# Patient Record
Sex: FEMALE | Race: WHITE | NOT HISPANIC OR LATINO | Employment: UNEMPLOYED | URBAN - METROPOLITAN AREA
[De-identification: names, ages, dates, MRNs, and addresses within clinical notes are randomized per-mention and may not be internally consistent; named-entity substitution may affect disease eponyms.]

---

## 2017-03-29 ENCOUNTER — HOSPITAL ENCOUNTER (OUTPATIENT)
Dept: RADIOLOGY | Facility: HOSPITAL | Age: 22
Discharge: HOME | End: 2017-03-29
Attending: FAMILY MEDICINE
Payer: MEDICAID

## 2017-03-29 ENCOUNTER — TELEPHONE (OUTPATIENT)
Dept: FAMILY MEDICINE | Facility: CLINIC | Age: 22
End: 2017-03-29

## 2017-03-29 ENCOUNTER — OFFICE VISIT (OUTPATIENT)
Dept: FAMILY MEDICINE | Facility: CLINIC | Age: 22
End: 2017-03-29
Payer: MEDICAID

## 2017-03-29 VITALS
OXYGEN SATURATION: 99 % | DIASTOLIC BLOOD PRESSURE: 82 MMHG | HEIGHT: 65 IN | TEMPERATURE: 99 F | SYSTOLIC BLOOD PRESSURE: 108 MMHG | HEART RATE: 75 BPM | BODY MASS INDEX: 31.89 KG/M2 | WEIGHT: 191.38 LBS

## 2017-03-29 DIAGNOSIS — H53.40 VISUAL FIELD LOSS: ICD-10-CM

## 2017-03-29 DIAGNOSIS — R53.83 FATIGUE, UNSPECIFIED TYPE: ICD-10-CM

## 2017-03-29 DIAGNOSIS — H53.40 VISUAL FIELD LOSS: Primary | ICD-10-CM

## 2017-03-29 DIAGNOSIS — K80.20 CALCULUS OF GALLBLADDER WITHOUT CHOLECYSTITIS WITHOUT OBSTRUCTION: ICD-10-CM

## 2017-03-29 PROCEDURE — 93880 EXTRACRANIAL BILAT STUDY: CPT | Mod: 26,,, | Performed by: RADIOLOGY

## 2017-03-29 PROCEDURE — 93880 EXTRACRANIAL BILAT STUDY: CPT | Mod: TC

## 2017-03-29 PROCEDURE — 70551 MRI BRAIN STEM W/O DYE: CPT | Mod: 26,,, | Performed by: RADIOLOGY

## 2017-03-29 PROCEDURE — 99214 OFFICE O/P EST MOD 30 MIN: CPT | Mod: PBBFAC,PO | Performed by: FAMILY MEDICINE

## 2017-03-29 PROCEDURE — 99214 OFFICE O/P EST MOD 30 MIN: CPT | Mod: S$PBB,,, | Performed by: FAMILY MEDICINE

## 2017-03-29 PROCEDURE — 99999 PR PBB SHADOW E&M-EST. PATIENT-LVL IV: CPT | Mod: PBBFAC,,, | Performed by: FAMILY MEDICINE

## 2017-03-29 RX ORDER — CETIRIZINE HYDROCHLORIDE 10 MG/1
TABLET ORAL
Refills: 0 | COMMUNITY
Start: 2017-02-02 | End: 2017-03-29

## 2017-03-29 RX ORDER — AZITHROMYCIN 250 MG/1
TABLET, FILM COATED ORAL
Refills: 0 | COMMUNITY
Start: 2017-02-02 | End: 2017-03-29 | Stop reason: ALTCHOICE

## 2017-03-29 RX ORDER — MONTELUKAST SODIUM 10 MG/1
TABLET ORAL
Refills: 0 | COMMUNITY
Start: 2017-02-02 | End: 2017-03-29

## 2017-03-29 NOTE — TELEPHONE ENCOUNTER
----- Message from Alice South sent at 3/29/2017  2:53 PM CDT -----  Contact: SELF  Patient just left her appt . She is trying to have ultrasound done at [facility] right now while she is having an MRI done .     [phone]    LL

## 2017-03-29 NOTE — PROGRESS NOTES
"[facility]  Progress Note    SUBJECTIVE:     Chief Complaint   Patient presents with    Fatigue    GI Problem       HPI   Michael Gonzalez  is a 21 y.o. female here for c/o fatigue for the past couple weeks. She doesn't know why she is feeling so tired lately. Admits sleeping close to 8 hours. She says had an episode where, during dinner, she kind of "froze, and her vision went out" even though she know her eyes were open and blinking. This lasted for few minutes and went away. She was nervous, but no chest pain, SOB. There was no confusion afterwards, and returned back to her normal self.     Review of patient's allergies indicates:   Allergen Reactions    Cortisone        Past Medical History:   Diagnosis Date    Allergy     Anemia     Gallstones      History reviewed. No pertinent surgical history.  Family History   Problem Relation Age of Onset    Diabetes Maternal Grandmother     Hypertension Maternal Grandmother     Stroke Maternal Grandmother     Hypertension Maternal Grandfather     Diabetes Maternal Grandfather      Social History   Substance Use Topics    Smoking status: Current Every Day Smoker     Types: Cigarettes    Smokeless tobacco: Never Used      Comment: socially    Alcohol use Yes      Comment: occassionally        Review of Systems   Constitutional: Negative for chills, fever and malaise/fatigue.   HENT: Negative.    Eyes: Negative for blurred vision, double vision, photophobia, pain, discharge and redness.        + transient visual field loss on both eyes   Respiratory: Negative.  Negative for cough and shortness of breath.    Cardiovascular: Negative.  Negative for chest pain.   Gastrointestinal: Negative.  Negative for abdominal pain, nausea and vomiting.   Genitourinary: Negative.    Neurological: Negative for weakness and headaches.   All other systems reviewed and are negative.    OBJECTIVE:     Vitals:    03/29/17 1234   BP: 108/82   Pulse: 75   Temp: 98.5 °F (36.9 " °C)     Body mass index is 31.84 kg/(m^2).    Physical Exam   Constitutional: She is oriented to person, place, and time and well-developed, well-nourished, and in no distress. No distress.   HENT:   Head: Normocephalic and atraumatic.   Eyes: Conjunctivae and EOM are normal.   Cardiovascular: Normal rate, regular rhythm and normal heart sounds.  Exam reveals no gallop and no friction rub.    No murmur heard.  Pulmonary/Chest: Effort normal and breath sounds normal. No respiratory distress. She has no wheezes. She has no rales.   Abdominal: Soft. Bowel sounds are normal. She exhibits no distension. There is no tenderness.   Neurological: She is alert and oriented to person, place, and time.   No neurological deficits. Motor 5/5 in both upper/lower extremities. CN 2-12 grossly intact   Skin: Skin is warm. She is not diaphoretic.       Old records were reviewed. Labs and/or images were independently reviewed.    ASSESSMENT     1. Visual field loss    2. Calculus of gallbladder without cholecystitis without obstruction    3. Fatigue, unspecified type        PLAN:     Visual field loss  -     MRI Brain Without Contrast; Future; Expected date: 3/29/17  -     US Carotid Bilateral; Future; Expected date: 3/29/17  -     Will order MRI to r/o intracranial abnormalities.     Calculus of gallbladder without cholecystitis without obstruction  -     Ambulatory referral to General Surgery for elective gallstone surgery.    Fatigue, unspecified type  -     CBC auto differential; Future  -     Comprehensive metabolic panel; Future  -     TSH; Future  -     T4, free; Future  -     Hemoglobin A1c; Future  -     Will r/o NEYDA as cause of fatigue.      RTC PRMIRNA Land MD  03/29/2017 1:33 PM

## 2017-03-29 NOTE — TELEPHONE ENCOUNTER
----- Message from Alice South sent at 3/29/2017  2:53 PM CDT -----  Contact: SELF  Patient just left her appt . She is trying to have ultrasound done at [facility] right now while she is having an MRI done .     [phone]    LL

## 2017-10-16 ENCOUNTER — OFFICE VISIT (OUTPATIENT)
Dept: FAMILY MEDICINE | Facility: CLINIC | Age: 22
End: 2017-10-16
Payer: MEDICAID

## 2017-10-16 VITALS
DIASTOLIC BLOOD PRESSURE: 72 MMHG | WEIGHT: 202.38 LBS | TEMPERATURE: 98 F | HEIGHT: 65 IN | BODY MASS INDEX: 33.72 KG/M2 | HEART RATE: 86 BPM | OXYGEN SATURATION: 98 % | SYSTOLIC BLOOD PRESSURE: 104 MMHG

## 2017-10-16 DIAGNOSIS — F41.9 ANXIETY: ICD-10-CM

## 2017-10-16 DIAGNOSIS — S29.012A STRAIN OF RHOMBOID MUSCLE, INITIAL ENCOUNTER: Primary | ICD-10-CM

## 2017-10-16 DIAGNOSIS — M25.511 ACUTE PAIN OF RIGHT SHOULDER: ICD-10-CM

## 2017-10-16 PROCEDURE — 99999 PR PBB SHADOW E&M-EST. PATIENT-LVL III: CPT | Mod: PBBFAC,,, | Performed by: FAMILY MEDICINE

## 2017-10-16 PROCEDURE — 99213 OFFICE O/P EST LOW 20 MIN: CPT | Mod: PBBFAC,PO | Performed by: FAMILY MEDICINE

## 2017-10-16 PROCEDURE — 99214 OFFICE O/P EST MOD 30 MIN: CPT | Mod: S$PBB,,, | Performed by: FAMILY MEDICINE

## 2017-10-16 RX ORDER — TIZANIDINE 4 MG/1
4 TABLET ORAL EVERY 8 HOURS
Qty: 30 TABLET | Refills: 0 | Status: SHIPPED | OUTPATIENT
Start: 2017-10-16 | End: 2017-10-26

## 2017-10-16 RX ORDER — NAPROXEN 500 MG/1
TABLET ORAL
COMMUNITY
Start: 2017-10-05 | End: 2017-10-16

## 2017-10-16 RX ORDER — IBUPROFEN 200 MG
200 TABLET ORAL EVERY 6 HOURS PRN
COMMUNITY
End: 2018-01-31

## 2017-10-16 RX ORDER — BUSPIRONE HYDROCHLORIDE 5 MG/1
5 TABLET ORAL 2 TIMES DAILY
Qty: 60 TABLET | Refills: 2 | Status: SHIPPED | OUTPATIENT
Start: 2017-10-16 | End: 2018-01-31

## 2017-10-16 RX ORDER — METHOCARBAMOL 500 MG/1
TABLET, FILM COATED ORAL
COMMUNITY
Start: 2017-10-05 | End: 2017-10-16

## 2017-10-16 NOTE — PROGRESS NOTES
[facility]  Progress Note    SUBJECTIVE:     Chief Complaint   Patient presents with    Shoulder Injury    Anxiety       HPI   Michael Gonzalez  is a 22 y.o. female here for c/o right shoulder injury when trying to clean a tree in the backyard, where it almost hit her. Rates pain as moderate, but is getting better with ibuprofen. Certain movements make it worst. Still has good range of motion. Also here for severe anxiety. She has stopped paxil as she is trying to actively get pregnant. The medication worked great but when she tried to wean herself off, the anxiety came back roaring.     Review of patient's allergies indicates:  No Known Allergies    Past Medical History:   Diagnosis Date    Allergy     Gallstones      History reviewed. No pertinent surgical history.  Family History   Problem Relation Age of Onset    Diabetes Maternal Grandmother     Hypertension Maternal Grandmother     Stroke Maternal Grandmother     Hypertension Maternal Grandfather     Diabetes Maternal Grandfather      Social History   Substance Use Topics    Smoking status: Current Every Day Smoker     Types: Cigarettes    Smokeless tobacco: Never Used      Comment: socially    Alcohol use Yes      Comment: occassionally        Review of Systems   Constitutional: Negative for chills, fever and malaise/fatigue.   HENT: Negative.    Respiratory: Negative.  Negative for cough and shortness of breath.    Cardiovascular: Negative.  Negative for chest pain.   Gastrointestinal: Negative.  Negative for abdominal pain, nausea and vomiting.   Genitourinary: Negative.    Musculoskeletal: Positive for back pain and joint pain (right shoulder). Negative for falls, myalgias and neck pain.   Neurological: Negative for weakness and headaches.   Psychiatric/Behavioral: Negative for depression. The patient is nervous/anxious.    All other systems reviewed and are negative.    OBJECTIVE:     Vitals:    10/16/17 0940   BP: 104/72   Pulse: 86    Temp: 98.4 °F (36.9 °C)     Body mass index is 33.68 kg/m².    Physical Exam   Constitutional: She is oriented to person, place, and time. She appears distressed (mild).   HENT:   Head: Normocephalic and atraumatic.   Eyes: Conjunctivae and EOM are normal.   Pulmonary/Chest: Effort normal.   Musculoskeletal: She exhibits tenderness (to palpation of right rhomboid muscle. good ROM of right shoulder, no point tenderness. good strength. ).   Neurological: She is alert and oriented to person, place, and time.   Skin: She is not diaphoretic.   Psychiatric: Her mood appears anxious. She does not exhibit a depressed mood. She expresses no homicidal and no suicidal ideation. She expresses no suicidal plans and no homicidal plans.       Old records were reviewed. Labs and/or images were independently reviewed.    ASSESSMENT     1. Strain of rhomboid muscle, initial encounter    2. Acute pain of right shoulder    3. Anxiety        PLAN:     Strain of rhomboid muscle, R shoulder pain  -     Start tizanidine (ZANAFLEX) 4 MG tablet; Take 1 tablet (4 mg total) by mouth every 8 (eight) hours.  Dispense: 30 tablet; Refill: 0  -     Patient counseled on good posture and stretching exercises. Continue to place ice packs on affected areas 3-4 times daily. Take medications as prescribed. Instructed patient to call MD if symptoms persist or worsen.    Anxiety  -     Start busPIRone (BUSPAR) 5 MG Tab; Take 1 tablet (5 mg total) by mouth 2 (two) times daily.  Dispense: 60 tablet; Refill: 2  -      Titrate up as needed. Patient trying to get pregnant. Weaned off paxil, start buspar.      RTC PRN    Christos Land MD  10/16/2017 10:09 AM

## 2018-01-31 ENCOUNTER — OFFICE VISIT (OUTPATIENT)
Dept: URGENT CARE | Facility: CLINIC | Age: 23
End: 2018-01-31
Payer: MEDICAID

## 2018-01-31 ENCOUNTER — TELEPHONE (OUTPATIENT)
Dept: FAMILY MEDICINE | Facility: CLINIC | Age: 23
End: 2018-01-31

## 2018-01-31 VITALS
BODY MASS INDEX: 30.82 KG/M2 | TEMPERATURE: 98 F | HEART RATE: 99 BPM | WEIGHT: 185 LBS | SYSTOLIC BLOOD PRESSURE: 126 MMHG | HEIGHT: 65 IN | DIASTOLIC BLOOD PRESSURE: 81 MMHG | RESPIRATION RATE: 16 BRPM | OXYGEN SATURATION: 99 %

## 2018-01-31 DIAGNOSIS — H11.32 SUBCONJUNCTIVAL HEMORRHAGE OF LEFT EYE: Primary | ICD-10-CM

## 2018-01-31 PROCEDURE — 3008F BODY MASS INDEX DOCD: CPT | Mod: S$GLB,,, | Performed by: PHYSICIAN ASSISTANT

## 2018-01-31 PROCEDURE — 99214 OFFICE O/P EST MOD 30 MIN: CPT | Mod: S$GLB,,, | Performed by: PHYSICIAN ASSISTANT

## 2018-01-31 RX ORDER — PNV,CALCIUM 72/IRON/FOLIC ACID 27 MG-1 MG
TABLET ORAL
Refills: 11 | COMMUNITY
Start: 2017-12-19 | End: 2018-01-31

## 2018-01-31 NOTE — PROGRESS NOTES
"Subjective:       Patient ID: Michael Gonzalez is a 22 y.o. female.    Vitals:  height is 5' 5" (1.651 m) and weight is 83.9 kg (185 lb). Her temperature is 98 °F (36.7 °C). Her blood pressure is 126/81 and her pulse is 99. Her respiration is 16 and oxygen saturation is 99%.     Chief Complaint: Eye Problem    Eye Problem    The left eye is affected. This is a new problem. The current episode started today. The problem occurs constantly. The problem has been gradually worsening. There was no injury mechanism. The pain is at a severity of 6/10. The pain is moderate. There is no known exposure to pink eye. She does not wear contacts. Associated symptoms include eye redness, a foreign body sensation and itching. Pertinent negatives include no blurred vision, eye discharge, double vision, fever, nausea, photophobia, recent URI or vomiting. She has tried eye drops for the symptoms. The treatment provided no relief.     Review of Systems   Constitution: Negative for chills and fever.   HENT: Negative for congestion.    Eyes: Positive for itching and redness. Negative for blurred vision, discharge, double vision, pain and photophobia.   Gastrointestinal: Negative for nausea and vomiting.   Neurological: Negative for headaches.   All other systems reviewed and are negative.      Objective:      Physical Exam   Constitutional: She is oriented to person, place, and time. Vital signs are normal. She appears well-developed and well-nourished. She does not appear ill. No distress.   HENT:   Head: Normocephalic and atraumatic.   Right Ear: External ear normal.   Left Ear: External ear normal.   Nose: Nose normal.   Eyes: EOM and lids are normal. Pupils are equal, round, and reactive to light. Lids are everted and swept, no foreign bodies found. Right eye exhibits no chemosis, no discharge, no exudate and no hordeolum. No foreign body present in the right eye. Left eye exhibits no chemosis, no discharge, no exudate and no " hordeolum. No foreign body present in the left eye. Right conjunctiva is not injected. Right conjunctiva has no hemorrhage. Left conjunctiva has a hemorrhage.       Subconjunctival hemorrhage visible on left eye; no chemosis; no discharge; no visual disturbances    Neck: Normal range of motion. Neck supple.   Cardiovascular: Normal rate, regular rhythm and normal heart sounds.  Exam reveals no gallop and no friction rub.    No murmur heard.  Pulmonary/Chest: Effort normal and breath sounds normal. No respiratory distress. She has no decreased breath sounds. She has no wheezes. She has no rhonchi. She has no rales.   Musculoskeletal: Normal range of motion.   Neurological: She is alert and oriented to person, place, and time.   Skin: Skin is warm and dry. No rash noted. No erythema.   Psychiatric: She has a normal mood and affect. Her behavior is normal.   Nursing note and vitals reviewed.      Assessment:       1. Subconjunctival hemorrhage of left eye        Plan:         Subconjunctival hemorrhage of left eye      Patient Instructions     Please follow up with your primary care provider within 2-5 days if your signs and symptoms have not resolved or worsen.     If your condition worsens or fails to improve we recommend that you receive another evaluation at the emergency room immediately or contact your primary medical clinic to discuss your concerns.   You must understand that you have received an Urgent Care treatment only and that you may be released before all of your medical problems are known or treated. You, the patient, will arrange for follow up care as instructed.         Subconjunctival Hemorrhage    A subconjunctival hemorrhage is when a blood vessel breaks open in the white of the eye. It causes a bright red patch in the white of the eye. It is similar to a bruise on the skin. This type of hemorrhage is common. It can look quite alarming, but it is usually harmless.  Understanding the conjunctiva  The  conjunctiva is the thin layer that covers the inside of the eyelids and the surface of the eye. It has many tiny blood vessels that bring oxygen and nutrients to the eye. The sclera is the white part of the eye that lies beneath the conjunctiva. Sometimes a blood vessel in the conjunctiva breaks open and bleeds. The blood then collects under the conjunctiva and turns part of the eye red. Over several weeks, your body then absorbs the blood.  What causes subconjunctival hemorrhage?  In many cases the cause isnt known. But some health conditions may make it more likely. These include:  · Eye injury  · Eye surgery  · High blood pressure  · Inflammation of the conjunctiva  · Contact lens use  · Diabetes  · Arteriosclerosis  · Tumor of the conjunctiva  · Diseases that affect blood clotting  · Violent sneezing, coughing, or vomiting  · Certain medicines that can increase bleeding, such as aspirin  · Pushing hard during childbirth  · Straining during constipation  Symptoms of subconjunctival hemorrhage  The main symptom is a red patch on the eye. You may notice it after waking up in the morning. In most cases just one eye will have a hemorrhage. It usually happens once and then goes away. But some health conditions may cause repeat hemorrhages. You may feel like you have something in your eye, but this is not common. The hemorrhage shouldnt affect your eyesight or cause any pain. If you do have pain, you may have another type of problem with your eye.  Diagnosing subconjunctival hemorrhage  Your healthcare provider will ask about your health history. You may have a physical exam. This includes a basic eye exam. Your provider will make sure you dont have other causes of red eye that may need other treatment.  You will need to see an eye doctor (ophthalmologist) if you have had an eye injury. This doctor might use a special lighted microscope to look closely at your eye. This helps show the doctor if the injury hurt the  eye itself and not just its outer layer.  If this is not your first subconjunctival hemorrhage, your doctor may need to find the cause. For example, you may need blood tests to check for a blood clotting disorder.  Treatment for subconjunctival hemorrhage  In most cases you will not need treatment. The red patch will usually go away on its own in a few weeks. It will turn from red to brown and then yellow. There are no treatments to speed up this process. Your doctor may suggest you use a warm compress and artificial tears eye drops to help relieve some of the redness.  If your subconjunctival hemorrhage was caused by a health condition, that condition will be treated. For example, you may need a blood pressure medicine to treat high blood pressure.  When to call your healthcare provider  Call your healthcare provider right away if you have any of these:  · Hemorrhage that doesnt go away in 2 to 3 weeks  · Eye pain  · Loss of eyesight  · Another subconjunctival hemorrhage    Date Last Reviewed: 2/1/2017  © 3560-9109 [employer] [address] [place] [address], [place] [address]. All rights reserved. This information is not intended as a substitute for professional medical care. Always follow your healthcare professional's instructions.

## 2018-01-31 NOTE — TELEPHONE ENCOUNTER
----- Message from Jim Pierson sent at 1/31/2018 12:52 PM CST -----  Contact: Self/[phone]  Patient called stating that it feels like she has something in her eye and she does not know if a vessel popped in her eye but she sees blood in her eye. Thank you.

## 2018-01-31 NOTE — PATIENT INSTRUCTIONS
Please follow up with your primary care provider within 2-5 days if your signs and symptoms have not resolved or worsen.     If your condition worsens or fails to improve we recommend that you receive another evaluation at the emergency room immediately or contact your primary medical clinic to discuss your concerns.   You must understand that you have received an Urgent Care treatment only and that you may be released before all of your medical problems are known or treated. You, the patient, will arrange for follow up care as instructed.         Subconjunctival Hemorrhage    A subconjunctival hemorrhage is when a blood vessel breaks open in the white of the eye. It causes a bright red patch in the white of the eye. It is similar to a bruise on the skin. This type of hemorrhage is common. It can look quite alarming, but it is usually harmless.  Understanding the conjunctiva  The conjunctiva is the thin layer that covers the inside of the eyelids and the surface of the eye. It has many tiny blood vessels that bring oxygen and nutrients to the eye. The sclera is the white part of the eye that lies beneath the conjunctiva. Sometimes a blood vessel in the conjunctiva breaks open and bleeds. The blood then collects under the conjunctiva and turns part of the eye red. Over several weeks, your body then absorbs the blood.  What causes subconjunctival hemorrhage?  In many cases the cause isnt known. But some health conditions may make it more likely. These include:  · Eye injury  · Eye surgery  · High blood pressure  · Inflammation of the conjunctiva  · Contact lens use  · Diabetes  · Arteriosclerosis  · Tumor of the conjunctiva  · Diseases that affect blood clotting  · Violent sneezing, coughing, or vomiting  · Certain medicines that can increase bleeding, such as aspirin  · Pushing hard during childbirth  · Straining during constipation  Symptoms of subconjunctival hemorrhage  The main symptom is a red patch on the eye.  You may notice it after waking up in the morning. In most cases just one eye will have a hemorrhage. It usually happens once and then goes away. But some health conditions may cause repeat hemorrhages. You may feel like you have something in your eye, but this is not common. The hemorrhage shouldnt affect your eyesight or cause any pain. If you do have pain, you may have another type of problem with your eye.  Diagnosing subconjunctival hemorrhage  Your healthcare provider will ask about your health history. You may have a physical exam. This includes a basic eye exam. Your provider will make sure you dont have other causes of red eye that may need other treatment.  You will need to see an eye doctor (ophthalmologist) if you have had an eye injury. This doctor might use a special lighted microscope to look closely at your eye. This helps show the doctor if the injury hurt the eye itself and not just its outer layer.  If this is not your first subconjunctival hemorrhage, your doctor may need to find the cause. For example, you may need blood tests to check for a blood clotting disorder.  Treatment for subconjunctival hemorrhage  In most cases you will not need treatment. The red patch will usually go away on its own in a few weeks. It will turn from red to brown and then yellow. There are no treatments to speed up this process. Your doctor may suggest you use a warm compress and artificial tears eye drops to help relieve some of the redness.  If your subconjunctival hemorrhage was caused by a health condition, that condition will be treated. For example, you may need a blood pressure medicine to treat high blood pressure.  When to call your healthcare provider  Call your healthcare provider right away if you have any of these:  · Hemorrhage that doesnt go away in 2 to 3 weeks  · Eye pain  · Loss of eyesight  · Another subconjunctival hemorrhage    Date Last Reviewed: 2/1/2017  © 8792-7290 [employer] [address] [place] [address]. All rights reserved. This information is not intended as a substitute for professional medical care. Always follow your healthcare professional's instructions.

## 2018-02-28 ENCOUNTER — OFFICE VISIT (OUTPATIENT)
Dept: URGENT CARE | Facility: CLINIC | Age: 23
End: 2018-02-28
Payer: MEDICAID

## 2018-02-28 VITALS
BODY MASS INDEX: 33.45 KG/M2 | TEMPERATURE: 99 F | OXYGEN SATURATION: 99 % | DIASTOLIC BLOOD PRESSURE: 74 MMHG | HEART RATE: 89 BPM | WEIGHT: 201 LBS | SYSTOLIC BLOOD PRESSURE: 125 MMHG

## 2018-02-28 DIAGNOSIS — J01.90 ACUTE NON-RECURRENT SINUSITIS, UNSPECIFIED LOCATION: Primary | ICD-10-CM

## 2018-02-28 PROCEDURE — 99213 OFFICE O/P EST LOW 20 MIN: CPT | Mod: S$GLB,,, | Performed by: NURSE PRACTITIONER

## 2018-02-28 RX ORDER — AMOXICILLIN AND CLAVULANATE POTASSIUM 875; 125 MG/1; MG/1
1 TABLET, FILM COATED ORAL 2 TIMES DAILY
Qty: 20 TABLET | Refills: 0 | Status: SHIPPED | OUTPATIENT
Start: 2018-02-28 | End: 2018-03-10

## 2018-02-28 RX ORDER — FLUTICASONE PROPIONATE 50 MCG
2 SPRAY, SUSPENSION (ML) NASAL DAILY
Qty: 1 BOTTLE | Refills: 0 | Status: SHIPPED | OUTPATIENT
Start: 2018-02-28 | End: 2018-03-10

## 2018-02-28 NOTE — PROGRESS NOTES
Subjective:       Patient ID: Michael Gonzalez is a 22 y.o. female.    Vitals:  weight is 91.2 kg (201 lb). Her temperature is 99.3 °F (37.4 °C). Her blood pressure is 125/74 and her pulse is 89. Her oxygen saturation is 99%.     Chief Complaint: URI    URI    This is a new problem. The current episode started 1 to 4 weeks ago. The problem has been gradually worsening. There has been no fever. Associated symptoms include congestion, coughing, ear pain, headaches, sinus pain and a sore throat. Pertinent negatives include no abdominal pain, chest pain, nausea or wheezing. She has tried acetaminophen for the symptoms.     Review of Systems   Constitution: Negative for chills, fever and malaise/fatigue.   HENT: Positive for congestion, ear pain, sinus pain and sore throat. Negative for hoarse voice.    Eyes: Negative for discharge and redness.   Cardiovascular: Negative for chest pain, dyspnea on exertion and leg swelling.   Respiratory: Positive for cough. Negative for shortness of breath, sputum production and wheezing.    Musculoskeletal: Negative for myalgias.   Gastrointestinal: Negative for abdominal pain and nausea.   Neurological: Positive for headaches.       Objective:      Physical Exam   Constitutional: She is oriented to person, place, and time. Vital signs are normal. She appears well-developed and well-nourished. She is cooperative.  Non-toxic appearance. She does not have a sickly appearance. She does not appear ill. No distress.   HENT:   Head: Normocephalic and atraumatic.   Right Ear: Hearing, tympanic membrane, external ear and ear canal normal.   Left Ear: Hearing, tympanic membrane, external ear and ear canal normal.   Nose: Mucosal edema and rhinorrhea present. Right sinus exhibits maxillary sinus tenderness and frontal sinus tenderness. Left sinus exhibits maxillary sinus tenderness and frontal sinus tenderness.   Mouth/Throat: Uvula is midline and mucous membranes are normal. Posterior  oropharyngeal edema and posterior oropharyngeal erythema present.   Eyes: Conjunctivae and lids are normal.   Neck: Normal range of motion and full passive range of motion without pain. Neck supple. No neck rigidity. No edema, no erythema and normal range of motion present.   Cardiovascular: Normal rate, regular rhythm and normal heart sounds.    Pulmonary/Chest: Effort normal and breath sounds normal. No accessory muscle usage. No apnea, no tachypnea and no bradypnea. No respiratory distress. She has no decreased breath sounds. She has no wheezes. She has no rhonchi. She has no rales.   Abdominal: Normal appearance.   Lymphadenopathy:        Head (right side): No submental, no submandibular, no tonsillar, no preauricular, no posterior auricular and no occipital adenopathy present.        Head (left side): No submental, no submandibular, no tonsillar, no preauricular, no posterior auricular and no occipital adenopathy present.     She has no cervical adenopathy.   Neurological: She is alert and oriented to person, place, and time.   Psychiatric: She has a normal mood and affect. Her speech is normal and behavior is normal.   Nursing note and vitals reviewed.      Assessment:       1. Acute non-recurrent sinusitis, unspecified location        Plan:         Acute non-recurrent sinusitis, unspecified location  -     POCT Influenza A/B  -     amoxicillin-clavulanate 875-125mg (AUGMENTIN) 875-125 mg per tablet; Take 1 tablet by mouth 2 (two) times daily.  Dispense: 20 tablet; Refill: 0  -     fluticasone (FLONASE) 50 mcg/actuation nasal spray; 2 sprays (100 mcg total) by Each Nare route once daily.  Dispense: 1 Bottle; Refill: 0      Discussed  Negative results of flu test with pt and symptom therapy for fevers and congestion with tylenol, ibuprofen, and mucinex as directed.

## 2018-02-28 NOTE — PATIENT INSTRUCTIONS
Please follow up with your primary care provider if you are not feeling better in 7-10 days.    Please drink plenty of fluids.  Please get plenty of rest.    Please return here or go to the Emergency Department for any concerns or worsening of condition.    If you were prescribed antibiotics, please take them to completion.    If not allergic, please take over the counter Tylenol (Acetaminophen) as directed on bottle for control of pain and/or fever.    Please follow up with your primary care doctor or specialist as needed.    If you  smoke, please stop smoking.    Acute Bacterial Rhinosinusitis (ABRS)    Acute bacterial rhinosinusitis (ABRS) is an infection of your nasal cavity and sinuses. Its caused by bacteria. Acute means that youve had symptoms for less than 12 weeks.  Understanding your sinuses  The nasal cavity is the large air-filled space behind your nose. The sinuses are a group of spaces formed by the bones of your face. They connect with your nasal cavity. ABRS causes the tissue lining these spaces to become inflamed. Mucus may not drain normally. This leads to facial pain and other symptoms.  What causes ABRS?  ABRS most often follows an upper respiratory infection caused by a virus. Bacteria then infect the lining of your nasal cavity and sinuses. But you can also get ABRS if you have:  · Nasal allergies  · Long-term nasal swelling and congestion not caused by allergies  · Blockage in the nose  Symptoms of ABRS  The symptoms of ABRS may be different for each person, and can include:  · Nasal congestion  · Runny nose  · Fluid draining from the nose down the throat (postnasal drip)  · Headache  · Cough  · Pain in the sinuses  · Thick, colored fluid from the nose (mucus)  · Fever  Diagnosing ABRS  ABRS may be diagnosed if youve had an upper respiratory infection like a cold and cough for longer than 10 to 14 days. Your health care provider will ask about your symptoms and your medical history. The  provider will check your vital signs, including your temperature. Youll have a physical exam. The health care provider will check your ears, nose, and throat. You likely wont need any tests. If ABRS comes back, you may have a culture or other tests.  Treatment for ABRS  Treatment may include:  · Antibiotic medicine. This is for symptoms that last for at least 10 to 14 days.  · Nasal corticosteroid medicine. Drops or spray used in the nose can lessen swelling and congestion.  · Over-the-counter pain medicine. This is to lessen sinus pain and pressure.  · Nasal decongestant medicine. Spray or drops may help to lessen congestion. Do not use them for more than a few days.  · Salt wash (saline irrigation). This can help to loosen mucus.  Possible complications of ABRS  ABRS may come back or become long-term (chronic).  In rare cases, ABRS may cause complications such as:   · Inflamed tissue around the brain and spinal cord (meningitis)  · Inflamed tissue around the eyes (orbital cellulitis)  · Inflamed bones around the sinuses (osteitis)  These problems may need to be treated in a hospital with intravenous (IV) antibiotic medicine or surgery.  When to call the health care provider  Call your health care provider if you have any of the following:  · Symptoms that dont get better, or get worse  · Symptoms that dont get better after 3 to 5 days on antibiotics  · Trouble seeing  · Swelling around your eyes  · Confusion or trouble staying awake   Date Last Reviewed: 3/3/2015  © 0367-1821 [employer]. [address] [place] [address], [place] [address]. All rights reserved. This information is not intended as a substitute for professional medical care. Always follow your healthcare professional's instructions.

## 2018-03-23 DIAGNOSIS — J01.90 ACUTE NON-RECURRENT SINUSITIS, UNSPECIFIED LOCATION: ICD-10-CM

## 2018-03-26 RX ORDER — FLUTICASONE PROPIONATE 50 MCG
SPRAY, SUSPENSION (ML) NASAL
Refills: 0 | OUTPATIENT
Start: 2018-03-26

## 2018-05-29 PROBLEM — O41.8X20 SUBCHORIONIC HEMATOMA IN SECOND TRIMESTER: Status: ACTIVE | Noted: 2018-05-29

## 2018-05-29 PROBLEM — O46.8X2 SUBCHORIONIC HEMATOMA IN SECOND TRIMESTER: Status: ACTIVE | Noted: 2018-05-29

## 2018-06-19 PROBLEM — Z67.91 RH NEGATIVE STATE IN ANTEPARTUM PERIOD: Status: ACTIVE | Noted: 2018-06-19

## 2018-06-19 PROBLEM — O26.899 RH NEGATIVE STATE IN ANTEPARTUM PERIOD: Status: ACTIVE | Noted: 2018-06-19

## 2018-09-06 PROBLEM — O46.8X2 SUBCHORIONIC HEMATOMA IN SECOND TRIMESTER: Status: RESOLVED | Noted: 2018-05-29 | Resolved: 2018-09-06

## 2018-09-06 PROBLEM — O41.8X20 SUBCHORIONIC HEMATOMA IN SECOND TRIMESTER: Status: RESOLVED | Noted: 2018-05-29 | Resolved: 2018-09-06

## 2018-09-25 PROBLEM — O99.820 GBS (GROUP B STREPTOCOCCUS CARRIER), +RV CULTURE, CURRENTLY PREGNANT: Status: ACTIVE | Noted: 2018-09-25

## 2018-10-29 PROBLEM — O99.820 GBS (GROUP B STREPTOCOCCUS CARRIER), +RV CULTURE, CURRENTLY PREGNANT: Status: RESOLVED | Noted: 2018-09-25 | Resolved: 2018-10-29

## 2018-11-01 ENCOUNTER — HOSPITAL ENCOUNTER (OUTPATIENT)
Dept: RADIOLOGY | Facility: HOSPITAL | Age: 23
Discharge: HOME | End: 2018-11-01
Attending: OBSTETRICS & GYNECOLOGY
Payer: MEDICAID

## 2018-11-01 PROCEDURE — 93970 EXTREMITY STUDY: CPT | Mod: 26,,, | Performed by: RADIOLOGY

## 2018-11-01 PROCEDURE — 93970 EXTREMITY STUDY: CPT | Mod: TC

## 2018-11-19 ENCOUNTER — OFFICE VISIT (OUTPATIENT)
Dept: URGENT CARE | Facility: CLINIC | Age: 23
End: 2018-11-19
Payer: MEDICAID

## 2018-11-19 VITALS
TEMPERATURE: 99 F | WEIGHT: 200 LBS | RESPIRATION RATE: 18 BRPM | HEIGHT: 65 IN | BODY MASS INDEX: 33.32 KG/M2 | SYSTOLIC BLOOD PRESSURE: 120 MMHG | DIASTOLIC BLOOD PRESSURE: 76 MMHG | HEART RATE: 78 BPM | OXYGEN SATURATION: 98 %

## 2018-11-19 DIAGNOSIS — J06.9 VIRAL URI: Primary | ICD-10-CM

## 2018-11-19 LAB
CTP QC/QA: YES
CTP QC/QA: YES
FLUAV AG NPH QL: NEGATIVE
FLUBV AG NPH QL: NEGATIVE
S PYO RRNA THROAT QL PROBE: NEGATIVE

## 2018-11-19 PROCEDURE — 99214 OFFICE O/P EST MOD 30 MIN: CPT | Mod: S$GLB,,, | Performed by: NURSE PRACTITIONER

## 2018-11-19 PROCEDURE — 87880 STREP A ASSAY W/OPTIC: CPT | Mod: QW,S$GLB,, | Performed by: NURSE PRACTITIONER

## 2018-11-19 PROCEDURE — 87804 INFLUENZA ASSAY W/OPTIC: CPT | Mod: QW,S$GLB,, | Performed by: NURSE PRACTITIONER

## 2018-11-19 NOTE — PATIENT INSTRUCTIONS
Viral Upper Respiratory Illness (Adult)  You have a viral upper respiratory illness (URI), which is another term for the common cold. This illness is contagious during the first few days. It is spread through the air by coughing and sneezing. It may also be spread by direct contact (touching the sick person and then touching your own eyes, nose, or mouth). Frequent handwashing will decrease risk of spread. Most viral illnesses go away within 7 to 10 days with rest and simple home remedies. Sometimes the illness may last for several weeks. Antibiotics will not kill a virus, and they are generally not prescribed for this condition.    Home care  · If symptoms are severe, rest at home for the first 2 to 3 days. When you resume activity, don't let yourself get too tired.  · Avoid being exposed to cigarette smoke (yours or others).  · You may use acetaminophen or ibuprofen to control pain and fever, unless another medicine was prescribed. (Note: If you have chronic liver or kidney disease, have ever had a stomach ulcer or gastrointestinal bleeding, or are taking blood-thinning medicines, talk with your healthcare provider before using these medicines.) Aspirin should never be given to anyone under 18 years of age who is ill with a viral infection or fever. It may cause severe liver or brain damage.  · Your appetite may be poor, so a light diet is fine. Avoid dehydration by drinking 6 to 8 glasses of fluids per day (water, soft drinks, juices, tea, or soup). Extra fluids will help loosen secretions in the nose and lungs.  · Over-the-counter cold medicines will not shorten the length of time youre sick, but they may be helpful for the following symptoms: cough, sore throat, and nasal and sinus congestion. (Note: Do not use decongestants if you have high blood pressure.)  Follow-up care  Follow up with your healthcare provider, or as advised.  When to seek medical advice  Call your healthcare provider right away if any  of these occur:  · Cough with lots of colored sputum (mucus)  · Severe headache; face, neck, or ear pain  · Difficulty swallowing due to throat pain  · Fever of 100.4°F (38°C)  Call 911, or get immediate medical care  Call emergency services right away if any of these occur:  · Chest pain, shortness of breath, wheezing, or difficulty breathing  · Coughing up blood  · Inability to swallow due to throat pain  Date Last Reviewed: 9/13/2015  © 6949-4018 [employer]. [address] [place] [address]. All rights reserved. This information is not intended as a substitute for professional medical care. Always follow your healthcare professional's instructions.

## 2018-11-19 NOTE — PROGRESS NOTES
"Subjective:       Patient ID: Michael Gonzalez is a 23 y.o. female.    Vitals:  height is 5' 5" (1.651 m) and weight is 90.7 kg (200 lb). Her temperature is 98.6 °F (37 °C). Her blood pressure is 120/76 and her pulse is 78. Her respiration is 18 and oxygen saturation is 98%.     Chief Complaint: URI    Pt is breastfeeding.       URI    This is a new problem. The current episode started yesterday. The problem has been rapidly worsening. The maximum temperature recorded prior to her arrival was 101 - 101.9 F. Associated symptoms include congestion, coughing, headaches and a sore throat. Pertinent negatives include no ear pain, nausea, rash, sinus pain, vomiting or wheezing. She has tried NSAIDs for the symptoms. The treatment provided mild relief.       Constitution: Positive for fever. Negative for chills, sweating and fatigue.   HENT: Positive for congestion, sinus pressure and sore throat. Negative for ear pain, sinus pain and voice change.    Neck: Negative for painful lymph nodes.   Eyes: Negative for eye redness.   Respiratory: Positive for cough. Negative for chest tightness, sputum production, bloody sputum, COPD, shortness of breath, stridor, wheezing and asthma.    Gastrointestinal: Negative for nausea and vomiting.   Musculoskeletal: Negative for muscle ache.   Skin: Negative for rash.   Allergic/Immunologic: Negative for seasonal allergies and asthma.   Neurological: Positive for headaches.   Hematologic/Lymphatic: Negative for swollen lymph nodes.       Objective:      Physical Exam   Constitutional: She is oriented to person, place, and time. Vital signs are normal. She appears well-developed and well-nourished. She is cooperative.  Non-toxic appearance. She does not appear ill. No distress.   HENT:   Head: Normocephalic and atraumatic.   Right Ear: Hearing, tympanic membrane, external ear and ear canal normal.   Left Ear: Hearing, tympanic membrane, external ear and ear canal normal.   Nose: Mucosal " edema and rhinorrhea present. No nasal deformity. No epistaxis. Right sinus exhibits no maxillary sinus tenderness and no frontal sinus tenderness. Left sinus exhibits no maxillary sinus tenderness and no frontal sinus tenderness.   Mouth/Throat: Uvula is midline, oropharynx is clear and moist and mucous membranes are normal. No trismus in the jaw. Normal dentition. No uvula swelling. No oropharyngeal exudate, posterior oropharyngeal edema or posterior oropharyngeal erythema.   Eyes: Conjunctivae and lids are normal. No scleral icterus.   Sclera clear bilat   Neck: Trachea normal, full passive range of motion without pain and phonation normal. Neck supple.   Cardiovascular: Normal rate, regular rhythm, normal heart sounds, intact distal pulses and normal pulses.   Pulmonary/Chest: Effort normal and breath sounds normal. No stridor. No respiratory distress. She has no decreased breath sounds. She has no wheezes.   Abdominal: Soft. Normal appearance and bowel sounds are normal. She exhibits no distension. There is no tenderness.   Musculoskeletal: Normal range of motion. She exhibits no edema or deformity.   Lymphadenopathy:     She has no cervical adenopathy.   Neurological: She is alert and oriented to person, place, and time. She exhibits normal muscle tone. Coordination normal.   Skin: Skin is warm, dry and intact. She is not diaphoretic. No pallor.   Psychiatric: She has a normal mood and affect. Her speech is normal and behavior is normal. Judgment and thought content normal. Cognition and memory are normal.   Nursing note and vitals reviewed.      Results for orders placed or performed in visit on 11/19/18   POCT Influenza A/B   Result Value Ref Range    Rapid Influenza A Ag Negative Negative    Rapid Influenza B Ag Negative Negative     Acceptable Yes    POCT rapid strep A   Result Value Ref Range    Rapid Strep A Screen Negative Negative     Acceptable Yes       Assessment:        1. Viral URI        Plan:         Viral URI  -     POCT Influenza A/B  -     POCT rapid strep A      Patient Instructions     Viral Upper Respiratory Illness (Adult)  You have a viral upper respiratory illness (URI), which is another term for the common cold. This illness is contagious during the first few days. It is spread through the air by coughing and sneezing. It may also be spread by direct contact (touching the sick person and then touching your own eyes, nose, or mouth). Frequent handwashing will decrease risk of spread. Most viral illnesses go away within 7 to 10 days with rest and simple home remedies. Sometimes the illness may last for several weeks. Antibiotics will not kill a virus, and they are generally not prescribed for this condition.    Home care  · If symptoms are severe, rest at home for the first 2 to 3 days. When you resume activity, don't let yourself get too tired.  · Avoid being exposed to cigarette smoke (yours or others).  · You may use acetaminophen or ibuprofen to control pain and fever, unless another medicine was prescribed. (Note: If you have chronic liver or kidney disease, have ever had a stomach ulcer or gastrointestinal bleeding, or are taking blood-thinning medicines, talk with your healthcare provider before using these medicines.) Aspirin should never be given to anyone under 18 years of age who is ill with a viral infection or fever. It may cause severe liver or brain damage.  · Your appetite may be poor, so a light diet is fine. Avoid dehydration by drinking 6 to 8 glasses of fluids per day (water, soft drinks, juices, tea, or soup). Extra fluids will help loosen secretions in the nose and lungs.  · Over-the-counter cold medicines will not shorten the length of time youre sick, but they may be helpful for the following symptoms: cough, sore throat, and nasal and sinus congestion. (Note: Do not use decongestants if you have high blood pressure.)  Follow-up care  Follow  up with your healthcare provider, or as advised.  When to seek medical advice  Call your healthcare provider right away if any of these occur:  · Cough with lots of colored sputum (mucus)  · Severe headache; face, neck, or ear pain  · Difficulty swallowing due to throat pain  · Fever of 100.4°F (38°C)  Call 911, or get immediate medical care  Call emergency services right away if any of these occur:  · Chest pain, shortness of breath, wheezing, or difficulty breathing  · Coughing up blood  · Inability to swallow due to throat pain  Date Last Reviewed: 9/13/2015  © 3582-4092 [employer]. [address] [place] [address], [place] [address]. All rights reserved. This information is not intended as a substitute for professional medical care. Always follow your healthcare professional's instructions.

## 2018-11-26 ENCOUNTER — TELEPHONE (OUTPATIENT)
Dept: FAMILY MEDICINE | Facility: CLINIC | Age: 23
End: 2018-11-26

## 2018-11-26 DIAGNOSIS — F32.A DEPRESSION, UNSPECIFIED DEPRESSION TYPE: Primary | ICD-10-CM

## 2018-11-26 DIAGNOSIS — F41.9 ANXIETY: ICD-10-CM

## 2018-11-26 RX ORDER — IBUPROFEN 600 MG/1
TABLET ORAL
Refills: 0 | COMMUNITY
Start: 2018-10-26 | End: 2021-01-26

## 2018-11-26 RX ORDER — OXYCODONE AND ACETAMINOPHEN 5; 325 MG/1; MG/1
TABLET ORAL
Refills: 0 | COMMUNITY
Start: 2018-10-26 | End: 2019-08-30 | Stop reason: ALTCHOICE

## 2018-11-26 RX ORDER — PAROXETINE HYDROCHLORIDE 20 MG/1
20 TABLET, FILM COATED ORAL EVERY MORNING
Qty: 30 TABLET | Refills: 11 | OUTPATIENT
Start: 2018-11-26 | End: 2019-11-26

## 2018-11-26 RX ORDER — AMOXICILLIN 875 MG/1
TABLET, FILM COATED ORAL
Refills: 0 | COMMUNITY
Start: 2018-11-20 | End: 2019-08-30 | Stop reason: ALTCHOICE

## 2018-11-26 RX ORDER — PAROXETINE HYDROCHLORIDE 20 MG/1
20 TABLET, FILM COATED ORAL EVERY MORNING
Qty: 30 TABLET | Refills: 11 | Status: SHIPPED | OUTPATIENT
Start: 2018-11-26 | End: 2021-01-26

## 2018-11-26 RX ORDER — PNV,CALCIUM 72/IRON/FOLIC ACID 27 MG-1 MG
TABLET ORAL
Refills: 11 | COMMUNITY
Start: 2018-11-20 | End: 2019-08-30

## 2018-11-26 NOTE — TELEPHONE ENCOUNTER
Patient states she wanted to double check to see if you knew that she was taking paroxetine 20mg ?? And she also needs refill on it if its ok to take well breastfeeding .

## 2018-11-26 NOTE — TELEPHONE ENCOUNTER
----- Message from Renee Morrison sent at 11/26/2018  8:09 AM CST -----  Contact: self [phone]  Pt would like to know if okay to start taking her anxiety medication while she's breastfeeding

## 2018-11-26 NOTE — TELEPHONE ENCOUNTER
Spoke with patient and informed her that provider said that it was ok to be on her Paxil. Refill request placed.

## 2019-04-16 ENCOUNTER — HOSPITAL ENCOUNTER (EMERGENCY)
Facility: HOSPITAL | Age: 24
Discharge: HOME | End: 2019-04-16
Attending: INTERNAL MEDICINE
Payer: MEDICAID

## 2019-04-16 VITALS
WEIGHT: 200 LBS | TEMPERATURE: 98 F | DIASTOLIC BLOOD PRESSURE: 84 MMHG | HEIGHT: 65 IN | RESPIRATION RATE: 20 BRPM | HEART RATE: 76 BPM | OXYGEN SATURATION: 100 % | SYSTOLIC BLOOD PRESSURE: 112 MMHG | BODY MASS INDEX: 33.32 KG/M2

## 2019-04-16 DIAGNOSIS — R07.89 CHEST DISCOMFORT: ICD-10-CM

## 2019-04-16 DIAGNOSIS — F41.9 ANXIETY: Primary | ICD-10-CM

## 2019-04-16 LAB
ALBUMIN SERPL-MCNC: 4.3 G/DL
ALP SERPL-CCNC: 73 U/L
B-HCG UR QL: NEGATIVE
BILIRUB SERPL-MCNC: 0.6 MG/DL
BUN SERPL-MCNC: 13 MG/DL
CALCIUM SERPL-MCNC: 10.1 MG/DL
CHLORIDE SERPL-SCNC: 103 MMOL/L
CREAT SERPL-MCNC: 0.7 MG/DL
CTP QC/QA: YES
GLUCOSE SERPL-MCNC: 83 MG/DL (ref 70–110)
POC ALT (SGPT): 20 U/L
POC AST (SGOT): 23 U/L
POC CARDIAC TROPONIN I: 0 NG/ML
POC D-DI: <100 NG/ML (ref 0–450)
POC TCO2: 25 MMOL/L
POTASSIUM BLD-SCNC: 3.5 MMOL/L
PROTEIN, POC: 7.9 G/DL
SAMPLE: NORMAL
SODIUM BLD-SCNC: 146 MMOL/L

## 2019-04-16 PROCEDURE — 93005 ELECTROCARDIOGRAM TRACING: CPT | Mod: ER

## 2019-04-16 PROCEDURE — 82553 CREATINE MB FRACTION: CPT | Mod: ER

## 2019-04-16 PROCEDURE — 93010 ELECTROCARDIOGRAM REPORT: CPT | Mod: ,,, | Performed by: INTERNAL MEDICINE

## 2019-04-16 PROCEDURE — 85025 COMPLETE CBC W/AUTO DIFF WBC: CPT | Mod: ER

## 2019-04-16 PROCEDURE — 81025 URINE PREGNANCY TEST: CPT | Mod: ER | Performed by: INTERNAL MEDICINE

## 2019-04-16 PROCEDURE — 85379 FIBRIN DEGRADATION QUANT: CPT | Mod: ER

## 2019-04-16 PROCEDURE — 84484 ASSAY OF TROPONIN QUANT: CPT | Mod: ER

## 2019-04-16 PROCEDURE — 99284 EMERGENCY DEPT VISIT MOD MDM: CPT | Mod: 25,ER

## 2019-04-16 PROCEDURE — 93010 EKG 12-LEAD: ICD-10-PCS | Mod: ,,, | Performed by: INTERNAL MEDICINE

## 2019-04-17 NOTE — ED PROVIDER NOTES
"Encounter Date: 4/16/2019    SCRIBE #1 NOTE: I, Kathi Porter, am scribing for, and in the presence of,  Dr. Moreno. I have scribed the following portions of the note - Other sections scribed: HPI, ROS, PE.       History     Chief Complaint   Patient presents with    Shortness of Breath     started this morning and began feeling numbness and tingling in all four extremeties around noon and then in face just prior to arrival     Michael Gonzalez is a 23 y.o. female who presents to the ED complaining of SOB, onset this morning when she woke up feeling like she had a "ton of bricks" on her chest. She reports feeling like she couldn't catch her breath, tingling in her face and extremities, and intermittent lightheadedness without syncope. Pt does not feel lightheaded during exam. Denies CP, fever, chills, n/v. Family Hx of DM, but no known family cardiac history.  Pt stopped taking anxiety medication during pregnancy and found that she was fine without it, so she did not resume medication after pregnancy. She was re-prescribed Paxil a few weeks ago and started taking it today, approx. 1 hr PTA.        Review of patient's allergies indicates:  No Known Allergies  Past Medical History:   Diagnosis Date    Allergy     Anxiety     Gallstones      History reviewed. No pertinent surgical history.  Family History   Problem Relation Age of Onset    No Known Problems Mother     Diabetes Father     Hypertension Father      Social History     Tobacco Use    Smoking status: Former Smoker     Types: Cigarettes    Smokeless tobacco: Never Used    Tobacco comment: socially   Substance Use Topics    Alcohol use: No     Frequency: Never    Drug use: No     Review of Systems   Constitutional: Negative for chills and fever.   HENT: Negative for sore throat.    Respiratory: Positive for shortness of breath.    Cardiovascular: Negative for chest pain.   Gastrointestinal: Negative for nausea and vomiting.   Genitourinary: Negative for " "dysuria.   Musculoskeletal: Negative for back pain.   Skin: Negative for rash.   Neurological: Positive for light-headedness and numbness ("tingling"). Negative for syncope and weakness.   Hematological: Negative for adenopathy.   Psychiatric/Behavioral: Negative for behavioral problems.   All other systems reviewed and are negative.      Physical Exam     Initial Vitals [04/16/19 1933]   BP Pulse Resp Temp SpO2   (!) 142/89 87 20 97.7 °F (36.5 °C) 100 %      MAP       --         Physical Exam    Nursing note and vitals reviewed.  Constitutional: She appears well-developed and well-nourished.   HENT:   Head: Normocephalic and atraumatic.   Right Ear: External ear normal.   Left Ear: External ear normal.   Nose: Nose normal.   Eyes: Conjunctivae are normal.   Neck: Normal range of motion. Neck supple.   Cardiovascular: Normal rate and intact distal pulses.   Pulmonary/Chest: Effort normal. No respiratory distress.   Abdominal: Soft. There is no tenderness.   Musculoskeletal: Normal range of motion.   Neurological: She is alert and oriented to person, place, and time.   Skin: Skin is warm and dry. Capillary refill takes less than 2 seconds.   Psychiatric: Her behavior is normal. Her mood appears anxious.         ED Course   Procedures  Labs Reviewed   POCT D DIMER - Abnormal; Notable for the following components:       Result Value    POC D-DI <100 (*)     All other components within normal limits   TROPONIN ISTAT   POCT URINE PREGNANCY   POCT CBC   POCT CMP   POCT D DIMER   POCT TROPONIN   POCT CMP     EKG Readings: (Independently Interpreted)   Initial Reading: No STEMI. Rhythm: Normal Sinus Rhythm. Heart Rate: 74. Ectopy: No Ectopy. Conduction: Normal. ST Segments: Normal ST Segments. T Waves: Normal. Clinical Impression: Normal Sinus Rhythm       Imaging Results    None          Medical Decision Making:   History:   Old Medical Records: I decided to obtain old medical records.  Initial Assessment:   Michael CHADWICK" "Carlos is a 23 y.o. female who presents to the ED complaining of SOB, onset this morning when she woke up feeling like she had a "ton of bricks" on her chest. She reports feeling like she couldn't catch her breath, tingling in her face and extremities, and intermittent lightheadedness without syncope. Pt does not feel lightheaded during exam. Denies CP, fever, chills, n/v. Family Hx of DM, but no known family cardiac history.  Pt stopped taking anxiety medication during pregnancy and found that she was fine without it, so she did not resume medication after pregnancy. She was re-prescribed Paxil a few weeks ago and started taking it today, approx. 1 hr PTA.  Differential Diagnosis:   Anxiety  Pulmonary embolus  Pneumonia  Myocardial infarction  Pleurisy  Musculoskeletal chest pain  GERD    Independently Interpreted Test(s):   I have ordered and independently interpreted EKG Reading(s) - see prior notes  Clinical Tests:   Lab Tests: Ordered and Reviewed  Medical Tests: Ordered and Reviewed  ED Management:  CBC, CMP, troponin and D-dimer were within normal limits.  Patient was given instructions for anxiety and advised to continue her paroxetine and follow-up with her primary care physician tomorrow for re-evaluation/return to the emergency department if condition worsens.            Scribe Attestation:   Scribe #1: I performed the above scribed service and the documentation accurately describes the services I performed. I attest to the accuracy of the note.    This document was produced by a scribe under my direction and in my presence. I agree with the content of the note and have made any necessary edits.     Dr. Moreno    04/16/2019 10:26 PM             Clinical Impression:     1. Anxiety    2. Chest discomfort            Disposition:   Disposition: Discharged  Condition: Stable                        Jean-Claude Moreno MD  04/16/19 2227       Jean-Claude Moreno MD  04/17/19 0704    "

## 2019-04-17 NOTE — ED TRIAGE NOTES
Patient arrived from home via pov with c/o waking up short of breath and then began feeling numbness in feet and hands around noon. Pt states she began feeling numbness and tingling in face just prior to arrival. Pt denies any N/V/D and states she has a history of anxiety and restarted her meds today

## 2019-08-16 ENCOUNTER — PATIENT OUTREACH (OUTPATIENT)
Dept: ADMINISTRATIVE | Facility: HOSPITAL | Age: 24
End: 2019-08-16

## 2019-08-16 DIAGNOSIS — Z13.220 SCREENING FOR HYPERLIPIDEMIA: Primary | ICD-10-CM

## 2019-08-30 ENCOUNTER — OFFICE VISIT (OUTPATIENT)
Dept: FAMILY MEDICINE | Facility: CLINIC | Age: 24
End: 2019-08-30
Payer: MEDICAID

## 2019-08-30 ENCOUNTER — LAB VISIT (OUTPATIENT)
Dept: LAB | Facility: HOSPITAL | Age: 24
End: 2019-08-30
Attending: FAMILY MEDICINE
Payer: MEDICAID

## 2019-08-30 VITALS
SYSTOLIC BLOOD PRESSURE: 118 MMHG | DIASTOLIC BLOOD PRESSURE: 74 MMHG | WEIGHT: 212.63 LBS | BODY MASS INDEX: 35.43 KG/M2 | OXYGEN SATURATION: 98 % | HEIGHT: 65 IN | HEART RATE: 84 BPM | TEMPERATURE: 99 F

## 2019-08-30 DIAGNOSIS — G43.809 OTHER MIGRAINE WITHOUT STATUS MIGRAINOSUS, NOT INTRACTABLE: ICD-10-CM

## 2019-08-30 DIAGNOSIS — L98.9 SKIN LESION: Primary | ICD-10-CM

## 2019-08-30 PROBLEM — Z67.91 RH NEGATIVE STATE IN ANTEPARTUM PERIOD: Status: RESOLVED | Noted: 2018-06-19 | Resolved: 2019-08-30

## 2019-08-30 PROBLEM — R07.89 CHEST DISCOMFORT: Status: RESOLVED | Noted: 2019-04-16 | Resolved: 2019-08-30

## 2019-08-30 PROBLEM — O26.899 RH NEGATIVE STATE IN ANTEPARTUM PERIOD: Status: RESOLVED | Noted: 2018-06-19 | Resolved: 2019-08-30

## 2019-08-30 PROBLEM — F41.9 ANXIETY: Chronic | Status: ACTIVE | Noted: 2019-04-16

## 2019-08-30 LAB
ALBUMIN SERPL BCP-MCNC: 3.8 G/DL (ref 3.5–5.2)
ALP SERPL-CCNC: 80 U/L (ref 55–135)
ALT SERPL W/O P-5'-P-CCNC: 12 U/L (ref 10–44)
ANION GAP SERPL CALC-SCNC: 9 MMOL/L (ref 8–16)
AST SERPL-CCNC: 15 U/L (ref 10–40)
BASOPHILS # BLD AUTO: 0.05 K/UL (ref 0–0.2)
BASOPHILS NFR BLD: 0.6 % (ref 0–1.9)
BILIRUB SERPL-MCNC: 0.3 MG/DL (ref 0.1–1)
BUN SERPL-MCNC: 14 MG/DL (ref 6–20)
CALCIUM SERPL-MCNC: 9.8 MG/DL (ref 8.7–10.5)
CHLORIDE SERPL-SCNC: 108 MMOL/L (ref 95–110)
CHOLEST SERPL-MCNC: 138 MG/DL (ref 120–199)
CHOLEST/HDLC SERPL: 2 {RATIO} (ref 2–5)
CO2 SERPL-SCNC: 23 MMOL/L (ref 23–29)
CREAT SERPL-MCNC: 0.7 MG/DL (ref 0.5–1.4)
DIFFERENTIAL METHOD: NORMAL
EOSINOPHIL # BLD AUTO: 0.5 K/UL (ref 0–0.5)
EOSINOPHIL NFR BLD: 5.5 % (ref 0–8)
ERYTHROCYTE [DISTWIDTH] IN BLOOD BY AUTOMATED COUNT: 14 % (ref 11.5–14.5)
EST. GFR  (AFRICAN AMERICAN): >60 ML/MIN/1.73 M^2
EST. GFR  (NON AFRICAN AMERICAN): >60 ML/MIN/1.73 M^2
ESTIMATED AVG GLUCOSE: 91 MG/DL (ref 68–131)
GLUCOSE SERPL-MCNC: 89 MG/DL (ref 70–110)
HBA1C MFR BLD HPLC: 4.8 % (ref 4–5.6)
HCT VFR BLD AUTO: 38.5 % (ref 37–48.5)
HDLC SERPL-MCNC: 68 MG/DL (ref 40–75)
HDLC SERPL: 49.3 % (ref 20–50)
HGB BLD-MCNC: 12.4 G/DL (ref 12–16)
IMM GRANULOCYTES # BLD AUTO: 0.02 K/UL (ref 0–0.04)
IMM GRANULOCYTES NFR BLD AUTO: 0.2 % (ref 0–0.5)
LDLC SERPL CALC-MCNC: 61.8 MG/DL (ref 63–159)
LYMPHOCYTES # BLD AUTO: 1.7 K/UL (ref 1–4.8)
LYMPHOCYTES NFR BLD: 20.4 % (ref 18–48)
MCH RBC QN AUTO: 27.9 PG (ref 27–31)
MCHC RBC AUTO-ENTMCNC: 32.2 G/DL (ref 32–36)
MCV RBC AUTO: 87 FL (ref 82–98)
MONOCYTES # BLD AUTO: 0.5 K/UL (ref 0.3–1)
MONOCYTES NFR BLD: 6.1 % (ref 4–15)
NEUTROPHILS # BLD AUTO: 5.7 K/UL (ref 1.8–7.7)
NEUTROPHILS NFR BLD: 67.2 % (ref 38–73)
NONHDLC SERPL-MCNC: 70 MG/DL
NRBC BLD-RTO: 0 /100 WBC
PLATELET # BLD AUTO: 308 K/UL (ref 150–350)
PMV BLD AUTO: 9.7 FL (ref 9.2–12.9)
POTASSIUM SERPL-SCNC: 4.6 MMOL/L (ref 3.5–5.1)
PROT SERPL-MCNC: 7.4 G/DL (ref 6–8.4)
RBC # BLD AUTO: 4.44 M/UL (ref 4–5.4)
SODIUM SERPL-SCNC: 140 MMOL/L (ref 136–145)
T4 FREE SERPL-MCNC: 0.96 NG/DL (ref 0.71–1.51)
TRIGL SERPL-MCNC: 41 MG/DL (ref 30–150)
TSH SERPL DL<=0.005 MIU/L-ACNC: 2.32 UIU/ML (ref 0.4–4)
WBC # BLD AUTO: 8.5 K/UL (ref 3.9–12.7)

## 2019-08-30 PROCEDURE — 85025 COMPLETE CBC W/AUTO DIFF WBC: CPT

## 2019-08-30 PROCEDURE — 80061 LIPID PANEL: CPT

## 2019-08-30 PROCEDURE — 84439 ASSAY OF FREE THYROXINE: CPT

## 2019-08-30 PROCEDURE — 99999 PR PBB SHADOW E&M-EST. PATIENT-LVL III: CPT | Mod: PBBFAC,,, | Performed by: FAMILY MEDICINE

## 2019-08-30 PROCEDURE — 80053 COMPREHEN METABOLIC PANEL: CPT

## 2019-08-30 PROCEDURE — 84443 ASSAY THYROID STIM HORMONE: CPT

## 2019-08-30 PROCEDURE — 99214 OFFICE O/P EST MOD 30 MIN: CPT | Mod: S$PBB,,, | Performed by: FAMILY MEDICINE

## 2019-08-30 PROCEDURE — 83036 HEMOGLOBIN GLYCOSYLATED A1C: CPT

## 2019-08-30 PROCEDURE — 99214 PR OFFICE/OUTPT VISIT, EST, LEVL IV, 30-39 MIN: ICD-10-PCS | Mod: S$PBB,,, | Performed by: FAMILY MEDICINE

## 2019-08-30 PROCEDURE — 36415 COLL VENOUS BLD VENIPUNCTURE: CPT | Mod: PO

## 2019-08-30 PROCEDURE — 99999 PR PBB SHADOW E&M-EST. PATIENT-LVL III: ICD-10-PCS | Mod: PBBFAC,,, | Performed by: FAMILY MEDICINE

## 2019-08-30 PROCEDURE — 99213 OFFICE O/P EST LOW 20 MIN: CPT | Mod: PBBFAC,PO | Performed by: FAMILY MEDICINE

## 2019-08-30 RX ORDER — PROPRANOLOL HYDROCHLORIDE 20 MG/1
20 TABLET ORAL 2 TIMES DAILY
Qty: 60 TABLET | Refills: 3 | Status: SHIPPED | OUTPATIENT
Start: 2019-08-30 | End: 2019-09-17 | Stop reason: SDUPTHER

## 2019-08-30 NOTE — PROGRESS NOTES
[facility]  Progress Note    SUBJECTIVE:     Chief Complaint   Patient presents with    Headache     Bump right back side of head     Back Pain     Bump on back        HPI   Michael Gonzalez  is a 23 y.o. female here for 2 problems. Headaches has been going on for months. It is usually unilateral, throbbing and associated with photo/phonophobia. Takes ibuprofen which helps at times. Onset is sudden. Gets about 6 hours of sleep at night. Also has a skin growth on her back which she is concerned about. Leaning on it hurts. Occasionally, it does bleed. Patient has no other new complaints/problems at this time.      Review of patient's allergies indicates:  No Known Allergies    Past Medical History:   Diagnosis Date    Allergy     Anxiety     Gallstones      History reviewed. No pertinent surgical history.  Family History   Problem Relation Age of Onset    No Known Problems Mother     Diabetes Father     Hypertension Father      Social History     Tobacco Use    Smoking status: Former Smoker     Types: Cigarettes    Smokeless tobacco: Never Used    Tobacco comment: socially   Substance Use Topics    Alcohol use: No     Frequency: Never    Drug use: No        Review of Systems   Constitutional: Negative for chills, fever and malaise/fatigue.   HENT: Negative.    Respiratory: Negative.  Negative for cough and shortness of breath.    Cardiovascular: Negative.  Negative for chest pain.   Gastrointestinal: Negative.  Negative for abdominal pain, nausea and vomiting.   Genitourinary: Negative.    Skin:        + skin lesion on back   Neurological: Positive for headaches. Negative for sensory change, speech change, loss of consciousness and weakness.   All other systems reviewed and are negative.    OBJECTIVE:     Vitals:    08/30/19 0825   BP: 118/74   Pulse: 84   Temp: 98.5 °F (36.9 °C)     Body mass index is 35.38 kg/m².    Physical Exam   Constitutional: She is oriented to person, place, and time and  well-developed, well-nourished, and in no distress. No distress.   HENT:   Head: Normocephalic and atraumatic.   Right Ear: Tympanic membrane is not perforated, not erythematous and not bulging. No hemotympanum.   Left Ear: Tympanic membrane is not perforated, not erythematous and not bulging. No hemotympanum.   Mouth/Throat: Oropharynx is clear and moist. No oropharyngeal exudate.   Eyes: Pupils are equal, round, and reactive to light. Conjunctivae and EOM are normal.   Neck: No thyromegaly present.   Cardiovascular: Normal rate, regular rhythm and normal heart sounds. Exam reveals no gallop and no friction rub.   No murmur heard.  Pulmonary/Chest: Effort normal and breath sounds normal. No respiratory distress. She has no wheezes. She has no rales.   Abdominal: Soft. Bowel sounds are normal. She exhibits no distension. There is no tenderness. There is no rebound and no guarding.   Musculoskeletal: Normal range of motion. She exhibits no edema or tenderness.   Lymphadenopathy:     She has no cervical adenopathy.   Neurological: She is alert and oriented to person, place, and time. No cranial nerve deficit (no neurological deficits.).   Skin: Skin is warm. No rash noted. She is not diaphoretic. No erythema.   + pedunculated hemangioma of upper back       Old records were reviewed. Labs and/or images were independently reviewed.    ASSESSMENT     1. Skin lesion    2. Other migraine without status migrainosus, not intractable        PLAN:     Skin lesion  -     Ambulatory referral to Dermatology for further evaluation and treatment.    Other migraine without status migrainosus, not intractable  -     Start propranolol (INDERAL) 20 MG tablet; Take 1 tablet (20 mg total) by mouth 2 (two) times daily.  Dispense: 60 tablet; Refill: 3  -     CBC auto differential; Future  -     Comprehensive metabolic panel; Future  -     Hemoglobin A1c; Future  -     Lipid panel; Future  -     TSH; Future  -     T4, free; Future  -      Will start migraine prophylaxis therapy, goal is to decrease migraines by 50%. Currently she is getting about 6-7 migraines a week. Start with propranolol BID for now, and titrate up as needed.      RTC PRN    Christos Land MD  08/30/2019 8:55 AM

## 2019-08-31 NOTE — PROGRESS NOTES
Labs normal  TSH, free T4 normal  CMP normal  CBC normal  Lipid profile good  A1c normal/negative  Results to pt.

## 2019-09-17 ENCOUNTER — TELEPHONE (OUTPATIENT)
Dept: FAMILY MEDICINE | Facility: CLINIC | Age: 24
End: 2019-09-17

## 2019-09-17 DIAGNOSIS — G43.809 OTHER MIGRAINE WITHOUT STATUS MIGRAINOSUS, NOT INTRACTABLE: ICD-10-CM

## 2019-09-17 RX ORDER — PROPRANOLOL HYDROCHLORIDE 20 MG/1
20 TABLET ORAL 2 TIMES DAILY
Qty: 60 TABLET | Refills: 3 | Status: SHIPPED | OUTPATIENT
Start: 2019-09-17 | End: 2021-01-26

## 2019-09-17 NOTE — TELEPHONE ENCOUNTER
----- Message from Sindhu Tillman sent at 9/17/2019  9:34 AM CDT -----  Contact: self  Type: Patient Call Back    Who called:self    What is the request in detail: pt says propranolol (INDERAL) 20 MG tab is not covered by her insurance and was wondering if Dr can make it medically necessary so she can get it.    Can the clinic reply by [facility]?no    Would the patient rather a call back or a response via [facility]? call    Best call back number:

## 2019-09-17 NOTE — TELEPHONE ENCOUNTER
Sent rx that says medically necessary      Spoke with the pt and inform her that the prescription has been sent off to the pharmacy, as medically necessary.  Patient verbalized understandings.

## 2019-10-18 ENCOUNTER — OFFICE VISIT (OUTPATIENT)
Dept: URGENT CARE | Facility: CLINIC | Age: 24
End: 2019-10-18
Payer: MEDICAID

## 2019-10-18 VITALS
OXYGEN SATURATION: 99 % | RESPIRATION RATE: 18 BRPM | BODY MASS INDEX: 35.49 KG/M2 | HEIGHT: 65 IN | HEART RATE: 63 BPM | TEMPERATURE: 99 F | DIASTOLIC BLOOD PRESSURE: 69 MMHG | SYSTOLIC BLOOD PRESSURE: 122 MMHG | WEIGHT: 213 LBS

## 2019-10-18 DIAGNOSIS — J40 BRONCHITIS: Primary | ICD-10-CM

## 2019-10-18 PROCEDURE — 99213 PR OFFICE/OUTPT VISIT, EST, LEVL III, 20-29 MIN: ICD-10-PCS | Mod: 25,S$GLB,, | Performed by: NURSE PRACTITIONER

## 2019-10-18 PROCEDURE — 99213 OFFICE O/P EST LOW 20 MIN: CPT | Mod: 25,S$GLB,, | Performed by: NURSE PRACTITIONER

## 2019-10-18 RX ORDER — BETAMETHASONE SODIUM PHOSPHATE AND BETAMETHASONE ACETATE 3; 3 MG/ML; MG/ML
6 INJECTION, SUSPENSION INTRA-ARTICULAR; INTRALESIONAL; INTRAMUSCULAR; SOFT TISSUE
Status: COMPLETED | OUTPATIENT
Start: 2019-10-18 | End: 2019-10-18

## 2019-10-18 RX ORDER — PREDNISONE 20 MG/1
20 TABLET ORAL DAILY
Qty: 5 TABLET | Refills: 0 | Status: SHIPPED | OUTPATIENT
Start: 2019-10-18 | End: 2019-10-23

## 2019-10-18 RX ADMIN — BETAMETHASONE SODIUM PHOSPHATE AND BETAMETHASONE ACETATE 6 MG: 3; 3 INJECTION, SUSPENSION INTRA-ARTICULAR; INTRALESIONAL; INTRAMUSCULAR; SOFT TISSUE at 12:10

## 2019-10-18 NOTE — PROGRESS NOTES
"Subjective:       Patient ID: Michael Gonzalez is a 24 y.o. female.    Vitals:  height is 5' 5" (1.651 m) and weight is 96.6 kg (213 lb). Her temperature is 98.9 °F (37.2 °C). Her blood pressure is 122/69 and her pulse is 63. Her respiration is 18 and oxygen saturation is 99%.     Chief Complaint: URI    Ambulatory with c/o chest congestion with tightness. Worse at night. Better during day. Symptoms have been consistent over the last three weeks without relief.     URI    This is a new problem. Episode onset: 3 weeks  The problem has been gradually worsening. There has been no fever. Associated symptoms include congestion, coughing and wheezing. Pertinent negatives include no ear pain, nausea, rash, sinus pain, sore throat or vomiting. She has tried decongestant for the symptoms. The treatment provided no relief.       Constitution: Negative. Negative for chills, sweating, fatigue and fever.   HENT: Positive for congestion and postnasal drip. Negative for ear pain, sinus pain, sinus pressure, sore throat and voice change.    Neck: negative. Negative for painful lymph nodes.   Cardiovascular: Negative.    Eyes: Negative.  Negative for eye redness.   Respiratory: Positive for cough, sputum production and wheezing. Negative for chest tightness, bloody sputum, COPD, shortness of breath, stridor and asthma.    Gastrointestinal: Negative.  Negative for nausea and vomiting.   Endocrine: negative.   Genitourinary: Negative.    Musculoskeletal: Negative.  Negative for muscle ache.   Skin: Negative.  Negative for rash.   Allergic/Immunologic: Negative.  Negative for seasonal allergies and asthma.   Neurological: Negative.    Hematologic/Lymphatic: Negative.  Negative for swollen lymph nodes.   Psychiatric/Behavioral: Negative.        Objective:      Physical Exam   Constitutional: She appears well-developed and well-nourished.   HENT:   Head: Normocephalic and atraumatic.   Right Ear: External ear normal.   Left Ear: External " ear normal.   Nose: Nose normal.   Mouth/Throat: Oropharynx is clear and moist.   Eyes: Pupils are equal, round, and reactive to light. Conjunctivae and EOM are normal.   Neck: Normal range of motion. Neck supple.   Cardiovascular: Normal rate, regular rhythm, normal heart sounds and intact distal pulses.   Pulmonary/Chest: Effort normal and breath sounds normal.   Coarse bronchialvesicular bs. Clear with cough   Abdominal: Soft. Bowel sounds are normal.   Musculoskeletal: Normal range of motion.   Neurological: She is alert. No cranial nerve deficit.   Skin: Skin is warm and dry.         Assessment:       1. Bronchitis        Plan:         Bronchitis  -     betamethasone acetate-betamethasone sodium phosphate injection 6 mg      Patient Instructions       What Is Acute Bronchitis?  Acute bronchitis is when the airways in your lungs (bronchial tubes) become red and swollen (inflamed). It is usually caused by a viral infection. But it can also occur because of a bacteria or allergen. Symptoms include a cough that produces yellow or greenish mucus and can last for days or sometimes weeks.  Inside healthy lungs    Air travels in and out of the lungs through the airways. The linings of these airways produce sticky mucus. This mucus traps particles that enter the lungs. Tiny structures called cilia then sweep the particles out of the airways.     Healthy airway: Airways are normally open. Air moves in and out easily.      Healthy cilia: Tiny, hairlike cilia sweep mucus and particles up and out of the airways.   Lungs with bronchitis  Bronchitis often occurs with a cold or the flu virus. The airways become inflamed (red and swollen). There is a deep hacking cough from the extra mucus. Other symptoms may include:  · Wheezing  · Chest discomfort  · Shortness of breath  · Mild fever  A second infection, this time due to bacteria, may then occur. And airways irritated by allergens or smoke are more likely to get  infected.        Inflamed airway: Inflammation and extra mucus narrow the airway, causing shortness of breath.      Impaired cilia: Extra mucus impairs cilia, causing congestion and wheezing. Smoking makes the problem worse.   Making a diagnosis  A physical exam, health history, and certain tests help your healthcare provider make the diagnosis.  Health history  Your healthcare provider will ask you about your symptoms.  The exam  Your provider listens to your chest for signs of congestion. He or she may also check your ears, nose, and throat.  Possible tests  · A sputum test for bacteria. This requires a sample of mucus from your lungs.  · A nasal or throat swab. This tests to see if you have a bacterial infection.  · A chest X-ray. This is done if your healthcare provider thinks you have pneumonia.  · Tests to check for an underlying condition. Other tests may be done to check for things such as allergies, asthma, or COPD (chronic obstructive pulmonary disease). You may need to see a specialist for more lung function testing.  Treating a cough  The main treatment for bronchitis is easing symptoms. Avoiding smoke, allergens, and other things that trigger coughing can often help. If the infection is bacterial, you may be given antibiotics. During the illness, it's important to get plenty of sleep. To ease symptoms:  · Dont smoke. Also avoid secondhand smoke.  · Use a humidifier. Or try breathing in steam from a hot shower. This may help loosen mucus.  · Drink a lot of water and juice. They can soothe the throat and may help thin mucus.  · Sit up or use extra pillows when in bed. This helps to lessen coughing and congestion.  · Ask your provider about using medicine. Ask about using cough medicine, pain and fever medicine, or a decongestant.  Antibiotics  Most cases of bronchitis are caused by cold or flu viruses. They dont need antibiotics to treat them, even if your mucus is thick and green or yellow. Antibiotics  dont treat viral illness and antibiotics have not been shown to have any benefit in cases of acute bronchitis. Taking antibiotics when they are not needed increases your risk of getting an infection later that is antibiotic-resistant. Antibiotics can also cause severe cases of diarrhea that require other antibiotics to treat.  It is important that you accept your healthcare provider's opinion to not use antibiotics. Your provider will prescribe antibiotics if the infection is caused by bacteria. If they are prescribed:  · Take all of the medicine. Take the medicine until it is used up, even if symptoms have improved. If you dont, the bronchitis may come back.  · Take the medicines as directed. For instance, some medicines should be taken with food.  · Ask about side effects. Ask your provider or pharmacist what side effects are common, and what to do about them.  Follow-up care  You should see your provider again in 2 to 3 weeks. By this time, symptoms should have improved. An infection that lasts longer may mean you have a more serious problem.  Prevention  · Avoid tobacco smoke. If you smoke, quit. Stay away from smoky places. Ask friends and family not to smoke around you, or in your home or car.  · Get checked for allergies.  · Ask your provider about getting a yearly flu shot. Also ask about pneumococcal or pneumonia shots.  · Wash your hands often. This helps reduce the chance of picking up viruses that cause colds and flu.  Call your healthcare provider if:  · Symptoms worsen, or you have new symptoms  · Breathing problems worsen or  become severe  · Symptoms dont get better within a week, or within 3 days of taking antibiotics   Date Last Reviewed: 2/1/2017  © 0642-8669 [employer]. [address] [place] [address], [place] [address]. All rights reserved. This information is not intended as a substitute for professional medical care. Always follow your healthcare professional's instructions.      OVER  THE COUNTER RECOMMENDATIONS/SUGGESTIONS.    Make sure to stay well hydrated.    Use Nasal Saline to mechanically move any post nasal drip from your eustachian tube or from the back of your throat.    Use warm salt water gargles to ease your throat pain. Warm salt water gargles as needed for sore throat-  1/2 tsp salt to 1 cup warm water, gargle as desired.    Use an antihistamine such as Claritin, Zyrtec or Allegra to dry you out.     Use pseudoephedrine (behind the counter) to decongest. Pseudoephedrine  30 mg up to 240 mg /day. It can raise your blood pressure and give you palpitations.    Use mucinex (guaifenisin) to break up mucous up to 2400mg/day to loosen any mucous.   The mucinex DM pill has a cough suppressant that can be sedating. It can be used at night to stop the tickle at the back of your throat.  You can use Mucinex D (it has guaifenesin and a high dose of pseudoephedrine) in the mornings to help decongest.      Use Afrin in each nare for no longer than 3 days, as it is addictive. It can also dry out your mucous membranes and cause elevated blood pressure. This is especially useful if you are flying.    Use Flonase 1-2 sprays/nostril per day. It is a local acting steroid nasal spray, if you develop a bloody nose, stop using the medication immediately.    Sometimes Nyquil at night is beneficial to help you get some rest, however it is sedating and it does have an antihistamine, and tylenol.    Honey is a natural cough suppressant that can be used.    Tylenol up to 4,000 mg a day is safe for short periods and can be used for body aches, pain, and fever. However in high doses and prolonged use it can cause liver irritation.    Ibuprofen is a non-steroidal anti-inflammatory that can be used for body aches, pain, and fever.However it can also cause stomach irritation if over used.

## 2019-10-18 NOTE — PATIENT INSTRUCTIONS
What Is Acute Bronchitis?  Acute bronchitis is when the airways in your lungs (bronchial tubes) become red and swollen (inflamed). It is usually caused by a viral infection. But it can also occur because of a bacteria or allergen. Symptoms include a cough that produces yellow or greenish mucus and can last for days or sometimes weeks.  Inside healthy lungs    Air travels in and out of the lungs through the airways. The linings of these airways produce sticky mucus. This mucus traps particles that enter the lungs. Tiny structures called cilia then sweep the particles out of the airways.     Healthy airway: Airways are normally open. Air moves in and out easily.      Healthy cilia: Tiny, hairlike cilia sweep mucus and particles up and out of the airways.   Lungs with bronchitis  Bronchitis often occurs with a cold or the flu virus. The airways become inflamed (red and swollen). There is a deep hacking cough from the extra mucus. Other symptoms may include:  · Wheezing  · Chest discomfort  · Shortness of breath  · Mild fever  A second infection, this time due to bacteria, may then occur. And airways irritated by allergens or smoke are more likely to get infected.        Inflamed airway: Inflammation and extra mucus narrow the airway, causing shortness of breath.      Impaired cilia: Extra mucus impairs cilia, causing congestion and wheezing. Smoking makes the problem worse.   Making a diagnosis  A physical exam, health history, and certain tests help your healthcare provider make the diagnosis.  Health history  Your healthcare provider will ask you about your symptoms.  The exam  Your provider listens to your chest for signs of congestion. He or she may also check your ears, nose, and throat.  Possible tests  · A sputum test for bacteria. This requires a sample of mucus from your lungs.  · A nasal or throat swab. This tests to see if you have a bacterial infection.  · A chest X-ray. This is done if your healthcare  provider thinks you have pneumonia.  · Tests to check for an underlying condition. Other tests may be done to check for things such as allergies, asthma, or COPD (chronic obstructive pulmonary disease). You may need to see a specialist for more lung function testing.  Treating a cough  The main treatment for bronchitis is easing symptoms. Avoiding smoke, allergens, and other things that trigger coughing can often help. If the infection is bacterial, you may be given antibiotics. During the illness, it's important to get plenty of sleep. To ease symptoms:  · Dont smoke. Also avoid secondhand smoke.  · Use a humidifier. Or try breathing in steam from a hot shower. This may help loosen mucus.  · Drink a lot of water and juice. They can soothe the throat and may help thin mucus.  · Sit up or use extra pillows when in bed. This helps to lessen coughing and congestion.  · Ask your provider about using medicine. Ask about using cough medicine, pain and fever medicine, or a decongestant.  Antibiotics  Most cases of bronchitis are caused by cold or flu viruses. They dont need antibiotics to treat them, even if your mucus is thick and green or yellow. Antibiotics dont treat viral illness and antibiotics have not been shown to have any benefit in cases of acute bronchitis. Taking antibiotics when they are not needed increases your risk of getting an infection later that is antibiotic-resistant. Antibiotics can also cause severe cases of diarrhea that require other antibiotics to treat.  It is important that you accept your healthcare provider's opinion to not use antibiotics. Your provider will prescribe antibiotics if the infection is caused by bacteria. If they are prescribed:  · Take all of the medicine. Take the medicine until it is used up, even if symptoms have improved. If you dont, the bronchitis may come back.  · Take the medicines as directed. For instance, some medicines should be taken with food.  · Ask about  side effects. Ask your provider or pharmacist what side effects are common, and what to do about them.  Follow-up care  You should see your provider again in 2 to 3 weeks. By this time, symptoms should have improved. An infection that lasts longer may mean you have a more serious problem.  Prevention  · Avoid tobacco smoke. If you smoke, quit. Stay away from smoky places. Ask friends and family not to smoke around you, or in your home or car.  · Get checked for allergies.  · Ask your provider about getting a yearly flu shot. Also ask about pneumococcal or pneumonia shots.  · Wash your hands often. This helps reduce the chance of picking up viruses that cause colds and flu.  Call your healthcare provider if:  · Symptoms worsen, or you have new symptoms  · Breathing problems worsen or  become severe  · Symptoms dont get better within a week, or within 3 days of taking antibiotics   Date Last Reviewed: 2/1/2017  © 9234-5932 [employer]. [address] [place] [address]. All rights reserved. This information is not intended as a substitute for professional medical care. Always follow your healthcare professional's instructions.      OVER THE COUNTER RECOMMENDATIONS/SUGGESTIONS.    Make sure to stay well hydrated.    Use Nasal Saline to mechanically move any post nasal drip from your eustachian tube or from the back of your throat.    Use warm salt water gargles to ease your throat pain. Warm salt water gargles as needed for sore throat-  1/2 tsp salt to 1 cup warm water, gargle as desired.    Use an antihistamine such as Claritin, Zyrtec or Allegra to dry you out.     Use pseudoephedrine (behind the counter) to decongest. Pseudoephedrine  30 mg up to 240 mg /day. It can raise your blood pressure and give you palpitations.    Use mucinex (guaifenisin) to break up mucous up to 2400mg/day to loosen any mucous.   The mucinex DM pill has a cough suppressant that can be sedating. It can be used at  night to stop the tickle at the back of your throat.  You can use Mucinex D (it has guaifenesin and a high dose of pseudoephedrine) in the mornings to help decongest.      Use Afrin in each nare for no longer than 3 days, as it is addictive. It can also dry out your mucous membranes and cause elevated blood pressure. This is especially useful if you are flying.    Use Flonase 1-2 sprays/nostril per day. It is a local acting steroid nasal spray, if you develop a bloody nose, stop using the medication immediately.    Sometimes Nyquil at night is beneficial to help you get some rest, however it is sedating and it does have an antihistamine, and tylenol.    Honey is a natural cough suppressant that can be used.    Tylenol up to 4,000 mg a day is safe for short periods and can be used for body aches, pain, and fever. However in high doses and prolonged use it can cause liver irritation.    Ibuprofen is a non-steroidal anti-inflammatory that can be used for body aches, pain, and fever.However it can also cause stomach irritation if over used.

## 2019-11-19 ENCOUNTER — OFFICE VISIT (OUTPATIENT)
Dept: FAMILY MEDICINE | Facility: CLINIC | Age: 24
End: 2019-11-19
Payer: MEDICAID

## 2019-11-19 VITALS
TEMPERATURE: 99 F | WEIGHT: 215.81 LBS | OXYGEN SATURATION: 95 % | SYSTOLIC BLOOD PRESSURE: 110 MMHG | HEART RATE: 79 BPM | BODY MASS INDEX: 35.96 KG/M2 | HEIGHT: 65 IN | DIASTOLIC BLOOD PRESSURE: 73 MMHG

## 2019-11-19 DIAGNOSIS — R09.82 PND (POST-NASAL DRIP): ICD-10-CM

## 2019-11-19 DIAGNOSIS — J01.80 ACUTE NON-RECURRENT SINUSITIS OF OTHER SINUS: ICD-10-CM

## 2019-11-19 DIAGNOSIS — G43.809 OTHER MIGRAINE WITHOUT STATUS MIGRAINOSUS, NOT INTRACTABLE: ICD-10-CM

## 2019-11-19 DIAGNOSIS — R06.2 WHEEZING: ICD-10-CM

## 2019-11-19 DIAGNOSIS — E66.9 OBESITY (BMI 30-39.9): ICD-10-CM

## 2019-11-19 DIAGNOSIS — F41.9 ANXIETY: ICD-10-CM

## 2019-11-19 DIAGNOSIS — Z23 ENCOUNTER FOR ADMINISTRATION OF VACCINE: ICD-10-CM

## 2019-11-19 DIAGNOSIS — Z00.00 ANNUAL PHYSICAL EXAM: Primary | ICD-10-CM

## 2019-11-19 PROCEDURE — 99999 PR PBB SHADOW E&M-EST. PATIENT-LVL III: CPT | Mod: PBBFAC,,, | Performed by: FAMILY MEDICINE

## 2019-11-19 PROCEDURE — 99213 OFFICE O/P EST LOW 20 MIN: CPT | Mod: PBBFAC,PO | Performed by: FAMILY MEDICINE

## 2019-11-19 PROCEDURE — 90686 IIV4 VACC NO PRSV 0.5 ML IM: CPT | Mod: PBBFAC,PO

## 2019-11-19 PROCEDURE — 99999 PR PBB SHADOW E&M-EST. PATIENT-LVL III: ICD-10-PCS | Mod: PBBFAC,,, | Performed by: FAMILY MEDICINE

## 2019-11-19 PROCEDURE — 99395 PR PREVENTIVE VISIT,EST,18-39: ICD-10-PCS | Mod: S$PBB,25,, | Performed by: FAMILY MEDICINE

## 2019-11-19 PROCEDURE — 99395 PREV VISIT EST AGE 18-39: CPT | Mod: S$PBB,25,, | Performed by: FAMILY MEDICINE

## 2019-11-19 PROCEDURE — 99214 PR OFFICE/OUTPT VISIT, EST, LEVL IV, 30-39 MIN: ICD-10-PCS | Mod: S$PBB,25,, | Performed by: FAMILY MEDICINE

## 2019-11-19 PROCEDURE — 99214 OFFICE O/P EST MOD 30 MIN: CPT | Mod: S$PBB,25,, | Performed by: FAMILY MEDICINE

## 2019-11-19 RX ORDER — ALBUTEROL SULFATE 90 UG/1
2 AEROSOL, METERED RESPIRATORY (INHALATION) EVERY 4 HOURS PRN
Qty: 18 G | Refills: 2 | Status: SHIPPED | OUTPATIENT
Start: 2019-11-19 | End: 2021-01-26

## 2019-11-19 RX ORDER — FLUTICASONE PROPIONATE 50 MCG
2 SPRAY, SUSPENSION (ML) NASAL DAILY
Qty: 1 BOTTLE | Refills: 2 | Status: SHIPPED | OUTPATIENT
Start: 2019-11-19 | End: 2023-10-26 | Stop reason: CLARIF

## 2019-11-19 RX ORDER — AMOXICILLIN AND CLAVULANATE POTASSIUM 875; 125 MG/1; MG/1
1 TABLET, FILM COATED ORAL EVERY 12 HOURS
Qty: 20 TABLET | Refills: 0 | Status: SHIPPED | OUTPATIENT
Start: 2019-11-19 | End: 2021-01-26

## 2019-11-19 NOTE — PROGRESS NOTES
Office Visit    Patient Name: Michael Gonzalez    : 1995  MRN: [id number]      Assessment/Plan:  Michael Gonzalez is a 24 y.o. female who presents today for :    Annual physical exam  Obesity (BMI 30-39.9)  Encounter for administration of vaccine  -     Influenza - Quadrivalent (PF)  -previous labs reviewed and discussed with patient  -anticipatory guidance provided with age appropriate preventative services discussed, healthy diet and regular physical exercise also discussed with patient  -any additional health maintenance will be readdressed at the next physical if declined or deferred by the patient today   -d/w pt about the importance of portion control  -Recommend 15-30 minutes of moderate intensity exercise 5 days/week.                  Follow up for any evaluation as needed.          Additional Evaluation & Management issues:     In addition to today's Annual Physical, patient has other medical issues that need to be addressed, as well as their associated prescription management that is separate from today's Physical  - as documented separately below the Annual Physical portion of this encounter.           This note was created by combination of typed  and Dragon dictation.  Transcription errors may be present.  If there are any questions, please contact me.        ----------------------------------------------------------------------------------------------------------------------      HPI:  Patient Care Team:  Christos Land MD as PCP - General (Family Medicine)  Asmita Cerna MA as Care Coordinator    Michael CHADWICK is a 24 y.o. female with      Patient Active Problem List   Diagnosis    Anxiety       Patient presents today for:  Follow-up sinus and Nasal Congestion    In addition to addressing complaints above, which is further discussed and addressed in the separate E&M section of this note, patient also recently had Annual Physical bloodwork done, which were reviewed with her today.   Health maintenance-wise, patient is up to date on Pap with GYN earlier this year. She desires to get the flu shot today.Otherwise, no major new changes in health since last checkup with prior PCP  except for acute issues that patient desires to be addressed today as noted in the separate E&M section of this note.      Additional ROS  No F/C/wt changes/fatigue  No dysphagia, +sore throat/rhinorrhea/cough/nasal congestion  No CP/MA/palpitations/swelling  No SOB/nausea/vomiting/abd pain/no diarrhea, no constipation, no blood in stool  No muscle aches, no joint pain   No rashes  No MSK weakness/HA/tingling/numbness  No anxiety/depression  No dysuria/hematuria  No polyuria/polydipsia/fatigue/cold or hot intolerance            Current Medications  Medications reviewed and updated.       Current Outpatient Medications:     paroxetine (PAXIL) 20 MG tablet, Take 1 tablet (20 mg total) by mouth every morning., Disp: 30 tablet, Rfl: 11    propranolol (INDERAL) 20 MG tablet, Take 1 tablet (20 mg total) by mouth 2 (two) times daily., Disp: 60 tablet, Rfl: 3    albuterol (PROVENTIL/VENTOLIN HFA) 90 mcg/actuation inhaler, Inhale 2 puffs into the lungs every 4 (four) hours as needed for Wheezing or Shortness of Breath. Rescue, Disp: 18 g, Rfl: 2    amoxicillin-clavulanate 875-125mg (AUGMENTIN) 875-125 mg per tablet, Take 1 tablet by mouth every 12 (twelve) hours., Disp: 20 tablet, Rfl: 0    fluticasone propionate (FLONASE) 50 mcg/actuation nasal spray, 2 sprays (100 mcg total) by Each Nostril route once daily., Disp: 1 Bottle, Rfl: 2    ibuprofen (ADVIL,MOTRIN) 600 MG tablet, TK 1 T PO  Q 6 H PRN FOR MILD PAIN SCORE 1-3, Disp: , Rfl: 0    norethindrone (MICRONOR) 0.35 mg tablet, Take 1 tablet (0.35 mg total) by mouth once daily. (Patient not taking: Reported on 11/19/2019), Disp: 28 tablet, Rfl: 2    History reviewed. No pertinent surgical history.    Family History   Problem Relation Age of Onset    No Known Problems  "Mother     Diabetes Father     Hypertension Father        Social History     Socioeconomic History    Marital status: Single     Spouse name: Not on file    Number of children: Not on file    Years of education: Not on file    Highest education level: Not on file   Occupational History     Employer: OTHER   Social Needs    Financial resource strain: Not on file    Food insecurity:     Worry: Not on file     Inability: Not on file    Transportation needs:     Medical: Not on file     Non-medical: Not on file   Tobacco Use    Smoking status: Former Smoker     Types: Cigarettes    Smokeless tobacco: Never Used    Tobacco comment: socially   Substance and Sexual Activity    Alcohol use: No     Frequency: Never    Drug use: No    Sexual activity: Yes     Partners: Male     Birth control/protection: None   Lifestyle    Physical activity:     Days per week: Not on file     Minutes per session: Not on file    Stress: Not on file   Relationships    Social connections:     Talks on phone: Not on file     Gets together: Not on file     Attends Restorationism service: Not on file     Active member of club or organization: Not on file     Attends meetings of clubs or organizations: Not on file     Relationship status: Not on file   Other Topics Concern    Not on file   Social History Narrative    Single.  No kids.  .             Allergies   Review of patient's allergies indicates:   Allergen Reactions    Pseudoephedrine hcl Rash and Shortness Of Breath             Review of Systems  See HPI      Physical Exam  /73   Pulse 79   Temp 99.1 °F (37.3 °C)   Ht 5' 5" (1.651 m)   Wt 97.9 kg (215 lb 13.3 oz)   SpO2 95%   BMI 35.92 kg/m²     GEN: NAD, well developed, pleasant, well nourished  HEENT: NCAT, PERRLA, EOMI, sclera clear, anicteric, TM clear bilaterally with normal light reflex, mild nasal turbinate swelling, MMM with no lesions, O/P clear, +minimal clear nasal discharge,  +moderate "   frontal/maxillary TTP  NECK: normal, supple with midline trachea, no LAD, no thyromegaly  LUNGS: CTAB, no w/r/r, no increased work of breathing   HEART: RRR, normal S1 and S2, no m/r/g, no edema  ABD: s/nt/nd, NABS  SKIN: normal turgor, no rashes  PSYCH: AOx3, appropriate mood and affect  MSK: warm/well perfused, normal ROM in all extremities, no c/c/e.  NEURO: normal without focal findings, CN II-XII are grossly intact.  Sensation/strength grossly normal, gait and station normal.         Labs  Lab Results   Component Value Date    HGBA1C 4.8 08/30/2019     Lab Results   Component Value Date     08/30/2019    K 4.6 08/30/2019     08/30/2019    CO2 23 08/30/2019    BUN 14 08/30/2019    CREATININE 0.7 08/30/2019    CALCIUM 9.8 08/30/2019    ANIONGAP 9 08/30/2019    ESTGFRAFRICA >60.0 08/30/2019    EGFRNONAA >60.0 08/30/2019     Lab Results   Component Value Date    CHOL 138 08/30/2019     Lab Results   Component Value Date    HDL 68 08/30/2019     Lab Results   Component Value Date    LDLCALC 61.8 (L) 08/30/2019     Lab Results   Component Value Date    TRIG 41 08/30/2019     Lab Results   Component Value Date    CHOLHDL 49.3 08/30/2019     Last set of blood work has been reviewed as noted above.          __________________________________________________________________________________________________________________________________      Additional Evaluation & Management issues:     In addition to today's Annual Physical, patient has other medical issues that need to be addressed, as well as their associated prescription management that is separate from today's Physical  - as documented separately below      HPI:    Patient presents today for:  Follow-up sinus and Nasal Congestion    Pt has had nasal congestion/cough/sore throat the past 4-5 days  With increasing facial pressure and pain. She was treated for bronchitis about a month ago at , which had resolved after she was given a steroid injection.  "This current episode is different in that she feels her sinus is inflamed with her head pressure getting worse. Patient has tried OTC meds at home without much relief. Her sore throat is mild and tolerable.  No body aches.  No ear pain.  No F/C. She felt like she was wheezing a few days ago, but not today - no hx asthma.            Additional ROS  No F/C/wt changes/fatigue  No dysphagia, +sore throat/rhinorrhea/cough/nasal congestion  No CP/MA/palpitations/swelling  No SOB  No nausea/vomiting/abd pain  No muscle aches, no joint pain   No rashes          Review of Systems  See HPI        Physical Exam  /73   Pulse 79   Temp 99.1 °F (37.3 °C)   Ht 5' 5" (1.651 m)   Wt 97.9 kg (215 lb 13.3 oz)   SpO2 95%   BMI 35.92 kg/m²       GEN: NAD, well developed, pleasant, well nourished  HEENT: NCAT, PERRLA, EOMI, sclera clear, anicteric, TM clear bilaterally with normal light reflex, mild nasal turbinate swelling, MMM with no lesions, O/P clear, +minimal clear nasal discharge,  +moderate   frontal/maxillary TTP  NECK: normal, supple with midline trachea, no LAD, no thyromegaly  LUNGS: CTAB, no w/r/r, no increased work of breathing   HEART: RRR, normal S1 and S2, no m/r/g, no edema  ABD: s/nt/nd, NABS  SKIN: normal turgor, no rashes  PSYCH: AOx3, appropriate mood and affect  MSK: warm/well perfused, normal ROM in all extremities, no c/c/e.                  Assessment/Plan:  Michael Gonzalez is a 24 y.o. female who presents today for :    Acute non-recurrent sinusitis of other sinus  -     amoxicillin-clavulanate 875-125mg (AUGMENTIN) 875-125 mg per tablet; Take 1 tablet by mouth every 12 (twelve) hours.  Dispense: 20 tablet; Refill: 0  PND (post-nasal drip)  -     fluticasone propionate (FLONASE) 50 mcg/actuation nasal spray; 2 sprays (100 mcg total) by Each Nostril route once daily.  Dispense: 1 Bottle; Refill: 2  Wheezing  -     albuterol (PROVENTIL/VENTOLIN HFA) 90 mcg/actuation inhaler; Inhale 2 puffs into the " lungs every 4 (four) hours as needed for Wheezing or Shortness of Breath. Rescue  Dispense: 18 g; Refill: 2    -start Abx. Advised cont supportive therapy and symptomatic management. Flonase/Claritin as needed.  May try nasal rinses.  -stressed hydration (water) and rest, as well as frequent hand washing and covering coughs to prevent spread of respiratory illnesses. Tylenol as needed for fever/muscle aches/headaches  -RTC if Sx worsens or call clinic back if pt has any concerns.      Anxiety  -stable, continue current regimen     Other migraine without status migrainosus, not intractable  -stable, continue current regimen   -f/u as needed        Follow up for worsening Sx. Urgent care/ED precautions provided.

## 2020-02-26 ENCOUNTER — TELEPHONE (OUTPATIENT)
Dept: OBSTETRICS AND GYNECOLOGY | Facility: CLINIC | Age: 25
End: 2020-02-26

## 2020-02-26 DIAGNOSIS — N91.2 AMENORRHEA: Primary | ICD-10-CM

## 2020-02-26 NOTE — TELEPHONE ENCOUNTER
----- Message from Cara Kincaid sent at 2/26/2020  8:40 AM CST -----  Yes please.      Thanks   ----- Message -----  From: Rand Calderón MA  Sent: 2/24/2020   1:41 PM CST  To: Cara Kincaid    Let me know if you need orders.   ----- Message -----  From: Julisa Infante LPN  Sent: 2/21/2020   3:30 PM CST  To: [facility] Staff    Called pt to schedule appointment. Pt stated that she wants to see Dr. Nelson only!! Please call pt and schedule appt. Thanks    ----- Message -----  From: Julisa Infante LPN  Sent: 2/20/2020   4:10 PM CST  To: Julisa Infante LPN    ----- Message -----   From: Clari Us   Sent: 2/20/2020   3:49 PM CST   To: Cara Kincaid       Who Called : KIKI KEN [[id number]]     Date of Positive Preg Test : 2/19/20     1st day of Last Menstrual Cycle :1/21/20     List Any Difficulties : No     What Number to Call Back : [phone]     Can the clinic reply in [facility] :No

## 2020-11-24 ENCOUNTER — CLINICAL SUPPORT (OUTPATIENT)
Dept: URGENT CARE | Facility: CLINIC | Age: 25
End: 2020-11-24
Payer: MEDICAID

## 2020-11-24 DIAGNOSIS — Z11.52 ENCOUNTER FOR SCREENING LABORATORY TESTING FOR COVID-19 VIRUS: Primary | ICD-10-CM

## 2020-11-24 LAB
CTP QC/QA: YES
SARS-COV-2 RDRP RESP QL NAA+PROBE: NEGATIVE

## 2020-11-24 PROCEDURE — U0002 COVID-19 LAB TEST NON-CDC: HCPCS | Mod: QW,S$GLB,, | Performed by: PHYSICIAN ASSISTANT

## 2020-11-24 PROCEDURE — U0002: ICD-10-PCS | Mod: QW,S$GLB,, | Performed by: PHYSICIAN ASSISTANT

## 2020-11-24 NOTE — LETTER
[address] [facility], [facility] [place] [address]  Phone: [phone]  Fax: [phone]          Return to Work/School    Patient: Michael Gonzalez  YOB: 1995   Date: 11/24/2020     To Whom It May Concern:     Michael Gonzalez was in contact with/seen in my office on 11/24/2020. COVID-19 is present in our communities across the state. There is limited testing for COVID at this time, so not all patients can be tested. In this situation, your employee meets the following criteria:     Michael Gonzalez has met the criteria for COVID-19 testing and has a NEGATIVE result. The employee can return to work once they are asymptomatic for 24 hours without the use of fever reducing medications (Tylenol, Motrin, etc).     If you have any questions or concerns, or if I can be of further assistance, please do not hesitate to contact me.     Sincerely,    NURSE URGENT CARE, [facility]

## 2020-11-25 NOTE — PATIENT INSTRUCTIONS
"Your test was NEGATIVE for COVID-19 (coronavirus).      You may leave home and/or return to work when the following conditions are met:   24 hours fever free without fever-reducing medications AND   Improved symptoms   You have not met the conditions of a closed exposure       A "close exposure" is defined as anyone who has had an exposure (masked or unmasked) to a known COVID -19 positive person within 6 ft for longer than 15 minutes. If your exposure meets this definition you are required by CDC guidelines to quarantine for 14 days from time of exposure regardless of test status.      Additional instructions:  · Social distance per your local guidelines  · Call ahead before visiting your doctor.  · Wear a facemask when around others who do not live in your household.  · Cover your coughs and sneezes.  · Wash your hands often with soap and water; hand  can be used, too.      If your symptoms worsen or if you have any other concerns, please contact [facility] at [phone].     Sincerely,    Julian Ruiz, RT  "

## 2021-01-05 ENCOUNTER — PATIENT MESSAGE (OUTPATIENT)
Dept: ADMINISTRATIVE | Facility: HOSPITAL | Age: 26
End: 2021-01-05

## 2021-01-26 ENCOUNTER — OFFICE VISIT (OUTPATIENT)
Dept: FAMILY MEDICINE | Facility: CLINIC | Age: 26
End: 2021-01-26
Payer: MEDICAID

## 2021-01-26 VITALS
BODY MASS INDEX: 36.97 KG/M2 | TEMPERATURE: 98 F | HEIGHT: 65 IN | OXYGEN SATURATION: 97 % | SYSTOLIC BLOOD PRESSURE: 98 MMHG | DIASTOLIC BLOOD PRESSURE: 64 MMHG | WEIGHT: 221.88 LBS | HEART RATE: 92 BPM

## 2021-01-26 DIAGNOSIS — R30.0 DYSURIA: Primary | ICD-10-CM

## 2021-01-26 DIAGNOSIS — R31.9 URINARY TRACT INFECTION WITH HEMATURIA, SITE UNSPECIFIED: ICD-10-CM

## 2021-01-26 DIAGNOSIS — R39.15 URINARY URGENCY: ICD-10-CM

## 2021-01-26 DIAGNOSIS — N39.0 URINARY TRACT INFECTION WITH HEMATURIA, SITE UNSPECIFIED: ICD-10-CM

## 2021-01-26 LAB
BACTERIA #/AREA URNS AUTO: ABNORMAL /HPF
BILIRUB SERPL-MCNC: NEGATIVE MG/DL
BILIRUB UR QL STRIP: NEGATIVE
BLOOD URINE, POC: 250
CLARITY UR REFRACT.AUTO: ABNORMAL
COLOR UR AUTO: YELLOW
COLOR, POC UA: ABNORMAL
GLUCOSE UR QL STRIP: NEGATIVE
GLUCOSE UR QL STRIP: NEGATIVE
HGB UR QL STRIP: ABNORMAL
HYALINE CASTS UR QL AUTO: 0 /LPF
KETONES UR QL STRIP: NEGATIVE
KETONES UR QL STRIP: NEGATIVE
LEUKOCYTE ESTERASE UR QL STRIP: ABNORMAL
LEUKOCYTE ESTERASE URINE, POC: POSITIVE
MICROSCOPIC COMMENT: ABNORMAL
NITRITE UR QL STRIP: POSITIVE
NITRITE, POC UA: POSITIVE
PH UR STRIP: 6 [PH] (ref 5–8)
PH, POC UA: 5
PROT UR QL STRIP: ABNORMAL
PROTEIN, POC: ABNORMAL
RBC #/AREA URNS AUTO: 29 /HPF (ref 0–4)
SP GR UR STRIP: 1.01 (ref 1–1.03)
SPECIFIC GRAVITY, POC UA: 1.01
SQUAMOUS #/AREA URNS AUTO: 2 /HPF
URN SPEC COLLECT METH UR: ABNORMAL
UROBILINOGEN, POC UA: NORMAL
WBC #/AREA URNS AUTO: >100 /HPF (ref 0–5)
WBC CLUMPS UR QL AUTO: ABNORMAL

## 2021-01-26 PROCEDURE — 99214 PR OFFICE/OUTPT VISIT, EST, LEVL IV, 30-39 MIN: ICD-10-PCS | Mod: S$PBB,,, | Performed by: NURSE PRACTITIONER

## 2021-01-26 PROCEDURE — 99214 OFFICE O/P EST MOD 30 MIN: CPT | Mod: PBBFAC,PO | Performed by: NURSE PRACTITIONER

## 2021-01-26 PROCEDURE — 99999 PR PBB SHADOW E&M-EST. PATIENT-LVL IV: CPT | Mod: PBBFAC,,, | Performed by: NURSE PRACTITIONER

## 2021-01-26 PROCEDURE — 87088 URINE BACTERIA CULTURE: CPT

## 2021-01-26 PROCEDURE — 81001 URINALYSIS AUTO W/SCOPE: CPT | Mod: PBBFAC,PO | Performed by: NURSE PRACTITIONER

## 2021-01-26 PROCEDURE — 99214 OFFICE O/P EST MOD 30 MIN: CPT | Mod: S$PBB,,, | Performed by: NURSE PRACTITIONER

## 2021-01-26 PROCEDURE — 81001 URINALYSIS AUTO W/SCOPE: CPT

## 2021-01-26 PROCEDURE — 99999 PR PBB SHADOW E&M-EST. PATIENT-LVL IV: ICD-10-PCS | Mod: PBBFAC,,, | Performed by: NURSE PRACTITIONER

## 2021-01-26 PROCEDURE — 87086 URINE CULTURE/COLONY COUNT: CPT

## 2021-01-26 PROCEDURE — 87186 SC STD MICRODIL/AGAR DIL: CPT

## 2021-01-26 PROCEDURE — 87077 CULTURE AEROBIC IDENTIFY: CPT

## 2021-01-26 RX ORDER — CEPHALEXIN 500 MG/1
500 CAPSULE ORAL EVERY 8 HOURS
Qty: 21 CAPSULE | Refills: 0 | Status: SHIPPED | OUTPATIENT
Start: 2021-01-26 | End: 2021-02-02

## 2021-01-29 LAB — BACTERIA UR CULT: ABNORMAL

## 2022-03-08 ENCOUNTER — OFFICE VISIT (OUTPATIENT)
Dept: URGENT CARE | Facility: CLINIC | Age: 27
End: 2022-03-08
Payer: MEDICAID

## 2022-03-08 VITALS
DIASTOLIC BLOOD PRESSURE: 86 MMHG | WEIGHT: 221 LBS | HEART RATE: 71 BPM | HEIGHT: 65 IN | TEMPERATURE: 98 F | SYSTOLIC BLOOD PRESSURE: 134 MMHG | OXYGEN SATURATION: 96 % | BODY MASS INDEX: 36.82 KG/M2 | RESPIRATION RATE: 16 BRPM

## 2022-03-08 DIAGNOSIS — H66.91 RIGHT OTITIS MEDIA, UNSPECIFIED OTITIS MEDIA TYPE: Primary | ICD-10-CM

## 2022-03-08 DIAGNOSIS — R05.8 POST-VIRAL COUGH SYNDROME: ICD-10-CM

## 2022-03-08 PROCEDURE — 1159F PR MEDICATION LIST DOCUMENTED IN MEDICAL RECORD: ICD-10-PCS | Mod: CPTII,S$GLB,, | Performed by: PHYSICIAN ASSISTANT

## 2022-03-08 PROCEDURE — 3008F BODY MASS INDEX DOCD: CPT | Mod: CPTII,S$GLB,, | Performed by: PHYSICIAN ASSISTANT

## 2022-03-08 PROCEDURE — 1159F MED LIST DOCD IN RCRD: CPT | Mod: CPTII,S$GLB,, | Performed by: PHYSICIAN ASSISTANT

## 2022-03-08 PROCEDURE — 3075F SYST BP GE 130 - 139MM HG: CPT | Mod: CPTII,S$GLB,, | Performed by: PHYSICIAN ASSISTANT

## 2022-03-08 PROCEDURE — 99213 PR OFFICE/OUTPT VISIT, EST, LEVL III, 20-29 MIN: ICD-10-PCS | Mod: S$GLB,,, | Performed by: PHYSICIAN ASSISTANT

## 2022-03-08 PROCEDURE — 3008F PR BODY MASS INDEX (BMI) DOCUMENTED: ICD-10-PCS | Mod: CPTII,S$GLB,, | Performed by: PHYSICIAN ASSISTANT

## 2022-03-08 PROCEDURE — 3075F PR MOST RECENT SYSTOLIC BLOOD PRESS GE 130-139MM HG: ICD-10-PCS | Mod: CPTII,S$GLB,, | Performed by: PHYSICIAN ASSISTANT

## 2022-03-08 PROCEDURE — 1160F PR REVIEW ALL MEDS BY PRESCRIBER/CLIN PHARMACIST DOCUMENTED: ICD-10-PCS | Mod: CPTII,S$GLB,, | Performed by: PHYSICIAN ASSISTANT

## 2022-03-08 PROCEDURE — 1160F RVW MEDS BY RX/DR IN RCRD: CPT | Mod: CPTII,S$GLB,, | Performed by: PHYSICIAN ASSISTANT

## 2022-03-08 PROCEDURE — 3079F DIAST BP 80-89 MM HG: CPT | Mod: CPTII,S$GLB,, | Performed by: PHYSICIAN ASSISTANT

## 2022-03-08 PROCEDURE — 99213 OFFICE O/P EST LOW 20 MIN: CPT | Mod: S$GLB,,, | Performed by: PHYSICIAN ASSISTANT

## 2022-03-08 PROCEDURE — 3079F PR MOST RECENT DIASTOLIC BLOOD PRESSURE 80-89 MM HG: ICD-10-PCS | Mod: CPTII,S$GLB,, | Performed by: PHYSICIAN ASSISTANT

## 2022-03-08 RX ORDER — BENZONATATE 100 MG/1
100 CAPSULE ORAL 3 TIMES DAILY PRN
Qty: 20 CAPSULE | Refills: 0 | Status: SHIPPED | OUTPATIENT
Start: 2022-03-08 | End: 2022-04-07

## 2022-03-08 RX ORDER — PROMETHAZINE HYDROCHLORIDE AND DEXTROMETHORPHAN HYDROBROMIDE 6.25; 15 MG/5ML; MG/5ML
5 SYRUP ORAL EVERY 6 HOURS PRN
Qty: 120 ML | Refills: 0 | Status: SHIPPED | OUTPATIENT
Start: 2022-03-08 | End: 2022-03-18

## 2022-03-08 NOTE — PROGRESS NOTES
"Subjective:       Patient ID: Michael Gonzalez is a 26 y.o. female.    Vitals:  height is 5' 5" (1.651 m) and weight is 100.2 kg (221 lb). Her temporal temperature is 97.5 °F (36.4 °C). Her blood pressure is 134/86 and her pulse is 71. Her respiration is 16 and oxygen saturation is 96%.     Chief Complaint: Otalgia    Pt stated that she recently had covid in January.  Pt stated that she has been having a lingering cough and occasional rhinorrhea ever since . Pt also stated that she has been having a sore throat, hoarse voice, and right ear pain starting 4-5 days ago.  She denies any recent fever or chills.. Pts pharmacy has been updated in her chart.  Patient was given a prescription for doxycycline 100 mg b.i.d. that she picked up today for her acne, but has not started yet.    Otalgia   There is pain in the right ear. This is a new problem. The current episode started in the past 7 days. The problem occurs constantly. The problem has been unchanged. There has been no fever. The pain is at a severity of 3/10. Associated symptoms include coughing and a sore throat. Pertinent negatives include no abdominal pain, diarrhea, ear discharge, headaches, hearing loss, neck pain, rash, rhinorrhea or vomiting. She has tried nothing for the symptoms. The treatment provided no relief.       Constitution: Negative for chills and fever.   HENT: Positive for ear pain, congestion, sore throat and voice change (hoarse). Negative for ear discharge and hearing loss.    Neck: Negative for neck pain.   Respiratory: Positive for cough.    Gastrointestinal: Negative for abdominal pain, vomiting and diarrhea.   Skin: Negative for rash.   Neurological: Negative for headaches.       Objective:      Physical Exam   Constitutional: She is oriented to person, place, and time. She appears well-developed. No distress.   HENT:   Head: Normocephalic and atraumatic.   Ears:   Right Ear: External ear and ear canal normal. Right ear laceration: cloudy. " No drainage or swelling. Tympanic membrane is injected and bulging. A middle ear effusion is present.   Left Ear: Hearing, tympanic membrane, external ear and ear canal normal.   Nose: Mucosal edema present.   Mouth/Throat: Uvula is midline and oropharynx is clear and moist. Tonsils are 1+ on the right. Tonsils are 1+ on the left.   Eyes: Conjunctivae are normal.   Cardiovascular: Normal rate, regular rhythm and normal heart sounds.   No murmur heard.Exam reveals no gallop and no friction rub.   Pulmonary/Chest: Effort normal and breath sounds normal. No respiratory distress. She has no wheezes.   Musculoskeletal: Normal range of motion.         General: No tenderness. Normal range of motion.   Neurological: She is alert and oriented to person, place, and time.   Skin: Skin is warm, dry and not diaphoretic.   Psychiatric: Her behavior is normal. Judgment and thought content normal.   Nursing note and vitals reviewed.        Assessment:       1. Right otitis media, unspecified otitis media type    2. Post-viral cough syndrome          Plan:         Right otitis media, unspecified otitis media type    Post-viral cough syndrome  -     benzonatate (TESSALON) 100 MG capsule; Take 1 capsule (100 mg total) by mouth 3 (three) times daily as needed for Cough.  Dispense: 20 capsule; Refill: 0  -     promethazine-dextromethorphan (PROMETHAZINE-DM) 6.25-15 mg/5 mL Syrp; Take 5 mLs by mouth every 6 (six) hours as needed (cough).  Dispense: 120 mL; Refill: 0                   Patient Instructions   - Rest.  - Drink plenty of fluids.  - Take Tylenol and/or Ibuprofen as directed as needed for fever/pain.  Do not take more than the recommended dose.  - follow up with your PCP within the next 1-2 weeks as needed.   - Take over-the-counter claritin, zyrtec, allegra, or xyzal as directed.  You should NOT use a decongestant form (D) of this medication if you have a history of hypertension or heart disease.   - Use over the counter  Flonase as directed for sinus congestion and postnasal drip.  - use nasal saline prior to Flonase.  - stop using Flonase if you developed nosebleeds.   - Use Ocean Spray Nasal Saline 1-3 puffs each nostril every 2-3 hours then blow out onto tissue. This is to irrigate the nasal passage way to clear the sinus openings. Use until sinus problem resolved.   - Begin the Doxycycline as prescribed.  - You must understand that you have received an Urgent Care treatment only and that you may be released before all of your medical problems are known or treated.   - You, the patient, will arrange for follow up care as instructed.   - If your condition worsens or fails to improve we recommend that you receive another evaluation at the ER immediately or contact your PCP to discuss your concerns.   - You can call [phone] or [phone] to help schedule an appointment with the appropriate provider.

## 2022-03-08 NOTE — PATIENT INSTRUCTIONS
- Rest.  - Drink plenty of fluids.  - Take Tylenol and/or Ibuprofen as directed as needed for fever/pain.  Do not take more than the recommended dose.  - follow up with your PCP within the next 1-2 weeks as needed.   - Take over-the-counter claritin, zyrtec, allegra, or xyzal as directed.  You should NOT use a decongestant form (D) of this medication if you have a history of hypertension or heart disease.   - Use over the counter Flonase as directed for sinus congestion and postnasal drip.  - use nasal saline prior to Flonase.  - stop using Flonase if you developed nosebleeds.   - Use Ocean Spray Nasal Saline 1-3 puffs each nostril every 2-3 hours then blow out onto tissue. This is to irrigate the nasal passage way to clear the sinus openings. Use until sinus problem resolved.   - Begin the Doxycycline as prescribed.  - You must understand that you have received an Urgent Care treatment only and that you may be released before all of your medical problems are known or treated.   - You, the patient, will arrange for follow up care as instructed.   - If your condition worsens or fails to improve we recommend that you receive another evaluation at the ER immediately or contact your PCP to discuss your concerns.   - You can call [phone] or [phone] to help schedule an appointment with the appropriate provider.

## 2022-05-10 ENCOUNTER — OFFICE VISIT (OUTPATIENT)
Dept: URGENT CARE | Facility: CLINIC | Age: 27
End: 2022-05-10
Payer: MEDICAID

## 2022-05-10 VITALS
SYSTOLIC BLOOD PRESSURE: 113 MMHG | OXYGEN SATURATION: 99 % | RESPIRATION RATE: 16 BRPM | BODY MASS INDEX: 32.49 KG/M2 | DIASTOLIC BLOOD PRESSURE: 71 MMHG | WEIGHT: 195 LBS | HEIGHT: 65 IN | TEMPERATURE: 97 F | HEART RATE: 95 BPM

## 2022-05-10 DIAGNOSIS — R50.9 FEVER, UNSPECIFIED FEVER CAUSE: ICD-10-CM

## 2022-05-10 DIAGNOSIS — U07.1 COVID-19: Primary | ICD-10-CM

## 2022-05-10 LAB
CTP QC/QA: YES
CTP QC/QA: YES
POC MOLECULAR INFLUENZA A AGN: NEGATIVE
POC MOLECULAR INFLUENZA B AGN: NEGATIVE
SARS-COV-2 RDRP RESP QL NAA+PROBE: POSITIVE

## 2022-05-10 PROCEDURE — 99213 OFFICE O/P EST LOW 20 MIN: CPT | Mod: S$GLB,,,

## 2022-05-10 PROCEDURE — 3008F BODY MASS INDEX DOCD: CPT | Mod: CPTII,S$GLB,,

## 2022-05-10 PROCEDURE — U0002: ICD-10-PCS | Mod: QW,S$GLB,,

## 2022-05-10 PROCEDURE — 1160F RVW MEDS BY RX/DR IN RCRD: CPT | Mod: CPTII,S$GLB,,

## 2022-05-10 PROCEDURE — 1160F PR REVIEW ALL MEDS BY PRESCRIBER/CLIN PHARMACIST DOCUMENTED: ICD-10-PCS | Mod: CPTII,S$GLB,,

## 2022-05-10 PROCEDURE — 3074F SYST BP LT 130 MM HG: CPT | Mod: CPTII,S$GLB,,

## 2022-05-10 PROCEDURE — 1159F PR MEDICATION LIST DOCUMENTED IN MEDICAL RECORD: ICD-10-PCS | Mod: CPTII,S$GLB,,

## 2022-05-10 PROCEDURE — 3078F PR MOST RECENT DIASTOLIC BLOOD PRESSURE < 80 MM HG: ICD-10-PCS | Mod: CPTII,S$GLB,,

## 2022-05-10 PROCEDURE — 87502 INFLUENZA DNA AMP PROBE: CPT | Mod: QW,S$GLB,,

## 2022-05-10 PROCEDURE — 3074F PR MOST RECENT SYSTOLIC BLOOD PRESSURE < 130 MM HG: ICD-10-PCS | Mod: CPTII,S$GLB,,

## 2022-05-10 PROCEDURE — 3008F PR BODY MASS INDEX (BMI) DOCUMENTED: ICD-10-PCS | Mod: CPTII,S$GLB,,

## 2022-05-10 PROCEDURE — 1159F MED LIST DOCD IN RCRD: CPT | Mod: CPTII,S$GLB,,

## 2022-05-10 PROCEDURE — 99213 PR OFFICE/OUTPT VISIT, EST, LEVL III, 20-29 MIN: ICD-10-PCS | Mod: S$GLB,,,

## 2022-05-10 PROCEDURE — U0002 COVID-19 LAB TEST NON-CDC: HCPCS | Mod: QW,S$GLB,,

## 2022-05-10 PROCEDURE — 3078F DIAST BP <80 MM HG: CPT | Mod: CPTII,S$GLB,,

## 2022-05-10 PROCEDURE — 87502 POCT INFLUENZA A/B MOLECULAR: ICD-10-PCS | Mod: QW,S$GLB,,

## 2022-05-10 NOTE — PATIENT INSTRUCTIONS
You have tested positive for COVID-19 today.      ISOLATION  If you tested positive and do not have symptoms, you must isolate for 5 days starting on the day of the positive test. I    If you tested positive and have symptoms, you must isolate for 5 days starting on the day of the first symptoms,  not the day of the positive test.     This is the most important part, both the CDC and the [facility] emphasize that you do not test out of isolation.     After 5 days, if your symptoms have improved and you have not had fever on day 5, you can return to the community on day 6- NO TESTING REQUIRED!      In fact, we do not retest if you were positive in the last 90 days.    After your 5 days of isolation are completed, the CDC recommends strict mask use for the first 5 days that you come out of isolation.    Return to Urgent Care or go to ER if symptoms worsen or fail to improve.  Follow up with PCP as recommended for further management.     Your symptoms should begin to improve by Day 5 of the infection-- if symptoms worsen, you develop a new fever, shortness of breath, worsening shortness of breath with activity, chest pain, worsening cough, you must return to Urgent Care or go to the ER.    PLEASE READ YOUR DISCHARGE INSTRUCTIONS ENTIRELY AS IT CONTAINS IMPORTANT INFORMATION.      Please drink plenty of fluids.    Please get plenty of rest.    Please return here or go to the Emergency Department for any concerns or worsening of condition.      Tylenol or ibuprofen can also be used as directed for pain and fever unless you have an allergy to them or medical condition such as stomach ulcers, kidney or liver disease or blood thinners etc for which you should not be taking these type of medications.   YOU CAN ALTERNATE TYLENOL AND IBUPROFEN EVERY 3-4 HOURS. Take 1000mg (2 pills) of Extra Strength Acetaminophen (Tylenol) every 6 hours and 600mg (3 pills) of Ibuprofen (Motrin/Advil) every 6 hours, alternating the two so that every 3  hours you take one or the other. Start with the Tylenol, then 3 hours later take the Ibuprofen, then 3 hours later take the Tylenol again, and so on.         For your allergy symptoms and/or runny nose, sinus/ear pressure, congestion:        - You can take over-the-counter claritin, zyrtec, allegra, or xyzal as directed. These are antihistamines that can help with runny nose, nasal congestion, sneezing, and helps to dry up post-nasal drip, which usually causes sore throat and cough.               - If you do NOT have high blood pressure, you may use a decongestant form (D)  of this medication (ie. Claritin- D, zyrtec-D, allegra-D) or if you do not take the D form, you can take sudafed (pseudoephedrine) over the counter, which is a decongestant. Do NOT take two decongestant (D) medications at the same time (such as mucinex-D and claritin-D or plain sudafed and claritin D). Dextromethorphan (DM) is a cough suppressant over the counter (ie. mucinex DM, robitussin, delsym; dayquil/nyquil has DM as well.)    -If you DO have high blood pressure, AVOID DECONGESTANTS (I.e., Phenylephrine and Pseudoephedrine). You may take Coricidin HBP for sinus congestion and Delsym for cough.        - You can take plain Mucinex (guaifenesin) 1200 mg twice a day to help loosen mucous.       Use over the counter flonase or nasocort: one spray each nostril twice daily OR two sprays each nostril once daily until nares dry out, unless you have Glaucoma.   If you find this dries your nose out or your nose bleeds, try using over the counter nasal saline a few minutes prior to using the flonase to moisten the lining of your nose and throughout the day as needed.   Flonase/nasal spray use directions:  1) Use once per day.  2) Blow nose first.  3) Close one nostril and spray flonase up the other nostril while inhaling gently.   4) If you inhale to aggressively, you will have a bitter taste in the back of your mouth.       You can try breathe right  strips at night to help you breathe.  A cool mist humidifier in bedroom may help with cough and relieve stuffy nose.     Sinus rinses DO NOT USE TAP WATER, if you must, water must be a rolling boil for 1 minute, let it cool, then use.  May use distilled water, or over the counter nasal saline rinses.  Vics vapor rub in shower to help open nasal passages.  May use nasal gel to keep passages moisturized.  May use Nasal saline sprays during the day for added relief of congestion.   For those who go to the gym, please do not use the sauna or steam room now to clear sinuses.      Sore throat recommendations: Warm fluids, warm salt water gargles, throat lozenges, tea, honey, soup, rest, hydration.      Cough     Honey with yahir to soothe your throat    Robitussin or Delsyum for cough suppressant for dry cough.    Mucinex DM or products containing Guaifenesin or Dextromethorphan for expectorant (wet cough).    Take prescription cough meds (pills) as prescribed; take prescription cough syrup at night as needed for cough.  Do not take both the prescribed cough pills and syrup at the same time or within 6 hours of each other.  Do not take the cough syrup with any other sedative medication as it can can cause drowsiness. Do not operate any heavy machinery, drink or drive while taking the cough syrup.    Try taking half a dose first of the cough syrup to see how it affects you.       Please follow up with your primary care doctor or specialist in the next 48-72hrs as needed and if no improvement    If you  smoke, please stop smoking.      Please return or see your primary care doctor if you develop new or worsening symptoms.     Please arrange follow up with your primary medical clinic as soon as possible. You must understand that you've received an Urgent Care treatment only and that you may be released before all of your medical problems are known or treated. You, the patient, will arrange for follow up as instructed. If  your symptoms worsen or fail to improve you should go to the Emergency Room.

## 2022-05-10 NOTE — PROGRESS NOTES
"Subjective:       Patient ID: Michael Gonzalez is a 26 y.o. female.    Vitals:  height is 5' 5" (1.651 m) and weight is 88.5 kg (195 lb). Her tympanic temperature is 97 °F (36.1 °C). Her blood pressure is 113/71 and her pulse is 95. Her respiration is 16 and oxygen saturation is 99%.     Chief Complaint: Fever    27 yo female presents to urgent care for evaluation. Patient c/o fever, fatigue, body aches/headache, and nasal congestion since yesterday. She was exposed to Covid and would like testing. She has taken ibuprofen for her symptoms with mild relief. Denies fever, CP, SOB, weakness, abdominal sx, and other associated complaints.      Fever   This is a new problem. The current episode started yesterday. The problem occurs constantly. The problem has been waxing and waning. The maximum temperature noted was 101 to 101.9 F. The temperature was taken using an oral thermometer. Associated symptoms include congestion, coughing, diarrhea (resolved), headaches, nausea (resolved) and a sore throat. Pertinent negatives include no abdominal pain, chest pain, ear pain, vomiting or wheezing. She has tried NSAIDs for the symptoms. The treatment provided mild relief.       Constitution: Positive for fatigue and fever. Negative for chills and unexpected weight change.   HENT: Positive for congestion, sinus pain, sinus pressure and sore throat. Negative for ear pain, ear discharge and trouble swallowing.    Neck: Negative for neck pain and neck stiffness.   Cardiovascular: Negative for chest pain.   Eyes: Negative for eye pain.   Respiratory: Positive for cough. Negative for sputum production, shortness of breath and wheezing.    Gastrointestinal: Positive for nausea (resolved) and diarrhea (resolved). Negative for abdominal pain and vomiting.   Musculoskeletal: Positive for muscle ache.   Neurological: Positive for headaches. Negative for dizziness.       Objective:      Physical Exam   Constitutional: She is oriented to " person, place, and time. She appears well-developed. She is cooperative.  Non-toxic appearance. She does not appear ill. No distress.   HENT:   Head: Normocephalic and atraumatic.   Ears:   Right Ear: Hearing, tympanic membrane, external ear and ear canal normal.   Left Ear: Hearing, tympanic membrane, external ear and ear canal normal.   Nose: Nose normal. No mucosal edema, rhinorrhea or nasal deformity. No epistaxis. Right sinus exhibits no maxillary sinus tenderness and no frontal sinus tenderness. Left sinus exhibits no maxillary sinus tenderness and no frontal sinus tenderness.   Mouth/Throat: Uvula is midline, oropharynx is clear and moist and mucous membranes are normal. No trismus in the jaw. Normal dentition. No uvula swelling. No oropharyngeal exudate, posterior oropharyngeal edema or posterior oropharyngeal erythema.   Eyes: Conjunctivae and lids are normal. No scleral icterus.   Neck: Trachea normal and phonation normal. Neck supple. No edema present. No erythema present. No neck rigidity present.   Cardiovascular: Normal rate, regular rhythm, normal heart sounds and normal pulses.   Pulmonary/Chest: Effort normal and breath sounds normal. No respiratory distress. She has no decreased breath sounds. She has no rhonchi.   Abdominal: Normal appearance.   Musculoskeletal: Normal range of motion.         General: No deformity. Normal range of motion.   Neurological: She is alert and oriented to person, place, and time. She exhibits normal muscle tone. Coordination normal.   Skin: Skin is warm, dry, intact, not diaphoretic and not pale.   Psychiatric: Her speech is normal and behavior is normal. Judgment and thought content normal.   Nursing note and vitals reviewed.    Results for orders placed or performed in visit on 05/10/22   POCT COVID-19 Rapid Screening   Result Value Ref Range    POC Rapid COVID Positive (A) Negative     Acceptable Yes    POCT Influenza A/B MOLECULAR   Result Value Ref  Range    POC Molecular Influenza A Ag Negative Negative, Not Reported    POC Molecular Influenza B Ag Negative Negative, Not Reported     Acceptable Yes            Assessment:       1. COVID-19    2. Fever, unspecified fever cause          Plan:         Reviewed Positive Covid test with patient who verbalized understanding.  Patient informed that her symptoms are indicative of a viral illness which is treated symptomatically.  We discussed over the counter treatment for this condition. Patient educational handouts also included in discharge paperwork and given to patient who verbalized understanding and agrees with plan of care.  She denies any further questions or concerns at this time.  Patient exits exam room in no acute distress.    Discussed results with patient and proper quarantine based on CDC guidelines.   Discussed use of OTC medications for symptom control as this is a viral disease.   All ER precautions covered including but not limited to shortness of breath, intractable fever, or chest pain.  Discussed RTC if symptoms worsen, change, or persist.     Patient verbalized understanding and agreed with the plan.     Amada Bear PA-C        COVID-19    Fever, unspecified fever cause  -     POCT COVID-19 Rapid Screening  -     POCT Influenza A/B MOLECULAR         Patient Instructions   You have tested positive for COVID-19 today.      ISOLATION  If you tested positive and do not have symptoms, you must isolate for 5 days starting on the day of the positive test. I    If you tested positive and have symptoms, you must isolate for 5 days starting on the day of the first symptoms,  not the day of the positive test.     This is the most important part, both the CDC and the [facility] emphasize that you do not test out of isolation.     After 5 days, if your symptoms have improved and you have not had fever on day 5, you can return to the community on day 6- NO TESTING REQUIRED!      In fact, we do  not retest if you were positive in the last 90 days.    After your 5 days of isolation are completed, the CDC recommends strict mask use for the first 5 days that you come out of isolation.     Return to Urgent Care or go to ER if symptoms worsen or fail to improve.  Follow up with PCP as recommended for further management.     Your symptoms should begin to improve by Day 5 of the infection-- if symptoms worsen, you develop a new fever, shortness of breath, worsening shortness of breath with activity, chest pain, worsening cough, you must return to Urgent Care or go to the ER.    PLEASE READ YOUR DISCHARGE INSTRUCTIONS ENTIRELY AS IT CONTAINS IMPORTANT INFORMATION.      Please drink plenty of fluids.    Please get plenty of rest.    Please return here or go to the Emergency Department for any concerns or worsening of condition.      Tylenol or ibuprofen can also be used as directed for pain and fever unless you have an allergy to them or medical condition such as stomach ulcers, kidney or liver disease or blood thinners etc for which you should not be taking these type of medications.   YOU CAN ALTERNATE TYLENOL AND IBUPROFEN EVERY 3-4 HOURS. Take 1000mg (2 pills) of Extra Strength Acetaminophen (Tylenol) every 6 hours and 600mg (3 pills) of Ibuprofen (Motrin/Advil) every 6 hours, alternating the two so that every 3 hours you take one or the other. Start with the Tylenol, then 3 hours later take the Ibuprofen, then 3 hours later take the Tylenol again, and so on.         For your allergy symptoms and/or runny nose, sinus/ear pressure, congestion:        - You can take over-the-counter claritin, zyrtec, allegra, or xyzal as directed. These are antihistamines that can help with runny nose, nasal congestion, sneezing, and helps to dry up post-nasal drip, which usually causes sore throat and cough.               - If you do NOT have high blood pressure, you may use a decongestant form (D)  of this medication (ie.  Claritin- D, zyrtec-D, allegra-D) or if you do not take the D form, you can take sudafed (pseudoephedrine) over the counter, which is a decongestant. Do NOT take two decongestant (D) medications at the same time (such as mucinex-D and claritin-D or plain sudafed and claritin D). Dextromethorphan (DM) is a cough suppressant over the counter (ie. mucinex DM, robitussin, delsym; dayquil/nyquil has DM as well.)    -If you DO have high blood pressure, AVOID DECONGESTANTS (I.e., Phenylephrine and Pseudoephedrine). You may take Coricidin HBP for sinus congestion and Delsym for cough.        - You can take plain Mucinex (guaifenesin) 1200 mg twice a day to help loosen mucous.       Use over the counter flonase or nasocort: one spray each nostril twice daily OR two sprays each nostril once daily until nares dry out, unless you have Glaucoma.   If you find this dries your nose out or your nose bleeds, try using over the counter nasal saline a few minutes prior to using the flonase to moisten the lining of your nose and throughout the day as needed.   Flonase/nasal spray use directions:  1) Use once per day.  2) Blow nose first.  3) Close one nostril and spray flonase up the other nostril while inhaling gently.   4) If you inhale to aggressively, you will have a bitter taste in the back of your mouth.       You can try breathe right strips at night to help you breathe.  A cool mist humidifier in bedroom may help with cough and relieve stuffy nose.     Sinus rinses DO NOT USE TAP WATER, if you must, water must be a rolling boil for 1 minute, let it cool, then use.  May use distilled water, or over the counter nasal saline rinses.  Vics vapor rub in shower to help open nasal passages.  May use nasal gel to keep passages moisturized.  May use Nasal saline sprays during the day for added relief of congestion.   For those who go to the gym, please do not use the sauna or steam room now to clear sinuses.      Sore throat  recommendations: Warm fluids, warm salt water gargles, throat lozenges, tea, honey, soup, rest, hydration.      Cough     Honey with yahir to soothe your throat    Robitussin or Delsyum for cough suppressant for dry cough.    Mucinex DM or products containing Guaifenesin or Dextromethorphan for expectorant (wet cough).    Take prescription cough meds (pills) as prescribed; take prescription cough syrup at night as needed for cough.  Do not take both the prescribed cough pills and syrup at the same time or within 6 hours of each other.  Do not take the cough syrup with any other sedative medication as it can can cause drowsiness. Do not operate any heavy machinery, drink or drive while taking the cough syrup.    Try taking half a dose first of the cough syrup to see how it affects you.       Please follow up with your primary care doctor or specialist in the next 48-72hrs as needed and if no improvement    If you  smoke, please stop smoking.      Please return or see your primary care doctor if you develop new or worsening symptoms.     Please arrange follow up with your primary medical clinic as soon as possible. You must understand that you've received an Urgent Care treatment only and that you may be released before all of your medical problems are known or treated. You, the patient, will arrange for follow up as instructed. If your symptoms worsen or fail to improve you should go to the Emergency Room.

## 2022-12-01 ENCOUNTER — OFFICE VISIT (OUTPATIENT)
Dept: FAMILY MEDICINE | Facility: CLINIC | Age: 27
End: 2022-12-01
Payer: MEDICAID

## 2022-12-01 VITALS
SYSTOLIC BLOOD PRESSURE: 120 MMHG | BODY MASS INDEX: 34.2 KG/M2 | OXYGEN SATURATION: 98 % | HEIGHT: 65 IN | HEART RATE: 86 BPM | WEIGHT: 205.25 LBS | DIASTOLIC BLOOD PRESSURE: 86 MMHG | TEMPERATURE: 98 F

## 2022-12-01 DIAGNOSIS — L72.9 CYST OF SKIN: Primary | ICD-10-CM

## 2022-12-01 DIAGNOSIS — Z00.00 ROUTINE PHYSICAL EXAMINATION: ICD-10-CM

## 2022-12-01 PROCEDURE — 99999 PR PBB SHADOW E&M-EST. PATIENT-LVL III: CPT | Mod: PBBFAC,,, | Performed by: FAMILY MEDICINE

## 2022-12-01 PROCEDURE — 3008F BODY MASS INDEX DOCD: CPT | Mod: CPTII,,, | Performed by: FAMILY MEDICINE

## 2022-12-01 PROCEDURE — 3079F PR MOST RECENT DIASTOLIC BLOOD PRESSURE 80-89 MM HG: ICD-10-PCS | Mod: CPTII,,, | Performed by: FAMILY MEDICINE

## 2022-12-01 PROCEDURE — 99213 OFFICE O/P EST LOW 20 MIN: CPT | Mod: PBBFAC,PO | Performed by: FAMILY MEDICINE

## 2022-12-01 PROCEDURE — 99999 PR PBB SHADOW E&M-EST. PATIENT-LVL III: ICD-10-PCS | Mod: PBBFAC,,, | Performed by: FAMILY MEDICINE

## 2022-12-01 PROCEDURE — 99214 OFFICE O/P EST MOD 30 MIN: CPT | Mod: S$PBB,,, | Performed by: FAMILY MEDICINE

## 2022-12-01 PROCEDURE — 3079F DIAST BP 80-89 MM HG: CPT | Mod: CPTII,,, | Performed by: FAMILY MEDICINE

## 2022-12-01 PROCEDURE — 3074F SYST BP LT 130 MM HG: CPT | Mod: CPTII,,, | Performed by: FAMILY MEDICINE

## 2022-12-01 PROCEDURE — 99214 PR OFFICE/OUTPT VISIT, EST, LEVL IV, 30-39 MIN: ICD-10-PCS | Mod: S$PBB,,, | Performed by: FAMILY MEDICINE

## 2022-12-01 PROCEDURE — 3074F PR MOST RECENT SYSTOLIC BLOOD PRESSURE < 130 MM HG: ICD-10-PCS | Mod: CPTII,,, | Performed by: FAMILY MEDICINE

## 2022-12-01 PROCEDURE — 1159F MED LIST DOCD IN RCRD: CPT | Mod: CPTII,,, | Performed by: FAMILY MEDICINE

## 2022-12-01 PROCEDURE — 3008F PR BODY MASS INDEX (BMI) DOCUMENTED: ICD-10-PCS | Mod: CPTII,,, | Performed by: FAMILY MEDICINE

## 2022-12-01 PROCEDURE — 1159F PR MEDICATION LIST DOCUMENTED IN MEDICAL RECORD: ICD-10-PCS | Mod: CPTII,,, | Performed by: FAMILY MEDICINE

## 2022-12-01 RX ORDER — DROSPIRENONE AND ETHINYL ESTRADIOL 0.02-3(28)
1 KIT ORAL EVERY MORNING
COMMUNITY
Start: 2022-11-14 | End: 2023-10-26 | Stop reason: CLARIF

## 2022-12-01 NOTE — PROGRESS NOTES
[facility]  Progress Note    SUBJECTIVE:     Chief Complaint   Patient presents with    Recurrent Skin Infections    Cough       HPI   Michael Gonzalez  is a 27 y.o. female here for c/o a cyst on her R thigh which has been there for 6 months. Has seen dermatology which she was taking abx in the past when it was red. It is still expressing clear/yellowish fluid. No fevers, chills, redness currently.     Review of patient's allergies indicates:   Allergen Reactions    Pseudoephedrine hcl Rash and Shortness Of Breath       Past Medical History:   Diagnosis Date    Allergy     Anxiety     Gallstones      No past surgical history on file.  Family History   Problem Relation Age of Onset    No Known Problems Mother     Diabetes Father     Hypertension Father      Social History     Tobacco Use    Smoking status: Former     Types: Cigarettes    Smokeless tobacco: Never    Tobacco comments:     socially   Substance Use Topics    Alcohol use: No    Drug use: No        Review of Systems   Constitutional:  Negative for chills and fever.   Skin:         + cyst of R thigh   OBJECTIVE:     Vitals:    12/01/22 1320   BP: 120/86   Pulse: 86   Temp: 98 °F (36.7 °C)     Body mass index is 34.16 kg/m².    Physical Exam  Constitutional:       General: She is not in acute distress.     Appearance: Normal appearance. She is not diaphoretic.   HENT:      Head: Normocephalic and atraumatic.   Eyes:      Conjunctiva/sclera: Conjunctivae normal.   Pulmonary:      Effort: Pulmonary effort is normal.   Skin:     Comments: + cyst in R thigh area. No erythema, abscess. Slight serosangous discharge   Neurological:      Mental Status: She is alert and oriented to person, place, and time.       Old records were reviewed. Labs and/or images were independently reviewed.    ASSESSMENT     1. Cyst of skin    2. Routine physical examination        PLAN:     Cyst of skin  -     Ambulatory referral/consult to General Surgery; Future; Expected  date: 12/08/2022  -     not infected, oozing clear fluid. Refer to gen surg for removal.    Routine physical examination  -     CBC Auto Differential; Future  -     Comprehensive Metabolic Panel; Future  -     Hemoglobin A1C; Future  -     Lipid Panel; Future  -     TSH; Future  -     T4, Free; Future      RTC PRMIRNA Land MD  12/01/2022 1:34 PM

## 2023-04-26 NOTE — TELEPHONE ENCOUNTER
----- Message from Ladan Wyman sent at 11/26/2018 10:59 AM CST -----  Contact: pt            Name of Who is Calling: pt      What is the request in detail: pt returned the nurse's phone call. Call pt      Can the clinic reply by [facility]: no      What Number to Call Back if not in [facility]: [phone]                                     Vaccine status unknown

## 2023-09-15 ENCOUNTER — OFFICE VISIT (OUTPATIENT)
Dept: URGENT CARE | Facility: CLINIC | Age: 28
End: 2023-09-15
Payer: MEDICAID

## 2023-09-15 VITALS
HEIGHT: 65 IN | RESPIRATION RATE: 16 BRPM | SYSTOLIC BLOOD PRESSURE: 127 MMHG | DIASTOLIC BLOOD PRESSURE: 84 MMHG | TEMPERATURE: 98 F | WEIGHT: 205 LBS | HEART RATE: 107 BPM | OXYGEN SATURATION: 96 % | BODY MASS INDEX: 34.16 KG/M2

## 2023-09-15 DIAGNOSIS — M54.6 ACUTE MIDLINE THORACIC BACK PAIN: Primary | ICD-10-CM

## 2023-09-15 PROBLEM — N92.6 IRREGULAR MENSES: Status: ACTIVE | Noted: 2023-04-24

## 2023-09-15 PROBLEM — L68.0 HIRSUTISM: Status: ACTIVE | Noted: 2023-04-24

## 2023-09-15 PROBLEM — N83.209 RUPTURED OVARIAN CYST: Status: ACTIVE | Noted: 2023-05-26

## 2023-09-15 PROBLEM — E28.2 POLYCYSTIC OVARIAN SYNDROME: Status: ACTIVE | Noted: 2023-04-24

## 2023-09-15 PROBLEM — G43.909 MIGRAINE WITHOUT STATUS MIGRAINOSUS, NOT INTRACTABLE: Status: ACTIVE | Noted: 2023-04-24

## 2023-09-15 PROCEDURE — 99213 OFFICE O/P EST LOW 20 MIN: CPT | Mod: S$GLB,,,

## 2023-09-15 PROCEDURE — 99213 PR OFFICE/OUTPT VISIT, EST, LEVL III, 20-29 MIN: ICD-10-PCS | Mod: S$GLB,,,

## 2023-09-15 RX ORDER — PHENTERMINE HYDROCHLORIDE 15 MG/1
1 CAPSULE ORAL EVERY MORNING
COMMUNITY
End: 2023-10-26 | Stop reason: CLARIF

## 2023-09-15 RX ORDER — TOPIRAMATE SPINKLE 25 MG/1
25 CAPSULE ORAL 2 TIMES DAILY
COMMUNITY
End: 2023-10-26 | Stop reason: CLARIF

## 2023-09-15 RX ORDER — DROSPIRENONE AND ETHINYL ESTRADIOL 0.02-3(28)
1 KIT ORAL DAILY
COMMUNITY
Start: 2023-08-25

## 2023-09-15 RX ORDER — KETOROLAC TROMETHAMINE 30 MG/ML
15 INJECTION, SOLUTION INTRAMUSCULAR; INTRAVENOUS ONCE
Status: COMPLETED | OUTPATIENT
Start: 2023-09-15 | End: 2023-09-15

## 2023-09-15 RX ORDER — TIZANIDINE 2 MG/1
4 TABLET ORAL EVERY 8 HOURS PRN
Qty: 30 TABLET | Refills: 0 | Status: SHIPPED | OUTPATIENT
Start: 2023-09-15 | End: 2023-09-25

## 2023-09-15 RX ORDER — SPIRONOLACTONE 50 MG/1
50 TABLET, FILM COATED ORAL DAILY
COMMUNITY
End: 2023-10-26 | Stop reason: CLARIF

## 2023-09-15 RX ORDER — SPIRONOLACTONE 50 MG/1
1 TABLET, FILM COATED ORAL NIGHTLY
COMMUNITY
Start: 2023-08-25 | End: 2023-10-26 | Stop reason: CLARIF

## 2023-09-15 RX ORDER — TOPIRAMATE 25 MG/1
TABLET ORAL
COMMUNITY
Start: 2023-08-25 | End: 2023-10-26 | Stop reason: CLARIF

## 2023-09-15 RX ORDER — PHENTERMINE HYDROCHLORIDE 15 MG/1
15 CAPSULE ORAL EVERY MORNING
COMMUNITY
End: 2023-10-26 | Stop reason: CLARIF

## 2023-09-15 RX ADMIN — KETOROLAC TROMETHAMINE 15 MG: 30 INJECTION, SOLUTION INTRAMUSCULAR; INTRAVENOUS at 01:09

## 2023-09-15 NOTE — PROGRESS NOTES
"Subjective:     Patient ID: Michael Gonzalez is a 27 y.o. female.    Vitals:  height is 5' 5" (1.651 m) and weight is 93 kg (205 lb). Her oral temperature is 98.4 °F (36.9 °C). Her blood pressure is 127/84 and her pulse is 107. Her respiration is 16 and oxygen saturation is 96%.     Chief Complaint: Back Pain    Patient presents with mid back pain.  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  Patient states that she is now having some mid back pain.  She states that she is taken Tylenol and ibuprofen for his symptoms with no relief.  She states that pain is worse with turning, bending, coughing.  She denies numbness, tingling, chest pain, SOB, bowel/bladder incontinence.  Patient denies any injury/trauma to the affected area.    Back Pain  This is a new problem. The current episode started yesterday. The problem occurs daily. The problem has been gradually worsening since onset. The pain is present in the thoracic spine. The quality of the pain is described as aching. The pain is at a severity of 8/10. The pain is mild. The symptoms are aggravated by coughing, position, bending and twisting. Stiffness is present All day. Pertinent negatives include no abdominal pain, bladder incontinence, bowel incontinence, chest pain, dysuria, fever, headaches, leg pain, numbness, paresis, paresthesias, pelvic pain, perianal numbness, tingling, weakness or weight loss. She has tried NSAIDs (tylenol) for the symptoms. The treatment provided no relief.       Constitution: Negative for chills and fever.   Cardiovascular:  Negative for chest pain and sob on exertion.   Respiratory:  Negative for shortness of breath.    Gastrointestinal:  Negative for abdominal pain, nausea, vomiting, diarrhea and bowel incontinence.   Genitourinary:  Negative for dysuria, bladder incontinence and pelvic pain.   Musculoskeletal:  Positive for pain, back " pain, muscle cramps and muscle ache. Negative for trauma, joint pain, joint swelling and abnormal ROM of joint.   Skin:  Negative for rash, erythema and bruising.   Neurological:  Negative for headaches and numbness.     Objective:     Physical Exam   Constitutional:  Non-toxic appearance. She does not appear ill. No distress.      Comments:Patient is in no acute distress, patient is non-toxic appearing, patient is ox3, patient is answering question appropriately.   normal  Cardiovascular: Normal rate, regular rhythm and normal heart sounds.   No murmur heard.Exam reveals no gallop and no friction rub.   Pulmonary/Chest: Effort normal and breath sounds normal. No stridor. No respiratory distress. She has no wheezes. She has no rhonchi. She has no rales. She exhibits no tenderness.         Comments: Patient is in no respiratory distress. Breath sounds even, unlabored, and clear to auscultation bilaterally. No accessory muscle usage. Patient able to speak in complete sentences with ease.    Abdominal: Normal appearance.   Musculoskeletal:      Thoracic back: She exhibits tenderness. She exhibits normal range of motion, no swelling, no edema, no deformity, no laceration and no spasm.      Lumbar back: Normal.        Back:       Comments: Red represents area where patient states her pain is. Pain is not reproducible with palpation, but pain is reproducible with movement. 5/5 strength of LE. Full ROM. Sensation intact. Patellar DTR , 2+ bilaterally. There is no midline/paraspinal tenderness. No step off deformities. Patient is able to ambulate in exam room and throughout clinic.   Neurological: She is alert.   Skin: Skin is not diaphoretic. No erythema   Nursing note and vitals reviewed.    Assessment:     1. Acute midline thoracic back pain      Plan:   Previous notes reviewed.  Vital signs reviewed.  Discussed midline thoracic back pain, home care, tx options, and given follow up precautions.  Patient was briefed on my  thought process and diagnosis.   Patient involved with the treatment plan and agreed to the plan.  Patient informed on warning signs, patient understood warning signs and to go to urgent care or ER if warning signs appear.  Please excuse grammatical/spelling errors appreciated throughout this visit encounter for a remote dictation device was used during this encounter.    Patient Instructions   Please drink plenty of fluids.  Please get plenty of rest.    Please return here or go to the Emergency Department for any concerns or worsening of condition.    You were given a TORADOL injection today, please do not take anymore anti-inflammatory medications today. Please start taking anti-inflammatory medications tomorrow.     Please take TIZANIDINE every 8 hours as needed for muscle tension, be aware that this medication can make you drowsy. Do not drive or operate heavy machinery while taking.     Please consider using over the counter VOLTAREN GEL for pain relief.   Please consider using over the counter LIDOCAINE PATCHES for pain relief.   Please consider applying a heating pad to the affected area.    Rest, ice, compression and elevation to the affected joint or limb as needed.  Please follow up with your primary care doctor or specialist as needed.    If you  smoke, please stop smoking.    Acute midline thoracic back pain  -     ketorolac injection 15 mg  -     tiZANidine (ZANAFLEX) 2 MG tablet; Take 2 tablets (4 mg total) by mouth every 8 (eight) hours as needed (muscle tension, muscle spasms, muscle pain).  Dispense: 30 tablet; Refill: 0      Aleyda Gotti PA-C

## 2023-09-21 ENCOUNTER — OFFICE VISIT (OUTPATIENT)
Dept: FAMILY MEDICINE | Facility: CLINIC | Age: 28
End: 2023-09-21
Payer: MEDICAID

## 2023-09-21 VITALS
DIASTOLIC BLOOD PRESSURE: 80 MMHG | WEIGHT: 183.06 LBS | SYSTOLIC BLOOD PRESSURE: 112 MMHG | BODY MASS INDEX: 30.5 KG/M2 | HEART RATE: 100 BPM | HEIGHT: 65 IN | TEMPERATURE: 99 F | OXYGEN SATURATION: 98 %

## 2023-09-21 DIAGNOSIS — R22.1 NECK MASS: Primary | ICD-10-CM

## 2023-09-21 PROCEDURE — 3008F BODY MASS INDEX DOCD: CPT | Mod: CPTII,,, | Performed by: FAMILY MEDICINE

## 2023-09-21 PROCEDURE — 1159F PR MEDICATION LIST DOCUMENTED IN MEDICAL RECORD: ICD-10-PCS | Mod: CPTII,,, | Performed by: FAMILY MEDICINE

## 2023-09-21 PROCEDURE — 3074F PR MOST RECENT SYSTOLIC BLOOD PRESSURE < 130 MM HG: ICD-10-PCS | Mod: CPTII,,, | Performed by: FAMILY MEDICINE

## 2023-09-21 PROCEDURE — 3074F SYST BP LT 130 MM HG: CPT | Mod: CPTII,,, | Performed by: FAMILY MEDICINE

## 2023-09-21 PROCEDURE — 99214 PR OFFICE/OUTPT VISIT, EST, LEVL IV, 30-39 MIN: ICD-10-PCS | Mod: S$PBB,,, | Performed by: FAMILY MEDICINE

## 2023-09-21 PROCEDURE — 99213 OFFICE O/P EST LOW 20 MIN: CPT | Mod: PBBFAC,PO | Performed by: FAMILY MEDICINE

## 2023-09-21 PROCEDURE — 3008F PR BODY MASS INDEX (BMI) DOCUMENTED: ICD-10-PCS | Mod: CPTII,,, | Performed by: FAMILY MEDICINE

## 2023-09-21 PROCEDURE — 99999 PR PBB SHADOW E&M-EST. PATIENT-LVL III: CPT | Mod: PBBFAC,,, | Performed by: FAMILY MEDICINE

## 2023-09-21 PROCEDURE — 99214 OFFICE O/P EST MOD 30 MIN: CPT | Mod: S$PBB,,, | Performed by: FAMILY MEDICINE

## 2023-09-21 PROCEDURE — 1159F MED LIST DOCD IN RCRD: CPT | Mod: CPTII,,, | Performed by: FAMILY MEDICINE

## 2023-09-21 PROCEDURE — 3079F DIAST BP 80-89 MM HG: CPT | Mod: CPTII,,, | Performed by: FAMILY MEDICINE

## 2023-09-21 PROCEDURE — 99999 PR PBB SHADOW E&M-EST. PATIENT-LVL III: ICD-10-PCS | Mod: PBBFAC,,, | Performed by: FAMILY MEDICINE

## 2023-09-21 PROCEDURE — 3079F PR MOST RECENT DIASTOLIC BLOOD PRESSURE 80-89 MM HG: ICD-10-PCS | Mod: CPTII,,, | Performed by: FAMILY MEDICINE

## 2023-09-21 NOTE — PROGRESS NOTES
[facility]  Progress Note    SUBJECTIVE:     Chief Complaint   Patient presents with    nodule throat       HPI   Michael Gonzalez  is a 27 y.o. female here for c/o R neck mass. Has been going on for past couple months. Seems like it is getting bigger. Non-tender. No other symptoms. Not tender to touch.    Review of patient's allergies indicates:   Allergen Reactions    Pseudoephedrine hcl Rash and Shortness Of Breath       Past Medical History:   Diagnosis Date    Allergy     Anxiety     Gallstones     PCOS (polycystic ovarian syndrome)      History reviewed. No pertinent surgical history.  Family History   Problem Relation Age of Onset    No Known Problems Mother     Diabetes Father     Hypertension Father      Social History     Tobacco Use    Smoking status: Former     Types: Cigarettes    Smokeless tobacco: Never    Tobacco comments:     socially   Substance Use Topics    Alcohol use: No    Drug use: No        Review of Systems   Constitutional:  Negative for chills, fever and weight loss.   HENT:          + R neck mass     OBJECTIVE:     Vitals:    09/21/23 1255   BP: 112/80   Pulse: 100   Temp: 99 °F (37.2 °C)     Body mass index is 30.47 kg/m².    Physical Exam  Constitutional:       General: She is not in acute distress.     Appearance: Normal appearance. She is not diaphoretic.   HENT:      Head: Normocephalic and atraumatic.   Eyes:      Conjunctiva/sclera: Conjunctivae normal.   Neck:      Comments: + R neck mass, mobile. Nontender. Size about 3 cm. More superficial than deep.  Pulmonary:      Effort: Pulmonary effort is normal.   Neurological:      Mental Status: She is alert and oriented to person, place, and time.         Old records were reviewed. Labs and/or images were independently reviewed.    ASSESSMENT     1. Neck mass        PLAN:     Neck mass  -     CT Soft Tissue Neck W WO Contrast; Future; Expected date: 09/21/2023  -     COMPREHENSIVE METABOLIC PANEL; Future; Expected date:  09/21/2023  -     order CT neck to for better visualization of R neck mass.      RTC PRN    Christos Land MD  09/21/2023 1:22 PM

## 2023-09-24 DIAGNOSIS — M54.6 ACUTE MIDLINE THORACIC BACK PAIN: ICD-10-CM

## 2023-09-25 RX ORDER — TIZANIDINE 2 MG/1
TABLET ORAL
Qty: 30 TABLET | Refills: 0 | Status: SHIPPED | OUTPATIENT
Start: 2023-09-25 | End: 2023-10-26 | Stop reason: CLARIF

## 2023-09-29 ENCOUNTER — PATIENT MESSAGE (OUTPATIENT)
Dept: OTOLARYNGOLOGY | Facility: CLINIC | Age: 28
End: 2023-09-29
Payer: MEDICAID

## 2023-09-29 ENCOUNTER — HOSPITAL ENCOUNTER (OUTPATIENT)
Dept: RADIOLOGY | Facility: HOSPITAL | Age: 28
Discharge: HOME | End: 2023-09-29
Attending: FAMILY MEDICINE
Payer: MEDICAID

## 2023-09-29 ENCOUNTER — TELEPHONE (OUTPATIENT)
Dept: OTOLARYNGOLOGY | Facility: CLINIC | Age: 28
End: 2023-09-29
Payer: MEDICAID

## 2023-09-29 DIAGNOSIS — E04.1 THYROID NODULE: Primary | ICD-10-CM

## 2023-09-29 DIAGNOSIS — R22.1 NECK MASS: ICD-10-CM

## 2023-09-29 DIAGNOSIS — R59.0 CERVICAL ADENOPATHY: Primary | ICD-10-CM

## 2023-09-29 PROCEDURE — 25500020 PHARM REV CODE 255: Performed by: FAMILY MEDICINE

## 2023-09-29 PROCEDURE — 70491 CT SOFT TISSUE NECK WITH CONTRAST: ICD-10-PCS | Mod: 26,,, | Performed by: RADIOLOGY

## 2023-09-29 PROCEDURE — 70491 CT SOFT TISSUE NECK W/DYE: CPT | Mod: 26,,, | Performed by: RADIOLOGY

## 2023-09-29 PROCEDURE — 70491 CT SOFT TISSUE NECK W/DYE: CPT | Mod: TC

## 2023-09-29 RX ADMIN — IOHEXOL 85 ML: 350 INJECTION, SOLUTION INTRAVENOUS at 01:09

## 2023-09-29 NOTE — TELEPHONE ENCOUNTER
----- Message from Brittany Salter sent at 9/29/2023  4:07 PM CDT -----  Regarding: [id number]  Type: Patient Call Back    Who called:self     What is the request in detail: pt states she will like a call from the nurse to schedule, she states she has to get a biopsy     Can the clinic reply by [facility]?mo     Would the patient rather a call back or a response via [facility]? Call back     Best call back number:[phone]    Additional Information:

## 2023-09-29 NOTE — TELEPHONE ENCOUNTER
Alyssa is a 24 year old year old female here for an OB check.  She is      .  Gestational Age: 36w4d.  Concerns today include: back pain, pressure    She reports fetal movement.  She denies bleeding.  She reports cramping.  She reports nausea.  She reports breast tenderness.      Pediatrician verified yes    Allergy or Sensitivity to PCN? no  Patient was advised of today's exam.  GBS swab was set out for doctor.   If your visit includes an exam today, would you like an assistant to be present in the room during that time?no    [employer], my journey  application verified/signed up YES.   Pt informed that [employer] is loaded with  provider- approved content, customized resources, and weekly tasks and tips     Spoke with patient and explained to her that dr peterson would not being doing the biopsy when she sees her. That the pcp placed an  order for the IR dept to get that done. Would prefer to have that done prior to appt if can. Sent IR department a message.

## 2023-10-02 ENCOUNTER — TELEPHONE (OUTPATIENT)
Dept: OTOLARYNGOLOGY | Facility: CLINIC | Age: 28
End: 2023-10-02
Payer: MEDICAID

## 2023-10-02 NOTE — TELEPHONE ENCOUNTER
----- Message from Maribell Decker MA sent at 10/2/2023  9:25 AM CDT -----  Type: Patient Call Back    Who called:Self    What is the request in detail: is asking for Freda to give her a call regarding her appt.    Can the clinic reply by [facility]?NO    Would the patient rather a call back or a response via [facility]? Yes, call     Best call back number: [phone] (home)

## 2023-10-04 ENCOUNTER — HOSPITAL ENCOUNTER (OUTPATIENT)
Dept: RADIOLOGY | Facility: HOSPITAL | Age: 28
Discharge: HOME | End: 2023-10-04
Attending: OTOLARYNGOLOGY
Payer: MEDICAID

## 2023-10-04 DIAGNOSIS — R22.1 NECK MASS: ICD-10-CM

## 2023-10-04 DIAGNOSIS — E04.1 THYROID NODULE: ICD-10-CM

## 2023-10-04 PROCEDURE — 76536 US EXAM OF HEAD AND NECK: CPT | Mod: 26,,, | Performed by: RADIOLOGY

## 2023-10-04 PROCEDURE — 76536 US EXAM OF HEAD AND NECK: CPT | Mod: TC

## 2023-10-04 PROCEDURE — 76536 US SOFT TISSUE HEAD NECK THYROID: ICD-10-PCS | Mod: 26,,, | Performed by: RADIOLOGY

## 2023-10-06 ENCOUNTER — OFFICE VISIT (OUTPATIENT)
Dept: OTOLARYNGOLOGY | Facility: CLINIC | Age: 28
End: 2023-10-06
Payer: MEDICAID

## 2023-10-06 VITALS — WEIGHT: 183 LBS | BODY MASS INDEX: 30.49 KG/M2 | HEIGHT: 65 IN

## 2023-10-06 DIAGNOSIS — E07.9 THYROID MASS: Primary | ICD-10-CM

## 2023-10-06 DIAGNOSIS — J34.3 HYPERTROPHY OF INFERIOR NASAL TURBINATE: ICD-10-CM

## 2023-10-06 DIAGNOSIS — R49.0 DYSPHONIA: ICD-10-CM

## 2023-10-06 DIAGNOSIS — R59.0 CERVICAL ADENOPATHY: ICD-10-CM

## 2023-10-06 PROCEDURE — 3008F BODY MASS INDEX DOCD: CPT | Mod: CPTII,S$GLB,, | Performed by: OTOLARYNGOLOGY

## 2023-10-06 PROCEDURE — 31575 PR LARYNGOSCOPY, FLEXIBLE; DIAGNOSTIC: ICD-10-PCS | Mod: S$GLB,,, | Performed by: OTOLARYNGOLOGY

## 2023-10-06 PROCEDURE — 31575 DIAGNOSTIC LARYNGOSCOPY: CPT | Mod: S$GLB,,, | Performed by: OTOLARYNGOLOGY

## 2023-10-06 PROCEDURE — 99204 OFFICE O/P NEW MOD 45 MIN: CPT | Mod: 25,S$GLB,, | Performed by: OTOLARYNGOLOGY

## 2023-10-06 PROCEDURE — 3008F PR BODY MASS INDEX (BMI) DOCUMENTED: ICD-10-PCS | Mod: CPTII,S$GLB,, | Performed by: OTOLARYNGOLOGY

## 2023-10-06 PROCEDURE — 99204 PR OFFICE/OUTPT VISIT, NEW, LEVL IV, 45-59 MIN: ICD-10-PCS | Mod: 25,S$GLB,, | Performed by: OTOLARYNGOLOGY

## 2023-10-06 RX ORDER — AZELASTINE 1 MG/ML
1 SPRAY, METERED NASAL 2 TIMES DAILY
Qty: 30 ML | Refills: 3 | Status: SHIPPED | OUTPATIENT
Start: 2023-10-06

## 2023-10-06 RX ORDER — FLUTICASONE PROPIONATE 50 MCG
2 SPRAY, SUSPENSION (ML) NASAL 2 TIMES DAILY
Qty: 18.2 ML | Refills: 3 | Status: SHIPPED | OUTPATIENT
Start: 2023-10-06

## 2023-10-06 NOTE — PROGRESS NOTES
OTOLARYNGOLOGY CLINIC NOTE  Date:  10/06/2023     Chief complaint:  Chief Complaint   Patient presents with    Thyroid Problem     Abnormal us       History of Present Illness  Michael Gonzalez is a 27 y.o. female  presenting today for a new evaluation and treatment of neck mass    Enlargement of neck anteriorly and that has been there for about 6 months . Also noticed a mass in the right neck that came up as well no issues swallowing. Has been hoarse for a week and half but normal voice prior to that.     Past Medical History  Past Medical History:   Diagnosis Date    Allergy     Anxiety     Gallstones     PCOS (polycystic ovarian syndrome)         Past Surgical History  No past surgical history on file.     Medications  Current Outpatient Medications on File Prior to Visit   Medication Sig Dispense Refill    drospirenone-ethinyl estradioL (YOLY) 3-0.02 mg per tablet Take 1 tablet by mouth every morning.      drospirenone-ethinyl estradioL (YOLY) 3-0.02 mg per tablet Take 1 tablet by mouth once daily.      fluticasone propionate (FLONASE) 50 mcg/actuation nasal spray 2 sprays (100 mcg total) by Each Nostril route once daily. 1 Bottle 2    levonorgestreL (MIRENA) 20 mcg/24 hours (6 yrs) 52 mg IUD 1 each by Intrauterine route once.      phentermine 15 MG capsule Take 15 mg by mouth every morning.      phentermine 15 MG capsule Take 1 capsule by mouth every morning.      spironolactone (ALDACTONE) 50 MG tablet Take 50 mg by mouth once daily.      spironolactone (ALDACTONE) 50 MG tablet Take 1 tablet by mouth every evening.      tiZANidine (ZANAFLEX) 2 MG tablet TAKE 2 TABLETS(4 MG) BY MOUTH EVERY 8 HOURS AS NEEDED FOR MUSCLE TENSION OR MUSCLE SPASMS OR MUSCLE PAIN 30 tablet 0    topiramate (TOPAMAX) 25 MG tablet Take by mouth.      topiramate 25 mg capsule Take 25 mg by mouth 2 (two) times daily.       No current facility-administered medications on file prior to visit.       Review of Systems  Review of Systems  "  Constitutional:  Positive for malaise/fatigue.   HENT: Negative.     Respiratory: Negative.     Cardiovascular: Negative.    Gastrointestinal:  Positive for diarrhea and heartburn.   Genitourinary: Negative.    Musculoskeletal:  Positive for back pain.   Skin: Negative.    Neurological:  Positive for dizziness, tremors and headaches.   Psychiatric/Behavioral: Negative.      Answers submitted by the patient for this visit:  Review of Symptoms Questionnaire  (Submitted on 10/4/2023)  Seasonal Allergies?: Yes  None of these : Yes  Light-headedness: Yes  None of these: Yes    Social History   reports that she has quit smoking. Her smoking use included cigarettes. She has never used smokeless tobacco. She reports that she does not drink alcohol and does not use drugs.     Family History  Family History   Problem Relation Age of Onset    No Known Problems Mother     Diabetes Father     Hypertension Father         Physical Exam   There were no vitals filed for this visit. Body mass index is 30.45 kg/m².  Weight: 83 kg (183 lb)   Height: 5' 5" (165.1 cm)     GENERAL: no acute distress.  HEAD: normocephalic.   EYES: lids and lashes normal. No scleral icterus  EARS: external ear without lesion, normal pinna shape and position.    NOSE: external nose without significant bony abnormality  ORAL CAVITY/OROPHARYNX: tongue midline and mobile. Symmetric palate rise. Uvula midline.   NECK: trachea midline. +thyromegaly  LYMPH NODES:+ bilateral adenopathy  RESPIRATORY: no stridor, no stertor. Voice relatively normal, some raspiness at times. Respirations nonlabored.  NEURO: alert, responds to questions appropriately.   PSYCH:mood appropriate   Flex normal    PROCEDURE NOTE  NAME OF PROCEDURE: Flexible Laryngoscopy, diagnostic  INDICATIONS: gag reflex precludes mirror exam,dysphonia   FINDINGS: normal motion, no lesion ; turbinate hypertrophy    Consent: After procedure was explained in detail and all questions answered, verbal " consent was obtained for performing flexible laryngoscopy.  Anesthesia: topical 4% lidocaine and neosynephrine  Procedure: With patient in seated position, the scope was inserted into the bilateral nasal passageway and advanced atraumatically into the nasopharynx to examine the following structures:  Nasal cavity: Turbinates withsignificant hypertrophy. no middle meatal edema. No purulent drainage.   Nasopharynx: no mass or lesion noted in nasopharynx.   Oropharynx: base of tongue without  mass or ulceration. Lingual tonsils normal in appearance  Hypopharynx: posterior pharyngeal wall without mass or lesion. No pooling of secretions. Pyriform sinuses visible without mass or lesion  Larynx: epiglottis normal without lesion. False vocal folds without edema/erythema/lesion. True vocal folds mobile and without lesion. no interarytenoid edema no erythema . Postcricoid region without edema no lesion   Subglottis: visualized portion of subglottis normal in appearance    After examination performed, the scope was removed atraumatically . The patient tolerated the procedure well. Photodocumentation obtained , all images and/or videos uploaded in media section of epic.    Imaging:  The patient does have any pertinent and/or recent imaging of the head and neck.   Ct neck images and report personally reviewed. + thyroid mass and multiple bilateral nodes concerning for metastatic papillary thyroid cancer  Labs:  CBC  Recent Labs   Lab 05/26/23  0638   WBC 10-20 A     BMP  Recent Labs   Lab 10/14/20  0529 05/26/23  0623 09/29/23  1156   Glucose  --   --  102   Sodium 142 138 139   Potassium 4.0 4.0 3.8   Chloride  --   --  108   CO2  --   --  24   Carbon Dioxide 27 24  --    BUN 7.0 14.4 7   Creatinine 0.52 L 0.81 0.8   Calcium 8.6 9.5 9.6     COAGS        Assessment  1. Cervical adenopathy  - Ambulatory referral/consult to ENT  - TSH; Future  - THYROGLOBULIN; Future  - T4, FREE; Future    2. Thyroid mass  - TSH; Future  -  THYROGLOBULIN; Future  - T4, FREE; Future    3. Hypertrophy of inferior nasal turbinate    4. Dysphonia       Plan:  Discussed plan of care with patient in detail and all questions answered. Patient reported understanding of plan of care. I gave the patient the opportunity to ask questions and patient confirmed all questions answered to satisfaction.     Saline flonase and astelin bid  Discussed imaging and suspicion of papillary thyroid cancer. Would like FNA of thyroid and left and right cervical nodes for surgical planning   Tentatively schedule for 10-31-23 surgery will have her come back prior to discuss further about surgical plan etc.         Please be aware that this note has been generated with the assistance of odal voice-to-text.  Please excuse any spelling or grammatical errors.

## 2023-10-06 NOTE — PATIENT INSTRUCTIONS
"Information and instructions from your visit with me today:    Start using the following medication nasal sprays:   Fluticasone spray:    This medication is a steroid spray. It stays within the nose and does not have absorption into the body that leads to side effects that one has with oral steroid medication. Fluticasone nasal spray is the same as the Flonase brand nasal spray. Discuss with your pharmacist if the price is lower over the counter or with a prescription ( this varies depending on insurance). The medication that is over the counter is the same as the prescription medication. Use this medication as instructed on the prescription, 1-2 sprays on each side of your nose twice daily.     Azelastine  spray:  This medication is an antihistamine used to treat nasal symptoms of allergy, which works specifically in the nose unlike antihistamine pills which have more of an effect on the whole body. Use this medication as instructed on the prescription, 1 spray on each side of your nose twice daily.     Additional instructions for medication sprays  Place the tip of the medication bottle in your nose and aim slightly up and out on each side to get medication high and deep into your nose and sinuses, and not have it all deposit in the very front of your nose. Aim the tip of the nozzle towards the outer corner of your eye . You can imagine aiming towards the back of your eyeball on each side for this, as opposed to straight back to the center of your nose and head.     You need to use this medication every day regardless of symptoms, as it takes time ( a few weeks) to work and get the benefits. It does not work on an "as needed" basis like taking a decongestant. If your symptoms only occur in a particular season, then the medication can be used seasonally instead of year long. For seasonal symptoms, you should start using the spray twice daily a month before when you normally have symptoms ( for example, if symptoms " start in August, should start at the end of June).     Start nasal irrigations with saline solution- you can either use a rinse or a mist spray:          NASAL SALINE SPRAY ( simply saline and arm and hammer are examples) There are several different brands found in the cold and flu aisle of the pharmacy. You can use any brand of saline spray - this will deliver the saline by a gentle mist ( if you have difficulty or discomfort with nasal rinse/ a lot of fluid in the nose, this will be more comfortable).       Always rinse your nose with saline prior to using medication sprays and wait a couple of hours before using again. You can use the saline throughout the day to help with stuffy nose or dry nose.    Do not use nasal decongestant sprays such as Afrin or similar products long term ( over 3 days) .  This can cause long term physical nasal addiction. Afrin should only be used if having nose bleeds, severe nasal congestion , or severe ear pain/fullness and should not be used for more than 2-3 days in a row . It is a not a medication that should be used for a long period of time.     It was nice meeting you today, and I look forward to helping you feel better soon. Please don't hesitate to call if you have any other questions or concerns, or if I can be of any assistance in the meantime.      Daphne Vazquez MD    [facility]     Phone  [phone]    Fax      [phone]        Daphne Vazquez MD  Otorhinolaryngology

## 2023-10-09 ENCOUNTER — LAB VISIT (OUTPATIENT)
Dept: LAB | Facility: HOSPITAL | Age: 28
End: 2023-10-09
Attending: OTOLARYNGOLOGY
Payer: MEDICAID

## 2023-10-09 ENCOUNTER — TELEPHONE (OUTPATIENT)
Dept: OTOLARYNGOLOGY | Facility: CLINIC | Age: 28
End: 2023-10-09
Payer: MEDICAID

## 2023-10-09 DIAGNOSIS — R59.0 CERVICAL ADENOPATHY: ICD-10-CM

## 2023-10-09 DIAGNOSIS — E07.9 THYROID MASS: ICD-10-CM

## 2023-10-09 LAB
T4 FREE SERPL-MCNC: 1.12 NG/DL (ref 0.71–1.51)
TSH SERPL DL<=0.005 MIU/L-ACNC: 2.44 UIU/ML (ref 0.4–4)

## 2023-10-09 PROCEDURE — 84443 ASSAY THYROID STIM HORMONE: CPT | Performed by: OTOLARYNGOLOGY

## 2023-10-09 PROCEDURE — 36415 COLL VENOUS BLD VENIPUNCTURE: CPT | Performed by: OTOLARYNGOLOGY

## 2023-10-09 PROCEDURE — 84439 ASSAY OF FREE THYROXINE: CPT | Performed by: OTOLARYNGOLOGY

## 2023-10-09 PROCEDURE — 84432 ASSAY OF THYROGLOBULIN: CPT | Performed by: OTOLARYNGOLOGY

## 2023-10-09 NOTE — TELEPHONE ENCOUNTER
Spoke with patient an explained to her that we are still waiting to hear back from the IR Dept for FNA to be scheduled. If they cant schedule it soon them we will do it in the office.

## 2023-10-09 NOTE — TELEPHONE ENCOUNTER
----- Message from Maribell Decker MA sent at 10/9/2023 12:06 PM CDT -----  Type: Patient Call Back    Who called: self    What is the request in detail: pt. Is asking for DYLAN Green to give her a call regarding a biopsy being scheduled ..    Can the clinic reply by [facility]?No    Would the patient rather a call back or a response via [facility]? Yes, call    Best call back number: [phone]

## 2023-10-11 LAB
THRYOGLOBULIN INTERPRETATION: ABNORMAL
THYROGLOB AB SERPL-ACNC: <1.8 IU/ML
THYROGLOB SERPL-MCNC: 139 NG/ML

## 2023-10-17 ENCOUNTER — TELEPHONE (OUTPATIENT)
Dept: INTERVENTIONAL RADIOLOGY/VASCULAR | Facility: HOSPITAL | Age: 28
End: 2023-10-17
Payer: MEDICAID

## 2023-10-17 NOTE — NURSING
Advised to arrive at 13:30, where to check in, no need to fast, may drive self, take meds as usual, and bring current list of home medications. Confirmed one allergy on file and no blood thinners.

## 2023-10-18 ENCOUNTER — HOSPITAL ENCOUNTER (OUTPATIENT)
Dept: INTERVENTIONAL RADIOLOGY/VASCULAR | Facility: HOSPITAL | Age: 28
Discharge: HOME | End: 2023-10-18
Attending: OTOLARYNGOLOGY
Payer: MEDICAID

## 2023-10-18 VITALS
DIASTOLIC BLOOD PRESSURE: 69 MMHG | TEMPERATURE: 98 F | WEIGHT: 180 LBS | BODY MASS INDEX: 29.99 KG/M2 | HEIGHT: 65 IN | HEART RATE: 92 BPM | OXYGEN SATURATION: 100 % | SYSTOLIC BLOOD PRESSURE: 119 MMHG | RESPIRATION RATE: 18 BRPM

## 2023-10-18 DIAGNOSIS — E04.1 THYROID NODULE: ICD-10-CM

## 2023-10-18 DIAGNOSIS — R22.1 NECK MASS: ICD-10-CM

## 2023-10-18 DIAGNOSIS — R59.1 LYMPHADENOPATHY: Primary | ICD-10-CM

## 2023-10-18 PROCEDURE — 88172 PR  EVALUATION OF FNA SMEAR TO DETERMINE ADEQUACY, FIRST EVAL: ICD-10-PCS | Mod: 26,,, | Performed by: PATHOLOGY

## 2023-10-18 PROCEDURE — 88173 PR  INTERPRETATION OF FNA SMEAR: ICD-10-PCS | Mod: 26,,, | Performed by: PATHOLOGY

## 2023-10-18 PROCEDURE — 25000003 PHARM REV CODE 250: Performed by: RADIOLOGY

## 2023-10-18 PROCEDURE — 88342 IMHCHEM/IMCYTCHM 1ST ANTB: CPT | Mod: 59 | Performed by: PATHOLOGY

## 2023-10-18 PROCEDURE — 10005 FNA BX W/US GDN 1ST LES: CPT | Performed by: RADIOLOGY

## 2023-10-18 PROCEDURE — 88342 CHG IMMUNOCYTOCHEMISTRY: ICD-10-PCS | Mod: 26,,, | Performed by: PATHOLOGY

## 2023-10-18 PROCEDURE — 88333 PATH CONSLTJ SURG CYTO XM 1: CPT | Mod: 59 | Performed by: PATHOLOGY

## 2023-10-18 PROCEDURE — 88172 CYTP DX EVAL FNA 1ST EA SITE: CPT | Mod: 26,,, | Performed by: PATHOLOGY

## 2023-10-18 PROCEDURE — 27200940 IR US FINE NEEDLE ASPIRATION BIOPSY, FIRST LESION

## 2023-10-18 PROCEDURE — 88189 FLOWCYTOMETRY/READ 16 & >: CPT | Mod: ,,, | Performed by: PATHOLOGY

## 2023-10-18 PROCEDURE — 88185 FLOWCYTOMETRY/TC ADD-ON: CPT | Performed by: PATHOLOGY

## 2023-10-18 PROCEDURE — 88173 CYTOPATH EVAL FNA REPORT: CPT | Performed by: PATHOLOGY

## 2023-10-18 PROCEDURE — 88342 IMHCHEM/IMCYTCHM 1ST ANTB: CPT | Mod: 26,,, | Performed by: PATHOLOGY

## 2023-10-18 PROCEDURE — 10005 IR US FINE NEEDLE ASPIRATION BIOPSY, FIRST LESION: ICD-10-PCS | Mod: ,,, | Performed by: PHYSICIAN ASSISTANT

## 2023-10-18 PROCEDURE — 88184 FLOWCYTOMETRY/ TC 1 MARKER: CPT | Performed by: PATHOLOGY

## 2023-10-18 PROCEDURE — 88307 TISSUE EXAM BY PATHOLOGIST: CPT | Performed by: PATHOLOGY

## 2023-10-18 PROCEDURE — 88177 CYTP FNA EVAL EA ADDL: CPT | Performed by: PATHOLOGY

## 2023-10-18 PROCEDURE — 88305 TISSUE EXAM BY PATHOLOGIST: CPT | Mod: 26,,, | Performed by: PATHOLOGY

## 2023-10-18 PROCEDURE — 88173 CYTOPATH EVAL FNA REPORT: CPT | Mod: 26,,, | Performed by: PATHOLOGY

## 2023-10-18 PROCEDURE — 10005 FNA BX W/US GDN 1ST LES: CPT | Mod: ,,, | Performed by: PHYSICIAN ASSISTANT

## 2023-10-18 PROCEDURE — 88305 TISSUE EXAM BY PATHOLOGIST: ICD-10-PCS | Mod: 26,,, | Performed by: PATHOLOGY

## 2023-10-18 PROCEDURE — 88172 CYTP DX EVAL FNA 1ST EA SITE: CPT | Mod: 59 | Performed by: PATHOLOGY

## 2023-10-18 PROCEDURE — 88189 PR  FLOWCYTOMETRY/READ, 16 & > MARKERS: ICD-10-PCS | Mod: ,,, | Performed by: PATHOLOGY

## 2023-10-18 PROCEDURE — A4215 STERILE NEEDLE: HCPCS

## 2023-10-18 PROCEDURE — 25000003 PHARM REV CODE 250: Performed by: PHYSICIAN ASSISTANT

## 2023-10-18 RX ORDER — LIDOCAINE HYDROCHLORIDE 10 MG/ML
INJECTION INFILTRATION; PERINEURAL
Status: COMPLETED | OUTPATIENT
Start: 2023-10-18 | End: 2023-10-18

## 2023-10-18 RX ADMIN — LIDOCAINE HYDROCHLORIDE 5 ML: 10 INJECTION, SOLUTION INFILTRATION; PERINEURAL at 03:10

## 2023-10-18 NOTE — PROCEDURES
Interventional Radiology Immediate Post-Procedure Note    Pre-Op Diagnosis: Thyroid Nodule  Post-Op Diagnosis: Same    Procedure:     Procedure performed by: Bell Rios PA-C  Assistants: None    Estimated Blood Loss: Minimal  Specimen Removed: Yes    Findings/description of procedure:  25 g FNA x 2 passes made through nodule in the RIGHT lobe of the thyroid (nodule #2 on US from 10/4/23, TR5).  Adequacy of specimen confirmed.  18 g core x 5 passes of LEFT lateral lymph node and 18 g core x 5 passes of RIGHT lateral lymph node.  Adequacy of specimen confirmed.    No immediate complications. Patient tolerated procedure well. Please see full dictated procedure report for additional details and recommendations.      Cindy Chava, PA-C [facility]

## 2023-10-18 NOTE — DISCHARGE SUMMARY
Interventional Radiology Short Stay Discharge Summary      Admit Date: 10/18/2023  Discharge Date: 10/18/2023     Hospital Course: Uneventful    Discharge Diagnosis: multinodular thyroid and lymphadenopathy    Discharge Condition: Stable    Discharge Disposition: Home    Diet: Resume prior diet    Activity: activity as tolerated    Follow-up: With referring provider    Cindy Chava, PA-C [facility]

## 2023-10-18 NOTE — SEDATION DOCUMENTATION
IR procedure - Thyroid FNA and Lymph core biopsies - are completed. Patient tolerated well. She is awake, alert, and oriented. Vital signs are stable. Patient will return to IR pre/post to complete discharge.   Estimated Blood Loss (Cc): minimal

## 2023-10-18 NOTE — NURSING
Patient is discharged from IR. AVS is printed and reviewed. Upcoming appointments and the [facility] number is highlighted for convenience. The opportunity to ask questions is provided. Patient is ambulatory from unit and directed back to the lobby for exit.

## 2023-10-18 NOTE — H&P
Interventional Radiology Pre-Procedure History & Physical      Chief Complaint/Reason for Referral: multinodular thyroid and lymphadenopathy    History of Present Illness:  Michael Gonzalez is a 28 y.o. female who presents for FNA of TR5 RIGHT lobe thyroid nodule and biopsy of left lateral and right lateral lymph nodes.    Past Medical History:   Diagnosis Date    Allergy     Anxiety     Gallstones     PCOS (polycystic ovarian syndrome)      History reviewed. No pertinent surgical history.    Allergies:   Review of patient's allergies indicates:   Allergen Reactions    Pseudoephedrine hcl Rash and Shortness Of Breath       Home Meds:   Prior to Admission medications    Medication Sig Start Date End Date Taking? Authorizing Provider   drospirenone-ethinyl estradioL (YOLY) 3-0.02 mg per tablet Take 1 tablet by mouth once daily. 8/25/23  Yes Provider, Historical   fluticasone propionate (FLONASE) 50 mcg/actuation nasal spray 2 sprays (100 mcg total) by Each Nostril route once daily. 11/19/19  Yes Patrice Land MD   fluticasone propionate (FLONASE) 50 mcg/actuation nasal spray 2 sprays (100 mcg total) by Each Nostril route 2 (two) times daily. 10/6/23  Yes Daphne Vazquez MD   phentermine 15 MG capsule Take 15 mg by mouth every morning.   Yes Provider, Historical   phentermine 15 MG capsule Take 1 capsule by mouth every morning.   Yes Provider, Historical   spironolactone (ALDACTONE) 50 MG tablet Take 1 tablet by mouth every evening. 8/25/23  Yes Provider, Historical   topiramate (TOPAMAX) 25 MG tablet Take by mouth. 8/25/23  Yes Provider, Historical   topiramate 25 mg capsule Take 25 mg by mouth 2 (two) times daily.   Yes Provider, Historical   azelastine (ASTELIN) 137 mcg (0.1 %) nasal spray 1 spray (137 mcg total) by Nasal route 2 (two) times daily. 10/6/23   Daphne Vazquez MD   drospirenone-ethinyl estradioL (YOLY) 3-0.02 mg per tablet Take 1 tablet by mouth every morning. 11/14/22   Provider, Historical  "  levonorgestreL (MIRENA) 20 mcg/24 hours (6 yrs) 52 mg IUD 1 each by Intrauterine route once. 1/20/21   Provider, Historical   spironolactone (ALDACTONE) 50 MG tablet Take 50 mg by mouth once daily.    Provider, Historical   tiZANidine (ZANAFLEX) 2 MG tablet TAKE 2 TABLETS(4 MG) BY MOUTH EVERY 8 HOURS AS NEEDED FOR MUSCLE TENSION OR MUSCLE SPASMS OR MUSCLE PAIN 9/25/23   Aleyda Gotti PA-C       Anticoagulation/Antiplatelet Meds: no anticoagulation    Review of Systems:   Hematological: no known coagulopathies  Respiratory: no shortness of breath  Cardiovascular: no chest pain  Gastrointestinal: no abdominal pain  Genitourinary: no dysuria  Musculoskeletal: negative  Neurological: no TIA or stroke symptoms     Physical Exam:  Temp: 98.3 °F (36.8 °C) (10/18/23 1414)  Pulse: 89 (10/18/23 1414)  Resp: 18 (10/18/23 1414)  BP: 125/76 (10/18/23 1414)  SpO2: 98 % (10/18/23 1414)    General: WNWD, NAD  HEENT: Normocephalic, sclera anicteric  Neck: Supple  Heart: RRR by pulse  Lungs: Symmetric excursions, breathing unlabored  Abd: Nondistended  Extremities: MAEW  Neuro: AA x 3    Laboratory:  No results found for: "INR", "PT", "PTT"    Lab Results   Component Value Date    WBC 10-20 (A) 05/26/2023    HGB 12.6 02/21/2020    HCT 37.6 02/21/2020    MCV 84.7 02/21/2020     08/30/2019      Lab Results   Component Value Date     09/29/2023     09/29/2023    K 3.8 09/29/2023     09/29/2023    CO2 24 09/29/2023    BUN 7 09/29/2023    CREATININE 0.8 09/29/2023    CALCIUM 9.6 09/29/2023    ALT 20 09/29/2023    AST 17 09/29/2023    ALBUMIN 3.9 09/29/2023    BILITOT 0.3 09/29/2023       Imaging:  US 10/5/2023 reviewed.    Assessment/Plan:  28 y.o. female with multinodular thyroid and lymphadenopathy. Discussed with Dr. West and referring physician Dr. Dewitt.  Will undergo Right lobe thyroid FNA of nodule #2 (TR5) and biopsy of left lateral lymph node and right lateral lymph node " today.    Sedation plan: None    Risks (including, but not limited to, pain, bleeding, infection, damage to nearby structures, treatment failure/recurrence, and the need for additional procedures), potential benefits, and alternatives were discussed with the patient. All questions were answered to the best of my abilities. The patient wishes to proceed. Written informed consent was obtained.    Cindy Chava, PA-C [facility]

## 2023-10-19 LAB
FLOW CYTOMETRY ANTIBODIES ANALYZED - LYMPH NODE: NORMAL
FLOW CYTOMETRY COMMENT - LYMPH NODE: NORMAL
FLOW CYTOMETRY INTERPRETATION - LYMPH NODE: NORMAL

## 2023-10-20 LAB
ADEQUACY: ABNORMAL
ADEQUACY: NORMAL
ADEQUACY: NORMAL
FINAL PATHOLOGIC DIAGNOSIS: ABNORMAL
FINAL PATHOLOGIC DIAGNOSIS: NORMAL
FINAL PATHOLOGIC DIAGNOSIS: NORMAL
GROSS: NORMAL
GROSS: NORMAL
Lab: ABNORMAL
Lab: NORMAL
Lab: NORMAL
MICROSCOPIC EXAM: NORMAL
MICROSCOPIC EXAM: NORMAL

## 2023-10-24 ENCOUNTER — OFFICE VISIT (OUTPATIENT)
Dept: OTOLARYNGOLOGY | Facility: CLINIC | Age: 28
End: 2023-10-24
Payer: MEDICAID

## 2023-10-24 VITALS — BODY MASS INDEX: 29.99 KG/M2 | WEIGHT: 180 LBS | HEIGHT: 65 IN

## 2023-10-24 DIAGNOSIS — R59.0 CERVICAL ADENOPATHY: ICD-10-CM

## 2023-10-24 DIAGNOSIS — C80.1 PAPILLARY CARCINOMA: Primary | ICD-10-CM

## 2023-10-24 PROCEDURE — 99214 PR OFFICE/OUTPT VISIT, EST, LEVL IV, 30-39 MIN: ICD-10-PCS | Mod: S$GLB,,, | Performed by: OTOLARYNGOLOGY

## 2023-10-24 PROCEDURE — 3008F PR BODY MASS INDEX (BMI) DOCUMENTED: ICD-10-PCS | Mod: CPTII,S$GLB,, | Performed by: OTOLARYNGOLOGY

## 2023-10-24 PROCEDURE — 3008F BODY MASS INDEX DOCD: CPT | Mod: CPTII,S$GLB,, | Performed by: OTOLARYNGOLOGY

## 2023-10-24 PROCEDURE — 99214 OFFICE O/P EST MOD 30 MIN: CPT | Mod: S$GLB,,, | Performed by: OTOLARYNGOLOGY

## 2023-10-24 RX ORDER — SODIUM CHLORIDE 0.9 % (FLUSH) 0.9 %
10 SYRINGE (ML) INJECTION
Status: DISCONTINUED | OUTPATIENT
Start: 2023-10-24 | End: 2023-10-26 | Stop reason: CLARIF

## 2023-10-24 NOTE — H&P (VIEW-ONLY)
OTOLARYNGOLOGY CLINIC NOTE  Date:  10/24/2023     Chief complaint:  Chief Complaint   Patient presents with    Follow-up     FNA results and discuss poss surgery        History of Present Illness  Michael Gonzalez is a 28 y.o. female  presenting today for a followup.        Past Medical History  Past Medical History:   Diagnosis Date    Allergy     Anxiety     Gallstones     PCOS (polycystic ovarian syndrome)         Past Surgical History  No past surgical history on file.     Medications  Current Outpatient Medications on File Prior to Visit   Medication Sig Dispense Refill    azelastine (ASTELIN) 137 mcg (0.1 %) nasal spray 1 spray (137 mcg total) by Nasal route 2 (two) times daily. 30 mL 3    drospirenone-ethinyl estradioL (YOLY) 3-0.02 mg per tablet Take 1 tablet by mouth every morning.      drospirenone-ethinyl estradioL (YOLY) 3-0.02 mg per tablet Take 1 tablet by mouth once daily.      fluticasone propionate (FLONASE) 50 mcg/actuation nasal spray 2 sprays (100 mcg total) by Each Nostril route once daily. 1 Bottle 2    fluticasone propionate (FLONASE) 50 mcg/actuation nasal spray 2 sprays (100 mcg total) by Each Nostril route 2 (two) times daily. 18.2 mL 3    levonorgestreL (MIRENA) 20 mcg/24 hours (6 yrs) 52 mg IUD 1 each by Intrauterine route once.      phentermine 15 MG capsule Take 15 mg by mouth every morning.      phentermine 15 MG capsule Take 1 capsule by mouth every morning.      spironolactone (ALDACTONE) 50 MG tablet Take 50 mg by mouth once daily.      spironolactone (ALDACTONE) 50 MG tablet Take 1 tablet by mouth every evening.      tiZANidine (ZANAFLEX) 2 MG tablet TAKE 2 TABLETS(4 MG) BY MOUTH EVERY 8 HOURS AS NEEDED FOR MUSCLE TENSION OR MUSCLE SPASMS OR MUSCLE PAIN 30 tablet 0    topiramate (TOPAMAX) 25 MG tablet Take by mouth.      topiramate 25 mg capsule Take 25 mg by mouth 2 (two) times daily.       No current facility-administered medications on file prior to visit.       Review of  "Systems  Review of Systems   Constitutional: Negative.    Eyes: Negative.    Respiratory:  Positive for shortness of breath.    Cardiovascular: Negative.    Gastrointestinal: Negative.    Genitourinary: Negative.    Musculoskeletal:  Positive for back pain.   Skin: Negative.    Neurological:  Positive for headaches.   Psychiatric/Behavioral: Negative.      Answers submitted by the patient for this visit:  Review of Symptoms Questionnaire  (Submitted on 10/24/2023)  Voice Change?: Yes  Seasonal Allergies?: Yes  Light-headedness: Yes  swollen glands: Yes    Social History   reports that she has quit smoking. Her smoking use included cigarettes. She has never used smokeless tobacco. She reports that she does not drink alcohol and does not use drugs.     Family History  Family History   Problem Relation Age of Onset    No Known Problems Mother     Diabetes Father     Hypertension Father         Physical Exam   There were no vitals filed for this visit. Body mass index is 29.95 kg/m².  Weight: 81.6 kg (180 lb)   Height: 5' 5" (165.1 cm)     GENERAL: no acute distress.  HEAD: normocephalic.   EYES: No scleral icterus  EARS: external ear without lesion, normal pinna shape and position.    NOSE: external nose without significant bony abnormality  ORAL CAVITY/OROPHARYNX: tongue mobile.   NECK: trachea midline. +thyromegaly  LYMPH NODES:bilateral cervical lymphadenopathy.  RESPIRATORY: no stridor, no stertor. Voice normal. Respirations nonlabored.  NEURO: alert, responds to questions appropriately.    PSYCH:mood appropriate      Imaging:  The patient does not have any new imaging of the head and neck since last visit.     Path:  Thyroid, right middle thyroid nodule, fine-needle aspiration:   [place] [facility] Thyroid Cytology Category: Malignant  Papillary thyroid carcinoma.     Right lateral lymph node, ultrasound-guided core biopsy with pathologist adequacy:   Papillary thyroid carcinoma with lymphoid tissue.     Left lateral " lymph node, ultrasound-guided core biopsy with pathologist adequacy:   Lymphoid tissue and skeletal muscle   No evidence of thyroid tissue supported by lack of staining with TTF1   No evidence of malignancy     Labs:  CBC  Recent Labs   Lab 05/26/23  0638   WBC 10-20 A     BMP  Recent Labs   Lab 05/26/23  0623 09/29/23  1156   Glucose  --  102   Sodium 138 139   Potassium 4.0 3.8   Chloride  --  108   CO2  --  24   Carbon Dioxide 24  --    BUN 14.4 7   Creatinine 0.81 0.8   Calcium 9.5 9.6     COAGS        Assessment  1. Papillary carcinoma  - Case Request Operating Room: THYROIDECTOMY,BILATERAL, DISSECTION, NECK    2. Cervical adenopathy       Plan:  Discussed plan of care with patient in detail and all questions answered. Patient reported understanding of plan of care. I gave the patient the opportunity to ask questions and patient confirmed all questions answered to satisfaction.     Discussed results and mgmt options. Will present case at tumor board to discuss left neck but favor surgery even though fna negative.   Discussed about risks, benefits, indications and alternatives including but not limited to weakness of smile ( marginal mandibular nerve), weakness of shoulder motion ( accessory nerve), hoarse voice or need for airway( RLN nerve ), chyle leak, hypoparathyroid, need for further treatment, infection, bleeding. Surgical plan is for total thyroidectomy with central neck dissection and bilateral left and right neck dissections but will discuss at tumor board as well as mentioned above. Will contact her regarding this as well     Will need HARRIETT drain and plan to admit after surgery postop     F/u postop    I spent a total of 33 minutes on the day of the visit.  This includes face to face time and non-face to face time preparing to see the patient (eg, review of tests), obtaining and/or reviewing separately obtained history, documenting clinical information in the electronic or other health record,  independently interpreting results and communicating results to the patient/family/caregiver, or care coordinator.   Please be aware that this note has been generated with the assistance of MModal voice-to-text.  Please excuse any spelling or grammatical errors.

## 2023-10-24 NOTE — PROGRESS NOTES
OTOLARYNGOLOGY CLINIC NOTE  Date:  10/24/2023     Chief complaint:  Chief Complaint   Patient presents with    Follow-up     FNA results and discuss poss surgery        History of Present Illness  Michael Gonzalez is a 28 y.o. female  presenting today for a followup.        Past Medical History  Past Medical History:   Diagnosis Date    Allergy     Anxiety     Gallstones     PCOS (polycystic ovarian syndrome)         Past Surgical History  No past surgical history on file.     Medications  Current Outpatient Medications on File Prior to Visit   Medication Sig Dispense Refill    azelastine (ASTELIN) 137 mcg (0.1 %) nasal spray 1 spray (137 mcg total) by Nasal route 2 (two) times daily. 30 mL 3    drospirenone-ethinyl estradioL (YOLY) 3-0.02 mg per tablet Take 1 tablet by mouth every morning.      drospirenone-ethinyl estradioL (YOLY) 3-0.02 mg per tablet Take 1 tablet by mouth once daily.      fluticasone propionate (FLONASE) 50 mcg/actuation nasal spray 2 sprays (100 mcg total) by Each Nostril route once daily. 1 Bottle 2    fluticasone propionate (FLONASE) 50 mcg/actuation nasal spray 2 sprays (100 mcg total) by Each Nostril route 2 (two) times daily. 18.2 mL 3    levonorgestreL (MIRENA) 20 mcg/24 hours (6 yrs) 52 mg IUD 1 each by Intrauterine route once.      phentermine 15 MG capsule Take 15 mg by mouth every morning.      phentermine 15 MG capsule Take 1 capsule by mouth every morning.      spironolactone (ALDACTONE) 50 MG tablet Take 50 mg by mouth once daily.      spironolactone (ALDACTONE) 50 MG tablet Take 1 tablet by mouth every evening.      tiZANidine (ZANAFLEX) 2 MG tablet TAKE 2 TABLETS(4 MG) BY MOUTH EVERY 8 HOURS AS NEEDED FOR MUSCLE TENSION OR MUSCLE SPASMS OR MUSCLE PAIN 30 tablet 0    topiramate (TOPAMAX) 25 MG tablet Take by mouth.      topiramate 25 mg capsule Take 25 mg by mouth 2 (two) times daily.       No current facility-administered medications on file prior to visit.       Review of  "Systems  Review of Systems   Constitutional: Negative.    Eyes: Negative.    Respiratory:  Positive for shortness of breath.    Cardiovascular: Negative.    Gastrointestinal: Negative.    Genitourinary: Negative.    Musculoskeletal:  Positive for back pain.   Skin: Negative.    Neurological:  Positive for headaches.   Psychiatric/Behavioral: Negative.      Answers submitted by the patient for this visit:  Review of Symptoms Questionnaire  (Submitted on 10/24/2023)  Voice Change?: Yes  Seasonal Allergies?: Yes  Light-headedness: Yes  swollen glands: Yes    Social History   reports that she has quit smoking. Her smoking use included cigarettes. She has never used smokeless tobacco. She reports that she does not drink alcohol and does not use drugs.     Family History  Family History   Problem Relation Age of Onset    No Known Problems Mother     Diabetes Father     Hypertension Father         Physical Exam   There were no vitals filed for this visit. Body mass index is 29.95 kg/m².  Weight: 81.6 kg (180 lb)   Height: 5' 5" (165.1 cm)     GENERAL: no acute distress.  HEAD: normocephalic.   EYES: No scleral icterus  EARS: external ear without lesion, normal pinna shape and position.    NOSE: external nose without significant bony abnormality  ORAL CAVITY/OROPHARYNX: tongue mobile.   NECK: trachea midline. +thyromegaly  LYMPH NODES:bilateral cervical lymphadenopathy.  RESPIRATORY: no stridor, no stertor. Voice normal. Respirations nonlabored.  NEURO: alert, responds to questions appropriately.    PSYCH:mood appropriate      Imaging:  The patient does not have any new imaging of the head and neck since last visit.     Path:  Thyroid, right middle thyroid nodule, fine-needle aspiration:   [place] [facility] Thyroid Cytology Category: Malignant  Papillary thyroid carcinoma.     Right lateral lymph node, ultrasound-guided core biopsy with pathologist adequacy:   Papillary thyroid carcinoma with lymphoid tissue.     Left lateral " lymph node, ultrasound-guided core biopsy with pathologist adequacy:   Lymphoid tissue and skeletal muscle   No evidence of thyroid tissue supported by lack of staining with TTF1   No evidence of malignancy     Labs:  CBC  Recent Labs   Lab 05/26/23  0638   WBC 10-20 A     BMP  Recent Labs   Lab 05/26/23  0623 09/29/23  1156   Glucose  --  102   Sodium 138 139   Potassium 4.0 3.8   Chloride  --  108   CO2  --  24   Carbon Dioxide 24  --    BUN 14.4 7   Creatinine 0.81 0.8   Calcium 9.5 9.6     COAGS        Assessment  1. Papillary carcinoma  - Case Request Operating Room: THYROIDECTOMY,BILATERAL, DISSECTION, NECK    2. Cervical adenopathy       Plan:  Discussed plan of care with patient in detail and all questions answered. Patient reported understanding of plan of care. I gave the patient the opportunity to ask questions and patient confirmed all questions answered to satisfaction.     Discussed results and mgmt options. Will present case at tumor board to discuss left neck but favor surgery even though fna negative.   Discussed about risks, benefits, indications and alternatives including but not limited to weakness of smile ( marginal mandibular nerve), weakness of shoulder motion ( accessory nerve), hoarse voice or need for airway( RLN nerve ), chyle leak, hypoparathyroid, need for further treatment, infection, bleeding. Surgical plan is for total thyroidectomy with central neck dissection and bilateral left and right neck dissections but will discuss at tumor board as well as mentioned above. Will contact her regarding this as well     Will need HARRIETT drain and plan to admit after surgery postop     F/u postop    I spent a total of 33 minutes on the day of the visit.  This includes face to face time and non-face to face time preparing to see the patient (eg, review of tests), obtaining and/or reviewing separately obtained history, documenting clinical information in the electronic or other health record,  independently interpreting results and communicating results to the patient/family/caregiver, or care coordinator.   Please be aware that this note has been generated with the assistance of MModal voice-to-text.  Please excuse any spelling or grammatical errors.

## 2023-10-25 ENCOUNTER — ANESTHESIA EVENT (OUTPATIENT)
Dept: SURGERY | Facility: HOSPITAL | Age: 28
End: 2023-10-25
Payer: MEDICAID

## 2023-10-26 ENCOUNTER — HOSPITAL ENCOUNTER (OUTPATIENT)
Dept: PREADMISSION TESTING | Facility: HOSPITAL | Age: 28
Discharge: HOME | End: 2023-10-26
Attending: OTOLARYNGOLOGY
Payer: MEDICAID

## 2023-10-26 ENCOUNTER — HOSPITAL ENCOUNTER (OUTPATIENT)
Dept: RADIOLOGY | Facility: HOSPITAL | Age: 28
Discharge: HOME | End: 2023-10-26
Attending: OTOLARYNGOLOGY
Payer: MEDICAID

## 2023-10-26 ENCOUNTER — PATIENT MESSAGE (OUTPATIENT)
Dept: SURGERY | Facility: HOSPITAL | Age: 28
End: 2023-10-26
Payer: MEDICAID

## 2023-10-26 VITALS
HEART RATE: 103 BPM | OXYGEN SATURATION: 100 % | TEMPERATURE: 100 F | WEIGHT: 182.31 LBS | BODY MASS INDEX: 30.37 KG/M2 | SYSTOLIC BLOOD PRESSURE: 123 MMHG | DIASTOLIC BLOOD PRESSURE: 85 MMHG | HEIGHT: 65 IN

## 2023-10-26 DIAGNOSIS — Z01.818 PREOP TESTING: ICD-10-CM

## 2023-10-26 LAB
ALBUMIN SERPL BCP-MCNC: 3.7 G/DL (ref 3.5–5.2)
ALP SERPL-CCNC: 54 U/L (ref 55–135)
ALT SERPL W/O P-5'-P-CCNC: 14 U/L (ref 10–44)
ANION GAP SERPL CALC-SCNC: 10 MMOL/L (ref 8–16)
AST SERPL-CCNC: 16 U/L (ref 10–40)
BASOPHILS # BLD AUTO: 0.03 K/UL (ref 0–0.2)
BASOPHILS NFR BLD: 0.4 % (ref 0–1.9)
BILIRUB SERPL-MCNC: 0.3 MG/DL (ref 0.1–1)
BUN SERPL-MCNC: 9 MG/DL (ref 6–20)
CALCIUM SERPL-MCNC: 9.4 MG/DL (ref 8.7–10.5)
CHLORIDE SERPL-SCNC: 108 MMOL/L (ref 95–110)
CO2 SERPL-SCNC: 26 MMOL/L (ref 23–29)
CREAT SERPL-MCNC: 0.9 MG/DL (ref 0.5–1.4)
DIFFERENTIAL METHOD: ABNORMAL
EOSINOPHIL # BLD AUTO: 0.2 K/UL (ref 0–0.5)
EOSINOPHIL NFR BLD: 2.5 % (ref 0–8)
ERYTHROCYTE [DISTWIDTH] IN BLOOD BY AUTOMATED COUNT: 12.7 % (ref 11.5–14.5)
EST. GFR  (NO RACE VARIABLE): >60 ML/MIN/1.73 M^2
GLUCOSE SERPL-MCNC: 74 MG/DL (ref 70–110)
HCT VFR BLD AUTO: 33.6 % (ref 37–48.5)
HGB BLD-MCNC: 11 G/DL (ref 12–16)
IMM GRANULOCYTES # BLD AUTO: 0.03 K/UL (ref 0–0.04)
IMM GRANULOCYTES NFR BLD AUTO: 0.4 % (ref 0–0.5)
LYMPHOCYTES # BLD AUTO: 2 K/UL (ref 1–4.8)
LYMPHOCYTES NFR BLD: 24.3 % (ref 18–48)
MCH RBC QN AUTO: 28.6 PG (ref 27–31)
MCHC RBC AUTO-ENTMCNC: 32.7 G/DL (ref 32–36)
MCV RBC AUTO: 88 FL (ref 82–98)
MONOCYTES # BLD AUTO: 0.5 K/UL (ref 0.3–1)
MONOCYTES NFR BLD: 5.7 % (ref 4–15)
NEUTROPHILS # BLD AUTO: 5.3 K/UL (ref 1.8–7.7)
NEUTROPHILS NFR BLD: 66.7 % (ref 38–73)
NRBC BLD-RTO: 0 /100 WBC
PLATELET # BLD AUTO: 287 K/UL (ref 150–450)
PMV BLD AUTO: 9.5 FL (ref 9.2–12.9)
POTASSIUM SERPL-SCNC: 3.3 MMOL/L (ref 3.5–5.1)
PROT SERPL-MCNC: 7.1 G/DL (ref 6–8.4)
RBC # BLD AUTO: 3.84 M/UL (ref 4–5.4)
SODIUM SERPL-SCNC: 144 MMOL/L (ref 136–145)
WBC # BLD AUTO: 8.01 K/UL (ref 3.9–12.7)

## 2023-10-26 PROCEDURE — 85025 COMPLETE CBC W/AUTO DIFF WBC: CPT | Performed by: OTOLARYNGOLOGY

## 2023-10-26 PROCEDURE — 93005 ELECTROCARDIOGRAM TRACING: CPT

## 2023-10-26 PROCEDURE — 71046 X-RAY EXAM CHEST 2 VIEWS: CPT | Mod: TC,FY

## 2023-10-26 PROCEDURE — 93010 EKG 12-LEAD: ICD-10-PCS | Mod: ,,, | Performed by: INTERNAL MEDICINE

## 2023-10-26 PROCEDURE — 80053 COMPREHEN METABOLIC PANEL: CPT | Performed by: OTOLARYNGOLOGY

## 2023-10-26 PROCEDURE — 93010 ELECTROCARDIOGRAM REPORT: CPT | Mod: ,,, | Performed by: INTERNAL MEDICINE

## 2023-10-26 PROCEDURE — 36415 COLL VENOUS BLD VENIPUNCTURE: CPT | Performed by: OTOLARYNGOLOGY

## 2023-10-26 PROCEDURE — 71046 X-RAY EXAM CHEST 2 VIEWS: CPT | Mod: 26,,, | Performed by: RADIOLOGY

## 2023-10-26 PROCEDURE — 71046 XR CHEST PA AND LATERAL PRE-OP: ICD-10-PCS | Mod: 26,,, | Performed by: RADIOLOGY

## 2023-10-26 NOTE — ANESTHESIA PREPROCEDURE EVALUATION
10/26/2023  Michael Gonzalez is a 28 y.o., female scheduled for THYROIDECTOMY,BILATERAL (Bilateral) - on 10/31/2023.      Past Medical History:   Diagnosis Date    Allergy     Anxiety     Gallstones     PCOS (polycystic ovarian syndrome)          No past surgical history on file.        Pre-op Assessment    I have reviewed the Patient Summary Reports.     I have reviewed the Nursing Notes. I have reviewed the NPO Status.   I have reviewed the Medications.     Review of Systems  Anesthesia Hx:  No problems with previous Anesthesia  Denies Family Hx of Anesthesia complications.    Social:  Smoker, No Alcohol Use    Hematology/Oncology:  Hematology Normal      Oncology Comments: thyroid    EENT/Dental:EENT/Dental Normal   Cardiovascular:   Exercise tolerance: good Denies Hypertension.   Functional Capacity good / => 4 METS    Pulmonary:  Pulmonary Normal    Renal/:  Renal/ Normal     Hepatic/GI:  Hepatic/GI Normal    Musculoskeletal:  Musculoskeletal Normal    Neurological:   Headaches Denies Seizures.    Endocrine:  Endocrine Normal    Dermatological:  Skin Normal    Psych:  Psychiatric Normal           Physical Exam  General: Well nourished, Cooperative, Alert and Oriented    Airway:  Mallampati: II   Mouth Opening: Normal  TM Distance: Normal  Tongue: Normal  Neck ROM: Normal ROM    Dental:  Intact        Anesthesia Plan  Type of Anesthesia, risks & benefits discussed:    Anesthesia Type: Gen ETT  Intra-op Monitoring Plan: Standard ASA Monitors  Post Op Pain Control Plan: multimodal analgesia  Induction:  IV  Airway Plan: , Post-Induction  Informed Consent: Informed consent signed with the Patient and all parties understand the risks and agree with anesthesia plan.  All questions answered. Patient consented to blood products? Yes  ASA Score: 2    Ready For Surgery From Anesthesia Perspective.      .

## 2023-10-26 NOTE — DISCHARGE INSTRUCTIONS
YOUR PROCEDURE WILL BE AT [facility] at [address] [place] [address]                  Before 7 AM, enter through the Emergency Entrance..   After 7 AM enter through the Main Entrance.                 Report to the Same Day Surgery Registration Desk in the hallway.(Just beside the Same Day Surgery Unit)      Your procedure  is scheduled for __10/31/2023________.    Call [phone] between 2pm and 5pm on _10/30/2023______to find out your arrival time for the day of surgery.    You may have two visitors.  No children under 12 years old.     You will be going to the Same Day Surgery Unit on the 2nd floor of the hospital.    Important instructions:  Do not eat anything after midnight.  You may have plain water, non carbonated.  You may also have Gatorade or Powerade after midnight.    Stop all fluids 2 hours before your surgery.    It is okay to brush your teeth.  Do not have gum, candy or mints.    SEE MEDICATION SHEET.   TAKE MEDICATIONS AS DIRECTED WITH SIPS OF WATER.    All GLP-1 weekly diabetic/weight loss medications must not be taken for one week before your surgery, or your surgery could be canceled.      STOP taking Aspirin, Ibuprofen,  Advil, Motrin, Mobic(meloxicam), Aleve (naproxen), Fish oil, and Vitamin E for at least 7 days before your surgery.     You may take Tylenol if needed which is not a blood thinner.    Please shower the night before and the morning of your surgery.      Use Chlorhexidine soap as instructed by your pre op nurse.   Please place clean linens on your bed the night before surgery. Please wear fresh clean clothing after each shower.    No shaving of procedural area at least 4-5 days before surgery due to increased risk of skin irritation and/or possible infection.    Female patients may be asked for a urine specimen on the morning of the surgery.  Please check with your nurse before using the restroom.    Contact lenses and removable denture  work may not be worn during your procedure.    You may wear deodorant only. If you are having breast surgery, do not wear deodorant on the operative side.    Do not wear powder, body lotion, perfume/cologne or make-up.    Do not wear any jewelry or have any metal on your body.    You will be asked to remove any dentures or partials for the procedure.    If you are going home on the same day of surgery, you must arrange for a family member or a friend to drive you home.  Public transportation is prohibited.  You will not be able to drive home if you were given anesthesia or sedation.    Patients who want to have their Post-op prescriptions filled from our in-house [facility], bring a Credit/Debit Card or cash with you. A co-pay may be required.  The pharmacy closes at 5:30 pm.    Wear loose fitting clothes allowing for bandages.    Please leave money and valuables home.      You may bring your cell phone.    Call the doctor if fever or illness should occur before your surgery.    Call [phone] to contact us here if needed.                            CLOTHES ON DAY OF SURGERY    SHOULDER surgery:  you must have a very oversized shirt.  Very, Very large.  You will probably have a large sling on with your arm strapped to your chest.  You will not be able to put the arm of the operated shoulder into a sleeve.  You can put the arm of the un-operated shoulder into the sleeve, but the shirt will need to be draped over the operated shoulder.       ARM or HAND surgery:  make sure that your sleeves are large and loose enough to pass over large dressings or cast.      BREAST or UNDERARM surgery:  wear a loose, button down shirt so that you can dress without raising your arms over your head.    ABDOMINAL surgery:  wear loose, comfortable clothing.  Nothing tight around the abdomen.  NO JEANS    PENIS or SCROTAL surgery:  loose comfortable clothing.  Large sweat pants, pajama pants or a robe.  ABSOLUTELY NO JEANS      LEG  or FOOT surgery:  wear large loose pants that are able to pass over any large dressings or casts.  You could also wear loose shorts or a skirt.

## 2023-10-27 ENCOUNTER — TELEPHONE (OUTPATIENT)
Dept: OTOLARYNGOLOGY | Facility: CLINIC | Age: 28
End: 2023-10-27
Payer: MEDICAID

## 2023-10-27 NOTE — TELEPHONE ENCOUNTER
Spoke with Mrs. Rodriguez she just wanted to get copy of her results, informed her I would print out and have at , pt verb understanding

## 2023-10-27 NOTE — TELEPHONE ENCOUNTER
Spoke with pt, apologized about the confusion, informed pt will get all results ready for her, pt verb understanding

## 2023-10-27 NOTE — TELEPHONE ENCOUNTER
----- Message from Eduin Holder sent at 10/27/2023 10:35 AM CDT -----  Type: Patient Call Back    Who called:self     What is the request in detail: Asking if she can have a print out of her Biopsy results     Can the clinic reply by [facility]? No     Would the patient rather a call back or a response via [facility]? Call     Best call back number: [phone] (home)

## 2023-10-27 NOTE — TELEPHONE ENCOUNTER
----- Message from Pricilla Torres sent at 10/27/2023  2:04 PM CDT -----  Regarding: patient call back  Type: Patient Call Back    Who called: Self     What is the request in detail: Asked for a call back because she needs the complete report of all 3 biopsies that she had done.     Can the clinic reply by [facility]? No     Would the patient rather a call back or a response via [facility]? Call    Best call back number: .[phone]

## 2023-10-30 ENCOUNTER — TELEPHONE (OUTPATIENT)
Dept: SURGERY | Facility: HOSPITAL | Age: 28
End: 2023-10-30
Payer: MEDICAID

## 2023-10-31 ENCOUNTER — HOSPITAL ENCOUNTER (OUTPATIENT)
Facility: HOSPITAL | Age: 28
Discharge: HOME | End: 2023-11-02
Attending: OTOLARYNGOLOGY | Admitting: OTOLARYNGOLOGY
Payer: MEDICAID

## 2023-10-31 ENCOUNTER — ANESTHESIA (OUTPATIENT)
Dept: SURGERY | Facility: HOSPITAL | Age: 28
End: 2023-10-31
Payer: MEDICAID

## 2023-10-31 DIAGNOSIS — C73 THYROID CANCER: ICD-10-CM

## 2023-10-31 LAB
ABO + RH BLD: NORMAL
B-HCG UR QL: NEGATIVE
BLD GP AB SCN CELLS X3 SERPL QL: NORMAL
CTP QC/QA: YES
PTH-INTACT SERPL-MCNC: 18.1 PG/ML (ref 9–77)
RH BLD: NORMAL
SPECIMEN OUTDATE: NORMAL

## 2023-10-31 PROCEDURE — 71000039 HC RECOVERY, EACH ADD'L HOUR: Performed by: OTOLARYNGOLOGY

## 2023-10-31 PROCEDURE — 63600175 PHARM REV CODE 636 W HCPCS: Performed by: ANESTHESIOLOGY

## 2023-10-31 PROCEDURE — 25000242 PHARM REV CODE 250 ALT 637 W/ HCPCS: Performed by: STUDENT IN AN ORGANIZED HEALTH CARE EDUCATION/TRAINING PROGRAM

## 2023-10-31 PROCEDURE — 88307 TISSUE EXAM BY PATHOLOGIST: CPT | Performed by: PATHOLOGY

## 2023-10-31 PROCEDURE — 27201423 OPTIME MED/SURG SUP & DEVICES STERILE SUPPLY: Performed by: OTOLARYNGOLOGY

## 2023-10-31 PROCEDURE — 81025 URINE PREGNANCY TEST: CPT | Performed by: OTOLARYNGOLOGY

## 2023-10-31 PROCEDURE — 63600175 PHARM REV CODE 636 W HCPCS: Performed by: OTOLARYNGOLOGY

## 2023-10-31 PROCEDURE — 25000003 PHARM REV CODE 250: Performed by: ANESTHESIOLOGY

## 2023-10-31 PROCEDURE — 88307 PR  SURG PATH,LEVEL V: ICD-10-PCS | Mod: 26,,, | Performed by: PATHOLOGY

## 2023-10-31 PROCEDURE — D9220A PRA ANESTHESIA: ICD-10-PCS | Mod: CRNA,,, | Performed by: STUDENT IN AN ORGANIZED HEALTH CARE EDUCATION/TRAINING PROGRAM

## 2023-10-31 PROCEDURE — 63600175 PHARM REV CODE 636 W HCPCS: Performed by: STUDENT IN AN ORGANIZED HEALTH CARE EDUCATION/TRAINING PROGRAM

## 2023-10-31 PROCEDURE — D9220A PRA ANESTHESIA: ICD-10-PCS | Mod: ANES,,, | Performed by: ANESTHESIOLOGY

## 2023-10-31 PROCEDURE — 25000003 PHARM REV CODE 250: Performed by: STUDENT IN AN ORGANIZED HEALTH CARE EDUCATION/TRAINING PROGRAM

## 2023-10-31 PROCEDURE — 86901 BLOOD TYPING SEROLOGIC RH(D): CPT | Performed by: OTOLARYNGOLOGY

## 2023-10-31 PROCEDURE — 60252 REMOVAL OF THYROID: CPT | Mod: 51,,, | Performed by: OTOLARYNGOLOGY

## 2023-10-31 PROCEDURE — 38724 PR REMOVAL NODES, NECK,CERV MOD RAD: ICD-10-PCS | Mod: 50,,, | Performed by: OTOLARYNGOLOGY

## 2023-10-31 PROCEDURE — 37000009 HC ANESTHESIA EA ADD 15 MINS: Performed by: OTOLARYNGOLOGY

## 2023-10-31 PROCEDURE — 36415 COLL VENOUS BLD VENIPUNCTURE: CPT | Performed by: OTOLARYNGOLOGY

## 2023-10-31 PROCEDURE — 36000706: Performed by: OTOLARYNGOLOGY

## 2023-10-31 PROCEDURE — 71000033 HC RECOVERY, INTIAL HOUR: Performed by: OTOLARYNGOLOGY

## 2023-10-31 PROCEDURE — 60252 PR THYROIDECTOMY,MALIG,LTD NECK SURG: ICD-10-PCS | Mod: 51,,, | Performed by: OTOLARYNGOLOGY

## 2023-10-31 PROCEDURE — 86900 BLOOD TYPING SEROLOGIC ABO: CPT | Performed by: OTOLARYNGOLOGY

## 2023-10-31 PROCEDURE — 37000008 HC ANESTHESIA 1ST 15 MINUTES: Performed by: OTOLARYNGOLOGY

## 2023-10-31 PROCEDURE — 83970 ASSAY OF PARATHORMONE: CPT | Performed by: OTOLARYNGOLOGY

## 2023-10-31 PROCEDURE — D9220A PRA ANESTHESIA: Mod: ANES,,, | Performed by: ANESTHESIOLOGY

## 2023-10-31 PROCEDURE — 36000707: Performed by: OTOLARYNGOLOGY

## 2023-10-31 PROCEDURE — 25000003 PHARM REV CODE 250: Performed by: OTOLARYNGOLOGY

## 2023-10-31 PROCEDURE — C1729 CATH, DRAINAGE: HCPCS | Performed by: OTOLARYNGOLOGY

## 2023-10-31 PROCEDURE — 38724 REMOVAL OF LYMPH NODES NECK: CPT | Mod: 50,,, | Performed by: OTOLARYNGOLOGY

## 2023-10-31 PROCEDURE — D9220A PRA ANESTHESIA: Mod: CRNA,,, | Performed by: STUDENT IN AN ORGANIZED HEALTH CARE EDUCATION/TRAINING PROGRAM

## 2023-10-31 PROCEDURE — 88307 TISSUE EXAM BY PATHOLOGIST: CPT | Mod: 26,,, | Performed by: PATHOLOGY

## 2023-10-31 RX ORDER — ALBUTEROL SULFATE 90 UG/1
AEROSOL, METERED RESPIRATORY (INHALATION)
Status: DISCONTINUED | OUTPATIENT
Start: 2023-10-31 | End: 2023-10-31

## 2023-10-31 RX ORDER — CEFAZOLIN SODIUM 2 G/50ML
2 SOLUTION INTRAVENOUS
Status: COMPLETED | OUTPATIENT
Start: 2023-10-31 | End: 2023-11-01

## 2023-10-31 RX ORDER — CALCIUM CARBONATE 200(500)MG
1000 TABLET,CHEWABLE ORAL 2 TIMES DAILY
Status: DISCONTINUED | OUTPATIENT
Start: 2023-10-31 | End: 2023-11-01

## 2023-10-31 RX ORDER — CEFAZOLIN SODIUM 1 G/3ML
INJECTION, POWDER, FOR SOLUTION INTRAMUSCULAR; INTRAVENOUS
Status: DISCONTINUED | OUTPATIENT
Start: 2023-10-31 | End: 2023-10-31

## 2023-10-31 RX ORDER — HYDROCODONE BITARTRATE AND ACETAMINOPHEN 5; 325 MG/1; MG/1
1 TABLET ORAL EVERY 4 HOURS PRN
Status: DISCONTINUED | OUTPATIENT
Start: 2023-10-31 | End: 2023-11-01

## 2023-10-31 RX ORDER — HYDROMORPHONE HYDROCHLORIDE 2 MG/ML
0.2 INJECTION, SOLUTION INTRAMUSCULAR; INTRAVENOUS; SUBCUTANEOUS EVERY 5 MIN PRN
Status: DISCONTINUED | OUTPATIENT
Start: 2023-10-31 | End: 2023-10-31

## 2023-10-31 RX ORDER — HYDROMORPHONE HYDROCHLORIDE 2 MG/ML
1 INJECTION, SOLUTION INTRAMUSCULAR; INTRAVENOUS; SUBCUTANEOUS EVERY 4 HOURS PRN
Status: DISCONTINUED | OUTPATIENT
Start: 2023-10-31 | End: 2023-10-31

## 2023-10-31 RX ORDER — ONDANSETRON 2 MG/ML
INJECTION INTRAMUSCULAR; INTRAVENOUS
Status: DISCONTINUED | OUTPATIENT
Start: 2023-10-31 | End: 2023-10-31

## 2023-10-31 RX ORDER — SODIUM CHLORIDE 9 MG/ML
INJECTION, SOLUTION INTRAVENOUS CONTINUOUS
Status: DISCONTINUED | OUTPATIENT
Start: 2023-10-31 | End: 2023-10-31

## 2023-10-31 RX ORDER — BACITRACIN 500 [USP'U]/G
OINTMENT TOPICAL 2 TIMES DAILY
Status: DISCONTINUED | OUTPATIENT
Start: 2023-10-31 | End: 2023-11-02 | Stop reason: HOSPADM

## 2023-10-31 RX ORDER — OXYMETAZOLINE HCL 0.05 %
SPRAY, NON-AEROSOL (ML) NASAL
Status: DISCONTINUED | OUTPATIENT
Start: 2023-10-31 | End: 2023-10-31 | Stop reason: HOSPADM

## 2023-10-31 RX ORDER — ACETAMINOPHEN 10 MG/ML
1000 INJECTION, SOLUTION INTRAVENOUS ONCE
Status: COMPLETED | OUTPATIENT
Start: 2023-10-31 | End: 2023-10-31

## 2023-10-31 RX ORDER — KETAMINE HYDROCHLORIDE 100 MG/ML
INJECTION, SOLUTION INTRAMUSCULAR; INTRAVENOUS
Status: DISCONTINUED | OUTPATIENT
Start: 2023-10-31 | End: 2023-10-31

## 2023-10-31 RX ORDER — LIDOCAINE HYDROCHLORIDE 20 MG/ML
INJECTION INTRAVENOUS
Status: DISCONTINUED | OUTPATIENT
Start: 2023-10-31 | End: 2023-10-31

## 2023-10-31 RX ORDER — FENTANYL CITRATE 50 UG/ML
INJECTION, SOLUTION INTRAMUSCULAR; INTRAVENOUS
Status: DISCONTINUED | OUTPATIENT
Start: 2023-10-31 | End: 2023-10-31

## 2023-10-31 RX ORDER — DEXAMETHASONE SODIUM PHOSPHATE 4 MG/ML
INJECTION, SOLUTION INTRA-ARTICULAR; INTRALESIONAL; INTRAMUSCULAR; INTRAVENOUS; SOFT TISSUE
Status: DISCONTINUED | OUTPATIENT
Start: 2023-10-31 | End: 2023-10-31

## 2023-10-31 RX ORDER — DIPHENHYDRAMINE HYDROCHLORIDE 50 MG/ML
INJECTION INTRAMUSCULAR; INTRAVENOUS
Status: DISCONTINUED | OUTPATIENT
Start: 2023-10-31 | End: 2023-10-31

## 2023-10-31 RX ORDER — PHENYLEPHRINE HYDROCHLORIDE 10 MG/ML
INJECTION INTRAVENOUS
Status: DISCONTINUED | OUTPATIENT
Start: 2023-10-31 | End: 2023-10-31

## 2023-10-31 RX ORDER — CEFAZOLIN SODIUM 2 G/50ML
2 SOLUTION INTRAVENOUS
Status: COMPLETED | OUTPATIENT
Start: 2023-10-31 | End: 2023-10-31

## 2023-10-31 RX ORDER — MIDAZOLAM HYDROCHLORIDE 1 MG/ML
INJECTION, SOLUTION INTRAMUSCULAR; INTRAVENOUS
Status: DISCONTINUED | OUTPATIENT
Start: 2023-10-31 | End: 2023-10-31

## 2023-10-31 RX ORDER — SODIUM CHLORIDE 0.9 % (FLUSH) 0.9 %
10 SYRINGE (ML) INJECTION
Status: DISCONTINUED | OUTPATIENT
Start: 2023-10-31 | End: 2023-10-31

## 2023-10-31 RX ORDER — LIDOCAINE HYDROCHLORIDE 10 MG/ML
1 INJECTION, SOLUTION EPIDURAL; INFILTRATION; INTRACAUDAL; PERINEURAL ONCE
Status: DISCONTINUED | OUTPATIENT
Start: 2023-10-31 | End: 2023-10-31

## 2023-10-31 RX ORDER — ONDANSETRON 2 MG/ML
4 INJECTION INTRAMUSCULAR; INTRAVENOUS EVERY 12 HOURS PRN
Status: DISCONTINUED | OUTPATIENT
Start: 2023-10-31 | End: 2023-11-02 | Stop reason: HOSPADM

## 2023-10-31 RX ORDER — HYDROMORPHONE HYDROCHLORIDE 1 MG/ML
1 INJECTION, SOLUTION INTRAMUSCULAR; INTRAVENOUS; SUBCUTANEOUS EVERY 4 HOURS PRN
Status: DISCONTINUED | OUTPATIENT
Start: 2023-10-31 | End: 2023-11-02

## 2023-10-31 RX ORDER — MUPIROCIN 20 MG/G
OINTMENT TOPICAL
Status: DISCONTINUED | OUTPATIENT
Start: 2023-10-31 | End: 2023-10-31 | Stop reason: HOSPADM

## 2023-10-31 RX ORDER — ROCURONIUM BROMIDE 10 MG/ML
INJECTION, SOLUTION INTRAVENOUS
Status: DISCONTINUED | OUTPATIENT
Start: 2023-10-31 | End: 2023-10-31

## 2023-10-31 RX ORDER — PROPOFOL 10 MG/ML
VIAL (ML) INTRAVENOUS
Status: DISCONTINUED | OUTPATIENT
Start: 2023-10-31 | End: 2023-10-31

## 2023-10-31 RX ORDER — ACETAMINOPHEN 500 MG
1000 TABLET ORAL EVERY 6 HOURS PRN
Status: ON HOLD | COMMUNITY
End: 2023-11-02 | Stop reason: HOSPADM

## 2023-10-31 RX ORDER — SUCCINYLCHOLINE CHLORIDE 20 MG/ML
INJECTION INTRAMUSCULAR; INTRAVENOUS
Status: DISCONTINUED | OUTPATIENT
Start: 2023-10-31 | End: 2023-10-31

## 2023-10-31 RX ADMIN — FENTANYL CITRATE 25 MCG: 50 INJECTION, SOLUTION INTRAMUSCULAR; INTRAVENOUS at 01:10

## 2023-10-31 RX ADMIN — HYDROMORPHONE HYDROCHLORIDE 0.2 MG: 2 INJECTION, SOLUTION INTRAMUSCULAR; INTRAVENOUS; SUBCUTANEOUS at 06:10

## 2023-10-31 RX ADMIN — HYDROCODONE BITARTRATE AND ACETAMINOPHEN 1 TABLET: 5; 325 TABLET ORAL at 09:10

## 2023-10-31 RX ADMIN — SODIUM CHLORIDE: 9 INJECTION, SOLUTION INTRAVENOUS at 12:10

## 2023-10-31 RX ADMIN — SODIUM CHLORIDE: 9 INJECTION, SOLUTION INTRAVENOUS at 08:10

## 2023-10-31 RX ADMIN — FENTANYL CITRATE 25 MCG: 50 INJECTION, SOLUTION INTRAMUSCULAR; INTRAVENOUS at 10:10

## 2023-10-31 RX ADMIN — FENTANYL CITRATE 50 MCG: 50 INJECTION, SOLUTION INTRAMUSCULAR; INTRAVENOUS at 09:10

## 2023-10-31 RX ADMIN — ALBUTEROL SULFATE 5 PUFF: 90 AEROSOL, METERED RESPIRATORY (INHALATION) at 10:10

## 2023-10-31 RX ADMIN — CEFAZOLIN SODIUM 2 G: 1 POWDER, FOR SOLUTION INTRAMUSCULAR; INTRAVENOUS at 02:10

## 2023-10-31 RX ADMIN — PROPOFOL 70 MG: 10 INJECTION, EMULSION INTRAVENOUS at 04:10

## 2023-10-31 RX ADMIN — PHENYLEPHRINE HYDROCHLORIDE 100 MCG: 10 INJECTION INTRAVENOUS at 11:10

## 2023-10-31 RX ADMIN — DIPHENHYDRAMINE HYDROCHLORIDE 25 MG: 50 INJECTION INTRAMUSCULAR; INTRAVENOUS at 10:10

## 2023-10-31 RX ADMIN — KETAMINE HYDROCHLORIDE 30 MG: 100 INJECTION, SOLUTION, CONCENTRATE INTRAMUSCULAR; INTRAVENOUS at 10:10

## 2023-10-31 RX ADMIN — ONDANSETRON 4 MG: 2 INJECTION INTRAMUSCULAR; INTRAVENOUS at 09:10

## 2023-10-31 RX ADMIN — PHENYLEPHRINE HYDROCHLORIDE 40 MCG/MIN: 10 INJECTION INTRAVENOUS at 03:10

## 2023-10-31 RX ADMIN — ROCURONIUM BROMIDE 20 MG: 10 INJECTION, SOLUTION INTRAVENOUS at 01:10

## 2023-10-31 RX ADMIN — ONDANSETRON 4 MG: 2 INJECTION, SOLUTION INTRAMUSCULAR; INTRAVENOUS at 10:10

## 2023-10-31 RX ADMIN — PHENYLEPHRINE HYDROCHLORIDE 100 MCG: 10 INJECTION INTRAVENOUS at 10:10

## 2023-10-31 RX ADMIN — SUCCINYLCHOLINE CHLORIDE 100 MG: 20 INJECTION, SOLUTION INTRAMUSCULAR; INTRAVENOUS at 09:10

## 2023-10-31 RX ADMIN — PROPOFOL 50 MG: 10 INJECTION, EMULSION INTRAVENOUS at 10:10

## 2023-10-31 RX ADMIN — CEFAZOLIN SODIUM 2 G: 2 SOLUTION INTRAVENOUS at 09:10

## 2023-10-31 RX ADMIN — DEXAMETHASONE SODIUM PHOSPHATE 8 MG: 4 INJECTION, SOLUTION INTRAMUSCULAR; INTRAVENOUS at 10:10

## 2023-10-31 RX ADMIN — HYDROMORPHONE HYDROCHLORIDE 0.2 MG: 2 INJECTION, SOLUTION INTRAMUSCULAR; INTRAVENOUS; SUBCUTANEOUS at 07:10

## 2023-10-31 RX ADMIN — KETAMINE HYDROCHLORIDE 20 MG: 100 INJECTION, SOLUTION, CONCENTRATE INTRAMUSCULAR; INTRAVENOUS at 12:10

## 2023-10-31 RX ADMIN — FENTANYL CITRATE 25 MCG: 50 INJECTION, SOLUTION INTRAMUSCULAR; INTRAVENOUS at 05:10

## 2023-10-31 RX ADMIN — ACETAMINOPHEN 1000 MG: 10 INJECTION INTRAVENOUS at 06:10

## 2023-10-31 RX ADMIN — ROCURONIUM BROMIDE 20 MG: 10 INJECTION, SOLUTION INTRAVENOUS at 02:10

## 2023-10-31 RX ADMIN — SUGAMMADEX 200 MG: 100 INJECTION, SOLUTION INTRAVENOUS at 05:10

## 2023-10-31 RX ADMIN — FENTANYL CITRATE 50 MCG: 50 INJECTION, SOLUTION INTRAMUSCULAR; INTRAVENOUS at 04:10

## 2023-10-31 RX ADMIN — FENTANYL CITRATE 25 MCG: 50 INJECTION, SOLUTION INTRAMUSCULAR; INTRAVENOUS at 06:10

## 2023-10-31 RX ADMIN — FENTANYL CITRATE 25 MCG: 50 INJECTION, SOLUTION INTRAMUSCULAR; INTRAVENOUS at 02:10

## 2023-10-31 RX ADMIN — FENTANYL CITRATE 25 MCG: 50 INJECTION, SOLUTION INTRAMUSCULAR; INTRAVENOUS at 04:10

## 2023-10-31 RX ADMIN — KETAMINE HYDROCHLORIDE 20 MG: 100 INJECTION, SOLUTION, CONCENTRATE INTRAMUSCULAR; INTRAVENOUS at 11:10

## 2023-10-31 RX ADMIN — CEFAZOLIN SODIUM 2 G: 2 SOLUTION INTRAVENOUS at 10:10

## 2023-10-31 RX ADMIN — LIDOCAINE HYDROCHLORIDE 100 MG: 20 INJECTION, SOLUTION INTRAVENOUS at 09:10

## 2023-10-31 RX ADMIN — ROCURONIUM BROMIDE 20 MG: 10 INJECTION, SOLUTION INTRAVENOUS at 05:10

## 2023-10-31 RX ADMIN — KETAMINE HYDROCHLORIDE 20 MG: 100 INJECTION, SOLUTION, CONCENTRATE INTRAMUSCULAR; INTRAVENOUS at 02:10

## 2023-10-31 RX ADMIN — ROCURONIUM BROMIDE 20 MG: 10 INJECTION, SOLUTION INTRAVENOUS at 04:10

## 2023-10-31 RX ADMIN — PHENYLEPHRINE HYDROCHLORIDE 30 MCG/MIN: 10 INJECTION INTRAVENOUS at 11:10

## 2023-10-31 RX ADMIN — SODIUM CHLORIDE, SODIUM LACTATE, POTASSIUM CHLORIDE, AND CALCIUM CHLORIDE: .6; .31; .03; .02 INJECTION, SOLUTION INTRAVENOUS at 10:10

## 2023-10-31 RX ADMIN — PHENYLEPHRINE HYDROCHLORIDE 150 MCG: 10 INJECTION INTRAVENOUS at 10:10

## 2023-10-31 RX ADMIN — FENTANYL CITRATE 100 MCG: 50 INJECTION, SOLUTION INTRAMUSCULAR; INTRAVENOUS at 10:10

## 2023-10-31 RX ADMIN — MIDAZOLAM HYDROCHLORIDE 2 MG: 1 INJECTION, SOLUTION INTRAMUSCULAR; INTRAVENOUS at 09:10

## 2023-10-31 RX ADMIN — ONDANSETRON 4 MG: 2 INJECTION, SOLUTION INTRAMUSCULAR; INTRAVENOUS at 05:10

## 2023-10-31 RX ADMIN — KETAMINE HYDROCHLORIDE 10 MG: 100 INJECTION, SOLUTION, CONCENTRATE INTRAMUSCULAR; INTRAVENOUS at 02:10

## 2023-10-31 RX ADMIN — PROPOFOL 200 MG: 10 INJECTION, EMULSION INTRAVENOUS at 09:10

## 2023-10-31 NOTE — BRIEF OP NOTE
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 Accessory nerve and RLN identified and preserved.     Estimated Blood Loss: 150 mL    Estimated Blood Loss has been documented.         Specimens:   Specimen (24h ago, onward)       Start     Ordered    10/31/23 1812  Specimen to Pathology, Surgery ENT  Once        Comments: Pre-op Diagnosis: Papillary carcinoma [C80.1]Procedure(s):THYROIDECTOMY,BILATERALDISSECTION, NECK Number of specimens: 4Name of specimens: 1. Central Neck Dissection2. Total Thyroid3. Right Neck 2 through 54. Left Neck 2 through 5     References:    Click here for ordering Quick Tip   Question Answer Comment   Procedure Type: ENT    Specimen Class: Routine/Screening    Which provider would you like to cc? DAPHNE DONG    Release to patient Immediate        10/31/23 1813    Pending  Specimen to Pathology, Surgery ENT  Once        Comments: Pre-op Diagnosis: Papillary carcinoma [C80.1]Procedure(s):THYROIDECTOMY,BILATERALDISSECTION, NECK Number of specimens: 1Name of specimens:     References:    Click here for ordering Quick Tip   Question Answer Comment   Procedure Type: ENT    Specimen Class: Routine/Screening    Which provider would you like to cc? DAPHNE DONG    Release to patient Immediate        Pending                    [id number]

## 2023-10-31 NOTE — TRANSFER OF CARE
Anesthesia Transfer of Care Note    Patient: Michael Gonzalez    Procedure(s) Performed: Procedure(s) (LRB):  THYROIDECTOMY,BILATERAL (Bilateral)  DISSECTION, NECK (Bilateral)    Patient location: PACU    Anesthesia Type: general    Transport from OR: Transported from OR on 6-10 L/min O2 by face mask with adequate spontaneous ventilation    Post pain: adequate analgesia    Post assessment: no apparent anesthetic complications and tolerated procedure well    Post vital signs: stable    Level of consciousness: sedated and responds to stimulation    Nausea/Vomiting: no nausea/vomiting    Complications: none    Transfer of care protocol was followed      Last vitals:   Visit Vitals  BP (!) 103/55   Pulse 99   Temp 36.9 °C (98.4 °F) (Temporal)   Resp (!) 21   LMP 10/19/2023 (Exact Date)   SpO2 100%   Breastfeeding No

## 2023-10-31 NOTE — OP NOTE
[facility] Surgery  Surgery Department  Operative Note    SUMMARY     Date of Procedure: 10/31/2023     Procedure: Procedure(s) (LRB):  THYROIDECTOMY,BILATERAL (Bilateral)  DISSECTION, NECK (Bilateral) level 2-5  Central neck dissection     Surgeon(s) and Role:     * Daphne Vazquez MD - Primary    Assisting Surgeon: None    Pre-Operative Diagnosis: Papillary carcinoma [C80.1]    Post-Operative Diagnosis: Post-Op Diagnosis Codes:     * Papillary carcinoma [C80.1]    Anesthesia: General    Operative Findings (including complications, if any):  multiple enlarged lymph nodes bilaterally and in central compartment. Accessory nerve and RLN identified and preserved.        Operative Report in Detail :   The patient was brought to the operating room and placed on the operating table in the supine position.  The patient was then transorally intubated with a laryngeal nerve monitoring endotracheal tube, with visualization to confirm correct placement of the electrodes. An incision was planned in a transverse skin crease approximately 2 fingerbreadths above sternal notch in a natural skin crease, with the neck extended.  The neck was prepped and draped in sterile fashion.       A time-out was performed and the correct patient and procedure were identified.  A bilateral hockey stick incision was made with  bovie electrocautery, which was carried  through the skin and subcutaneous tissues and then through the platysma to the level of the strap muscles.  Sub platysmal flaps were raised.  The strap muscles were identified and divided in the midline raphe.  The strap muscles were then bluntly dissected off of the thyroid gland laterally in the carotid artery was identified.  The superior pole was then mobilized using acosta forceps.  Harmonic scalpel was used to carefully ligate the superior pole with preservation of the superior laryngeal nerve.  Once this was taken down the thyroid was rotated medially.  Nerve stimulation  "was performed at the cricothyroid joint and stimulated movement of cricothyroid muscle.  We then rotated the gland again and dissected along the expected course of the recurrent laryngeal nerve starting inferiorly.  Parathyroid tissue was identified and carefully removed from the thyroid gland with care to maintain adequate blood supply. The recurrent laryngeal nerve was identified and followed superiorly to the joint allowing for additional dissection of the gland off of the trachea.    Careful dissection along the capsule was performed.  This allowed for rotation out of the wound bed and the remaining thyroid gland attached to the trachea ( isthmus) was dissected off of the trachea to the middle portion of the trachea. Central neck dissection on the right side was then performed. There were multiple enlarged lymph nodes extending substernal. The course of the recurrent laryngeal nerve was followed along inferior extent to separate the nerve from the jorge luis dissection packet. The fatty packet was taken down to the prevertebral fascia and followed down to the innominate artery. The packet was rotated toward the midline . Next the left thyroid was addressed. The left gland was removed as described above with the right thyroid. Once the nerve had been identified and the gland had been safely rotated away from the nerve, the total thyroid was able to be removed with meticulous dissection off of the trachea. Central neck dissection was performed on the left side as described on the right however there were no obvious nodes in the fatty packet. This allowed for the entire packet to be removed and was called "central neck dissection".  The thyroid specimen was  inspected after passing it off of the field to ensure no parathyroid tissue present.      Next we proceeded to right neck dissection. The fibroadipose tissue of levels 2a,2b,3 and 4  and 5 were removed. This was achieved with the following steps: The fascia " underneath the submandibular gland was incised to identify the digastric muscle. This was followed posteriorly to the location of the internal jugular vein. The fascia anterior to the sternocleidomastoid muscle was then skeletonized off of the muscle from this location inferiorly to the level of the omohyoid muscle. The accessory nerve was identified and skeletonized as it approached the internal jugular vein . Fibrofatty tissue was idssected down to the floor of the neck and then rotated medially. Additional fibrofatty tissue between the anterior jugular vein and internal jugular vein was removed with the packet and the packet was dissected carefully with 15 blade and bipolar for the internal jugular vein. The same procedure was performed  on the left side.     The wound bed was copiously irrigated with saline and there was excellent hemostasis. A HARRIETT drain was placed in the thyroid bed , left and right neck for a total of 3 drains. The drains were sutured to the skin with 3-0 nylon. The strap muscles were realigned using 3-0 Vicryl sutures.  The platysma was realigned using 3-0 Vicryl sutures. Subcutaneous tissue was closed with 4-0 vicryl.  Skin was closed with staples.  all specimens were sent for permanent pathology. The patient was then handed back over to Anesthesia and woken up without complication.  I was present and scrubbed for duration of case and performed all key portions of procedure.     Significant Surgical Tasks Conducted by the Assistant(s), if Applicable: none    Estimated Blood Loss (EBL): 150 mL           Implants: none; HARRIETT drain x3    Specimens:   Specimen (24h ago, onward)       Start     Ordered    10/31/23 1812  Specimen to Pathology, Surgery ENT  Once        Comments: Pre-op Diagnosis: Papillary carcinoma [C80.1]Procedure(s):THYROIDECTOMY,BILATERALDISSECTION, NECK Number of specimens: 4Name of specimens: 1. Central Neck Dissection2. Total Thyroid3. Right Neck 2 through 54. Left Neck 2  through 5     References:    Click here for ordering Quick Tip   Question Answer Comment   Procedure Type: ENT    Specimen Class: Routine/Screening    Which provider would you like to cc? JULIET DONG    Release to patient Immediate        10/31/23 1813    Pending  Specimen to Pathology, Surgery ENT  Once        Comments: Pre-op Diagnosis: Papillary carcinoma [C80.1]Procedure(s):THYROIDECTOMY,BILATERALDISSECTION, NECK Number of specimens: 1Name of specimens:      References:    Click here for ordering Quick Tip   Question Answer Comment   Procedure Type: ENT    Specimen Class: Routine/Screening    Which provider would you like to cc? JULIET DONG    Release to patient Immediate        Pending                            Condition: Good    Disposition: PACU - hemodynamically stable.    Attestation: I was present and scrubbed for the entire procedure.

## 2023-10-31 NOTE — ANESTHESIA PROCEDURE NOTES
Intubation    Date/Time: 10/31/2023 10:00 AM    Performed by: Bernadette Vieira CRNA  Authorized by: Ted Haynes MD    Intubation:     Induction:  Intravenous    Intubated:  Postinduction    Mask Ventilation:  Easy mask    Attempts:  1    Attempted By:  Student    Method of Intubation:  Video laryngoscopy    Blade:  Philip 3    Laryngeal View Grade: Grade I - full view of cords      Difficult Airway Encountered?: No      Complications:  None    Airway Device:  EMG ETT (NIMS)    Airway Device Size:  6.0    Style/Cuff Inflation:  Cuffed (inflated to minimal occlusive pressure)    Inflation Amount (mL):  7    Tube secured:  21    Secured at:  The lips    Placement Verified By:  Capnometry    Complicating Factors:  None    Findings Post-Intubation:  Atraumatic/condition of teeth unchanged and BS equal bilateral

## 2023-10-31 NOTE — OR NURSING
J. Spera, CRNA notified of patient's hacky cough and 99.5 . Patient stated last pm and  this am productive cough small amount light green sputum. No sputum production since arrival to [facility].  Christofer will notify Dr. Haynes

## 2023-11-01 LAB
CA-I BLDV-SCNC: 1.11 MMOL/L (ref 1.06–1.42)
PTH-INTACT SERPL-MCNC: 23.2 PG/ML (ref 9–77)

## 2023-11-01 PROCEDURE — 25000003 PHARM REV CODE 250: Performed by: OTOLARYNGOLOGY

## 2023-11-01 PROCEDURE — 97535 SELF CARE MNGMENT TRAINING: CPT

## 2023-11-01 PROCEDURE — 97165 OT EVAL LOW COMPLEX 30 MIN: CPT

## 2023-11-01 PROCEDURE — 99900035 HC TECH TIME PER 15 MIN (STAT)

## 2023-11-01 PROCEDURE — 36415 COLL VENOUS BLD VENIPUNCTURE: CPT | Performed by: OTOLARYNGOLOGY

## 2023-11-01 PROCEDURE — 97161 PT EVAL LOW COMPLEX 20 MIN: CPT

## 2023-11-01 PROCEDURE — 94640 AIRWAY INHALATION TREATMENT: CPT

## 2023-11-01 PROCEDURE — 83970 ASSAY OF PARATHORMONE: CPT | Performed by: OTOLARYNGOLOGY

## 2023-11-01 PROCEDURE — 82330 ASSAY OF CALCIUM: CPT | Performed by: OTOLARYNGOLOGY

## 2023-11-01 PROCEDURE — 63600175 PHARM REV CODE 636 W HCPCS: Performed by: OTOLARYNGOLOGY

## 2023-11-01 PROCEDURE — 25000242 PHARM REV CODE 250 ALT 637 W/ HCPCS: Performed by: OTOLARYNGOLOGY

## 2023-11-01 RX ORDER — CALCIUM CARBONATE 200(500)MG
500 TABLET,CHEWABLE ORAL 2 TIMES DAILY
Status: DISCONTINUED | OUTPATIENT
Start: 2023-11-01 | End: 2023-11-02 | Stop reason: HOSPADM

## 2023-11-01 RX ORDER — ALBUTEROL SULFATE 2.5 MG/.5ML
2.5 SOLUTION RESPIRATORY (INHALATION) EVERY 6 HOURS PRN
Status: DISCONTINUED | OUTPATIENT
Start: 2023-11-01 | End: 2023-11-02 | Stop reason: HOSPADM

## 2023-11-01 RX ORDER — ALBUTEROL SULFATE 90 UG/1
2 AEROSOL, METERED RESPIRATORY (INHALATION) EVERY 6 HOURS PRN
Status: DISCONTINUED | OUTPATIENT
Start: 2023-11-01 | End: 2023-11-01

## 2023-11-01 RX ORDER — OXYCODONE AND ACETAMINOPHEN 5; 325 MG/1; MG/1
1 TABLET ORAL EVERY 4 HOURS PRN
Status: DISCONTINUED | OUTPATIENT
Start: 2023-11-01 | End: 2023-11-02

## 2023-11-01 RX ORDER — IBUPROFEN 600 MG/1
600 TABLET ORAL EVERY 6 HOURS PRN
Status: DISCONTINUED | OUTPATIENT
Start: 2023-11-01 | End: 2023-11-02 | Stop reason: HOSPADM

## 2023-11-01 RX ADMIN — BACITRACIN: 500 OINTMENT TOPICAL at 12:11

## 2023-11-01 RX ADMIN — OXYCODONE HYDROCHLORIDE AND ACETAMINOPHEN 1 TABLET: 5; 325 TABLET ORAL at 01:11

## 2023-11-01 RX ADMIN — CALCIUM CARBONATE (ANTACID) CHEW TAB 500 MG 500 MG: 500 CHEW TAB at 08:11

## 2023-11-01 RX ADMIN — OXYCODONE HYDROCHLORIDE AND ACETAMINOPHEN 1 TABLET: 5; 325 TABLET ORAL at 09:11

## 2023-11-01 RX ADMIN — ALBUTEROL SULFATE 2.5 MG: 2.5 SOLUTION RESPIRATORY (INHALATION) at 07:11

## 2023-11-01 RX ADMIN — CALCIUM CARBONATE (ANTACID) CHEW TAB 500 MG 500 MG: 500 CHEW TAB at 09:11

## 2023-11-01 RX ADMIN — HYDROMORPHONE HYDROCHLORIDE 1 MG: 1 INJECTION, SOLUTION INTRAMUSCULAR; INTRAVENOUS; SUBCUTANEOUS at 02:11

## 2023-11-01 RX ADMIN — OXYCODONE HYDROCHLORIDE AND ACETAMINOPHEN 1 TABLET: 5; 325 TABLET ORAL at 05:11

## 2023-11-01 RX ADMIN — HYDROMORPHONE HYDROCHLORIDE 1 MG: 1 INJECTION, SOLUTION INTRAMUSCULAR; INTRAVENOUS; SUBCUTANEOUS at 12:11

## 2023-11-01 RX ADMIN — CALCIUM CARBONATE (ANTACID) CHEW TAB 500 MG 1000 MG: 500 CHEW TAB at 12:11

## 2023-11-01 RX ADMIN — IBUPROFEN 600 MG: 600 TABLET ORAL at 08:11

## 2023-11-01 RX ADMIN — BACITRACIN 1 G: 500 OINTMENT TOPICAL at 08:11

## 2023-11-01 RX ADMIN — HYDROCODONE BITARTRATE AND ACETAMINOPHEN 1 TABLET: 5; 325 TABLET ORAL at 04:11

## 2023-11-01 RX ADMIN — LEVOTHYROXINE SODIUM 125 MCG: 25 TABLET ORAL at 05:11

## 2023-11-01 RX ADMIN — BACITRACIN: 500 OINTMENT TOPICAL at 10:11

## 2023-11-01 RX ADMIN — CEFAZOLIN SODIUM 2 G: 2 SOLUTION INTRAVENOUS at 02:11

## 2023-11-01 RX ADMIN — HYDROMORPHONE HYDROCHLORIDE 1 MG: 1 INJECTION, SOLUTION INTRAMUSCULAR; INTRAVENOUS; SUBCUTANEOUS at 10:11

## 2023-11-01 RX ADMIN — CEFAZOLIN SODIUM 2 G: 2 SOLUTION INTRAVENOUS at 05:11

## 2023-11-01 NOTE — PLAN OF CARE
Problem: Physical Therapy  Goal: Physical Therapy Goal  Outcome: Adequate for Care Transition   Initial PT evaluation performed today.  Pt OK for D/C home with family from PT standpoint.  No DME or skilled PT needs.  OK for OOB to chair and ambulation with Nursing or family.  PT to sign off.

## 2023-11-01 NOTE — PROGRESS NOTES
Yamel overnight  Pain not well controlled    Incision c/d/I with staples  Symmetric smile  Able to raise arms above shoulder level  Jpx3 in neck with sang /serosang material  Voice normal      Alternate narcotic with ibuprofen so able to get something every 2-3 hours   Will try switching to oxycodone to see if better control  Pt/ot consult for shoulder ROM exercises  Regular diet   Ambulate  Continue inpatient monitoring for pain control and overall postop care

## 2023-11-01 NOTE — PLAN OF CARE
Problem: Adult Inpatient Plan of Care  Goal: Plan of Care Review  Outcome: Ongoing, Progressing  Flowsheets (Taken 11/1/2023 1232)  Plan of Care Reviewed With: patient  Goal: Patient-Specific Goal (Individualized)  Outcome: Ongoing, Progressing  Goal: Absence of Hospital-Acquired Illness or Injury  Outcome: Ongoing, Progressing  Intervention: Identify and Manage Fall Risk  Flowsheets (Taken 11/1/2023 1232)  Safety Promotion/Fall Prevention:   assistive device/personal item within reach   side rails raised x 2   instructed to call staff for mobility   Fall Risk reviewed with patient/family  Intervention: Prevent Skin Injury  Flowsheets (Taken 11/1/2023 1232)  Body Position: position changed independently  Skin Protection: adhesive use limited  Intervention: Prevent Infection  Flowsheets (Taken 11/1/2023 1232)  Infection Prevention:   hand hygiene promoted   single patient room provided   rest/sleep promoted  Goal: Optimal Comfort and Wellbeing  Outcome: Ongoing, Progressing  Intervention: Monitor Pain and Promote Comfort  Flowsheets (Taken 11/1/2023 1232)  Pain Management Interventions:   care clustered   quiet environment facilitated   pain management plan reviewed with patient/caregiver  Intervention: Provide Person-Centered Care  Flowsheets (Taken 11/1/2023 1232)  Trust Relationship/Rapport:   care explained   choices provided   emotional support provided   empathic listening provided   questions answered   questions encouraged   reassurance provided   thoughts/feelings acknowledged  Goal: Readiness for Transition of Care  Outcome: Ongoing, Progressing     Problem: Infection  Goal: Absence of Infection Signs and Symptoms  Outcome: Ongoing, Progressing  Intervention: Prevent or Manage Infection  Flowsheets (Taken 11/1/2023 1232)  Infection Management: aseptic technique maintained     Problem: Skin Injury Risk Increased  Goal: Skin Health and Integrity  Outcome: Ongoing, Progressing     Problem: Fall Injury  Risk  Goal: Absence of Fall and Fall-Related Injury  Outcome: Ongoing, Progressing  Intervention: Promote Injury-Free Environment  Flowsheets (Taken 11/1/2023 1232)  Safety Promotion/Fall Prevention:   assistive device/personal item within reach   side rails raised x 2   instructed to call staff for mobility   Fall Risk reviewed with patient/family

## 2023-11-01 NOTE — NURSING
Drain 2 leaking small amount of drainage around insertion site. Site cleansed with normal saline and drain gauze in place. Pt tolerated well.

## 2023-11-01 NOTE — PT/OT/SLP EVAL
Occupational Therapy Evaluation and Treatment    Name: Michael Gonzalez  MRN: [id number]  Admitting Diagnosis: <principal problem not specified>  Recent Surgery: Procedure(s) (LRB):  THYROIDECTOMY,BILATERAL (Bilateral)  DISSECTION, NECK (Bilateral) 1 Day Post-Op    Recommendations:     Discharge Recommendations:  (TBD pending progress)  Level of Assistance Recommended: Intermittent assistance  Discharge Equipment Recommendations: none  Barriers to discharge: None    Assessment:     Michael Gonzalez is a 28 y.o. female with a medical diagnosis of <principal problem not specified>. She presents with performance deficits affecting function including weakness, impaired sensation, impaired self care skills, impaired functional mobility, gait instability, impaired balance, decreased upper extremity function, pain, decreased ROM, impaired skin, edema.     Rehab Prognosis: Good; patient would benefit from acute OT services to address these deficits and reach maximum level of function.    Plan:     Patient to be seen 5 x/week to address the above listed problems via self-care/home management, therapeutic activities, therapeutic exercises  Plan of Care Expires: 11/15/23  Plan of Care Reviewed with: patient, spouse, mother    Subjective     Chief Complaint: pain at surgical site  Patient Comments/Goals: agreeable to OT eval an to sit in the chair  Pain/Comfort:  Pain Rating 1:  (7-8/10)  Location 1:  (neck/surgical site)  Pain Addressed 1: Pre-medicate for activity, Distraction, Cessation of Activity, Nurse notified    Patients cultural, spiritual, Evangelical conflicts given the current situation: no    Social History:  Living Environment: Patient lives with their spouse in a single story home with number of outside stair(s): 1 and tub-shower combo  Prior Level of Function: Prior to admission, patient was independent  Roles and Routines: 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  Equipment Used at Home:  none  DME owned (not currently used): none  Assistance Upon Discharge: family    Objective:     Communicated with Ruby cruz prior to session. Patient found HOB elevated with peripheral IV (3 closed suction drains) upon OT entry to room.    General Precautions: Standard, fall (no lifting)   Orthopedic Precautions: N/A   Braces: N/A    Respiratory Status: Room air    Occupational Performance    Gait belt applied - Yes    Bed Mobility:   Scooting anteriorly to EOB to have both feet planted on floor: stand by assistance  Supine to sit from right side of bed with stand by assistance    Functional Mobility/Transfers:  Sit <> Stand Transfer with stand by assistance with no AD  Toilet Transfer Step Transfer technique with stand by assistance with no AD  Functional Mobility: The patient amb to the bathroom and sink with SBA/CGA, no AD and no LOB.     Activities of Daily Living: OT safety pinned 3 closed suction drains to the patients gown  Grooming: supervision and stand by assistance to wash her face and hands x2 occasions and to brush her teeth  Toileting: stand by assistance while seated on the toilet    Cognitive/Visual Perceptual:  Cognitive/Psychosocial Skills:    -     Oriented to: Person, Place, Time, Situation  -     Follows Commands/attention: Follows multistep  commands  -     Communication: clear/fluent  -     Memory: No Deficits noted  -     Safety awareness/insight to disability: intact  -     Mood/Affect/Coping skills/emotional control: Appropriate to situation  Visual/Perceptual:    -     Intact    Physical Exam:  Balance:    -     Sitting: modified independence  -     Standing: stand by assistance and contact guard assistance  Postural examination/scapula alignment:    -       No postural abnormalities identified  Skin integrity: sutures intact on B sides of neck, 3 drains in place with some drainage around the insertion site (Ruby cruz notified)  Edema:  at incision site  Sensation:    -        Impaired  patient reports tingling in B neck and B shoulders  Dominant hand: The patient is right handed and does most tasks with her right hand. The patient uses her left hand to write.  Upper Extremity Range of Motion:     -       Right Upper Extremity: WFL  -       Left Upper Extremity: WFL  Upper Extremity Strength: not formally tested but appears WFL  Fine Motor Coordination:    -       Intact    AMPAC 6 Click ADL:  AMPAC Total Score: 21    Treatment & Education:  Patient educated on role of OT, POC, and goals for therapy  The patient performed self care and functional mobility as noted above  The patient performed AROM to BUE in full ROM with slow movement with c/o mild pain and weakness. The patient was educated re: need to perform ROM to BUE throughout the day.   The patient was educated re: benefits of sitting in the chair throughout the day  Educated the patient to request assistance to amb to the bathroom for safety    Patient clear to ambulate to/from bathroom with RN/PCT, assist x1 .    Patient left up in chair with all lines intact, call button in reach, RN notified, and spouse and mother present.    GOALS:   Multidisciplinary Problems       Occupational Therapy Goals          Problem: Occupational Therapy    Goal Priority Disciplines Outcome Interventions   Occupational Therapy Goal     OT, PT/OT Ongoing, Progressing    Description: Goals to be met by: 11/15/23     Patient will increase functional independence with ADLs by performing:    UE Dressing with Modified [place].  LE Dressing with Modified [place].  Grooming while standing at sink with Modified [place].  Supine to sit with Modified [place].  Step transfer with Modified [place]  Toilet transfer to toilet with Modified [place].  Upper extremity exercise program x15 reps per handout, with independence.                         History:     Past Medical History:   Diagnosis Date    Allergy     Anxiety     Gallstones      PCOS (polycystic ovarian syndrome)        History reviewed. No pertinent surgical history.    Time Tracking:     OT Date of Treatment: 11/01/23  OT Start Time: 1150  OT Stop Time: 1219  OT Total Time (min): 29 min    Billable Minutes: Evaluation 15 (with PT) and Self Care/Home Management 14    11/1/2023

## 2023-11-01 NOTE — NURSING TRANSFER
Nursing Transfer Note      10/31/2023   2037    Nurse giving handoff:NINA Haskins   Nurse receiving handoff:DYLAN Palmer RN    Reason patient is being transferred: stable o floor    Transfer To: PACU to 430    Transfer via stretcher    Transfer with 2L to O2    Transported by Nurse nand transport    Transfer Vital Signs:  Blood Pressure:98/63  Heart Rate:88  O2:99%  Temperature:96.2  Respirations:17    Telemetry: Box Number NA, Rate NA, Rhythm NA, and Telemetry  NA  Order for Tele Monitor? No    Additional Lines: Oxygen    4eyes on Skin: yes    Medicines sent: none    Any special needs or follow-up needed: none    Patient belongings transferred with patient:  belongings with  at bedside    Chart send with patient: Yes    Notified: spouse    Patient reassessed at: 10/31/2023 @ 2040  Upon arrival to floor: patient oriented to room, call bell in reach, and bed in lowest position

## 2023-11-01 NOTE — PT/OT/SLP PROGRESS
Occupational Therapy      Patient Name:  Michael Gonzalez   MRN:  [id number]    Patient not seen today secondary to Pain (9/10 pain per nurse with meds to be given breakthrouth pain.). Will follow-up later.    11/1/2023

## 2023-11-01 NOTE — NURSING
[facility] [place]  [place]urses [place] -- 4 Eyes      11/1/2023       Skin assessed on: Q Shift      [x] No Pressure Injuries Present    []Prevention Measures Documented    [] Yes LDA  for Pressure Injury Previously documented     [] Yes New Pressure Injury Discovered   [] LDA for New Pressure Injury Added      Attending RN:  Dolly Cueto RN     Second RN:  NINA Lovett

## 2023-11-01 NOTE — PLAN OF CARE
Problem: Occupational Therapy  Goal: Occupational Therapy Goal  Description: Goals to be met by: 11/15/23     Patient will increase functional independence with ADLs by performing:    UE Dressing with Modified [place].  LE Dressing with Modified [place].  Grooming while standing at sink with Modified [place].  Supine to sit with Modified [place].  Step transfer with Modified [place]  Toilet transfer to toilet with Modified [place].  Upper extremity exercise program x15 reps per handout, with independence.    Outcome: Ongoing, Progressing   The patient was able to amb to the toilet and sink with SBA/CGA, no AD. The patient was able to perform slow, AROM to BUE with c/o weakness.   The patient will be seen in OT to address functional deficits.     The patient will benefit from a Speech Therapy eval to assess oral motor weakness.

## 2023-11-01 NOTE — PLAN OF CARE
Problem: Adult Inpatient Plan of Care  Goal: Plan of Care Review  Outcome: Ongoing, Progressing  Flowsheets (Taken 11/1/2023 0317)  Plan of Care Reviewed With:   patient   spouse

## 2023-11-01 NOTE — PT/OT/SLP EVAL
"Physical Therapy Evaluation and Discharge Note    Patient Name:  Michael Gonzalez   MRN:  [id number]    Recommendations:     Discharge Recommendations: No Therapy Indicated  Discharge Equipment Recommendations: none   Barriers to discharge:  None from PT standpoint    Assessment:     Michael Gonzalez is a 28 y.o. female admitted with a medical diagnosis of <principal problem not specified>. .  At this time, patient is functioning near their prior level and does not require further acute PT services.  OK for OOB to chair and  to ambulate in hallway with family or nursing staff    Recent Surgery: Procedure(s) (LRB):  THYROIDECTOMY,BILATERAL (Bilateral)  DISSECTION, NECK (Bilateral) 1 Day Post-Op    Plan:     During this hospitalization, patient does not require further acute PT services.  Please re-consult if situation changes.      Subjective     Chief Complaint: pain, "woozy' from pain medicine   Patient/Family Comments/goals: Pt agreeable to evaluation, states that she has bee up out of bed already with family help  Pain/Comfort:  Pain Rating 1:  (7-8/10)  Pain Addressed 1: Pre-medicate for activity, Reposition, Distraction, Cessation of Activity, Nurse notified    Patients cultural, spiritual, Druze conflicts given the current situation: no    Living Environment:  Pt lives with her spouse in a [facility] with 1 small DARCY  Prior to admission, patients level of function was Independent.  Equipment used at home: none.  DME owned (not currently used): none.  Upon discharge, patient will have assistance from Spouse and mother.    Objective:     Communicated with nsg prior to session.  Patient found sitting edge of bed with peripheral IV, SCD (3 drains from neck) upon PT entry to room.    General Precautions: Standard, fall (No heavy lifting or strenuous exercise for 6wks)    Orthopedic Precautions:N/A   Braces: N/A  Respiratory Status: Room air    Exams:  Cognitive Exam:  Patient is oriented to Person, Place, Time, and " "Situation  Gross Motor Coordination:  mildly impaired 2/2 gen weakness  Postural Exam:  Patient presented with the following abnormalities:    -       No postural abnormalities identified  Sensation:    -       Intact  light/touch B LE's  Skin Integrity/Edema:      -       Skin integrity: Sx incisions with staples intact and open to air with 3 drains  -       Edema: None noted    RLE ROM: WFL  RLE Strength: WFL  LLE ROM: WFL  LLE Strength: WFL    Functional Mobility:  Transfers:     Sit to Stand:  contact guard assistance with no AD  Gait: Pt ambulated with SBA/CGA approx 250'  Balance: Good-/Fair+    AM-PAC 6 CLICK MOBILITY  Total Score:18       Treatment and Education:  Educated pt to "call before you fall" and importance of OOB to chair and ambulating throughout the day with family or nursing staff    AM-PAC 6 CLICK MOBILITY  Total Score:18     Patient left up in chair with all lines intact and OT and family present.    GOALS:   Multidisciplinary Problems       Physical Therapy Goals          Problem: Physical Therapy    Goal Priority Disciplines Outcome Goal Variances Interventions   Physical Therapy Goal     PT, PT/OT Adequate for Care Transition                         History:     Past Medical History:   Diagnosis Date    Allergy     Anxiety     Gallstones     PCOS (polycystic ovarian syndrome)        Past Surgical History:   Procedure Laterality Date    DISSECTION OF NECK Bilateral 10/31/2023    Procedure: DISSECTION, NECK;  Surgeon: Daphne Vazquez MD;  Location: [facility] OR;  Service: ENT;  Laterality: Bilateral;  possible bilateral    THYROIDECTOMY, BILATERAL Bilateral 10/31/2023    Procedure: THYROIDECTOMY,BILATERAL;  Surgeon: Daphne Vazquez MD;  Location: [facility] OR;  Service: ENT;  Laterality: Bilateral;  NEUROMONITORING MIREILLE HAIDER [phone]  RN PREOP 10/26/2023       Time Tracking:     PT Received On: 11/01/23  PT Start Time: 1156     PT Stop Time: 1208  PT Total Time (min): 12 min "     Billable Minutes: Evaluation 12 co-evaluation with OT      11/01/2023

## 2023-11-02 VITALS
HEART RATE: 61 BPM | BODY MASS INDEX: 30.3 KG/M2 | OXYGEN SATURATION: 96 % | RESPIRATION RATE: 16 BRPM | DIASTOLIC BLOOD PRESSURE: 55 MMHG | WEIGHT: 181.88 LBS | HEIGHT: 65 IN | SYSTOLIC BLOOD PRESSURE: 116 MMHG | TEMPERATURE: 98 F

## 2023-11-02 PROCEDURE — 97110 THERAPEUTIC EXERCISES: CPT

## 2023-11-02 PROCEDURE — 25000003 PHARM REV CODE 250: Performed by: OTOLARYNGOLOGY

## 2023-11-02 RX ORDER — OXYCODONE AND ACETAMINOPHEN 5; 325 MG/1; MG/1
2 TABLET ORAL EVERY 4 HOURS PRN
Status: DISCONTINUED | OUTPATIENT
Start: 2023-11-02 | End: 2023-11-02 | Stop reason: HOSPADM

## 2023-11-02 RX ORDER — DOCUSATE SODIUM 100 MG/1
200 CAPSULE, LIQUID FILLED ORAL DAILY
Qty: 20 CAPSULE | Refills: 0 | Status: ON HOLD | OUTPATIENT
Start: 2023-11-02 | End: 2023-11-14 | Stop reason: HOSPADM

## 2023-11-02 RX ORDER — IBUPROFEN 600 MG/1
600 TABLET ORAL EVERY 6 HOURS PRN
Qty: 30 TABLET | Refills: 0 | Status: ON HOLD | OUTPATIENT
Start: 2023-11-02 | End: 2023-11-14 | Stop reason: HOSPADM

## 2023-11-02 RX ORDER — MORPHINE SULFATE 4 MG/ML
2 INJECTION, SOLUTION INTRAMUSCULAR; INTRAVENOUS EVERY 6 HOURS PRN
Status: DISCONTINUED | OUTPATIENT
Start: 2023-11-02 | End: 2023-11-02 | Stop reason: HOSPADM

## 2023-11-02 RX ORDER — CALCIUM CARBONATE 200(500)MG
500 TABLET,CHEWABLE ORAL 2 TIMES DAILY
Qty: 60 TABLET | Refills: 11 | Status: ON HOLD | OUTPATIENT
Start: 2023-11-02 | End: 2023-11-14 | Stop reason: HOSPADM

## 2023-11-02 RX ORDER — LEVOTHYROXINE SODIUM 125 UG/1
125 TABLET ORAL
Qty: 30 TABLET | Refills: 11 | Status: ON HOLD | OUTPATIENT
Start: 2023-11-03 | End: 2023-11-14 | Stop reason: HOSPADM

## 2023-11-02 RX ORDER — DOCUSATE SODIUM 100 MG/1
200 CAPSULE, LIQUID FILLED ORAL DAILY
Status: DISCONTINUED | OUTPATIENT
Start: 2023-11-02 | End: 2023-11-02 | Stop reason: HOSPADM

## 2023-11-02 RX ORDER — OXYCODONE AND ACETAMINOPHEN 5; 325 MG/1; MG/1
2 TABLET ORAL EVERY 4 HOURS PRN
Qty: 30 TABLET | Refills: 0 | Status: SHIPPED | OUTPATIENT
Start: 2023-11-02 | End: 2023-11-04 | Stop reason: ALTCHOICE

## 2023-11-02 RX ADMIN — DOCUSATE SODIUM 200 MG: 100 CAPSULE, LIQUID FILLED ORAL at 01:11

## 2023-11-02 RX ADMIN — IBUPROFEN 600 MG: 600 TABLET ORAL at 08:11

## 2023-11-02 RX ADMIN — IBUPROFEN 600 MG: 600 TABLET ORAL at 03:11

## 2023-11-02 RX ADMIN — OXYCODONE HYDROCHLORIDE AND ACETAMINOPHEN 1 TABLET: 5; 325 TABLET ORAL at 05:11

## 2023-11-02 RX ADMIN — BACITRACIN: 500 OINTMENT TOPICAL at 08:11

## 2023-11-02 RX ADMIN — CALCIUM CARBONATE (ANTACID) CHEW TAB 500 MG 500 MG: 500 CHEW TAB at 08:11

## 2023-11-02 RX ADMIN — LEVOTHYROXINE SODIUM 125 MCG: 25 TABLET ORAL at 05:11

## 2023-11-02 RX ADMIN — OXYCODONE HYDROCHLORIDE AND ACETAMINOPHEN 1 TABLET: 5; 325 TABLET ORAL at 01:11

## 2023-11-02 RX ADMIN — OXYCODONE HYDROCHLORIDE AND ACETAMINOPHEN 2 TABLET: 5; 325 TABLET ORAL at 01:11

## 2023-11-02 RX ADMIN — OXYCODONE HYDROCHLORIDE AND ACETAMINOPHEN 2 TABLET: 5; 325 TABLET ORAL at 09:11

## 2023-11-02 NOTE — ANESTHESIA POSTPROCEDURE EVALUATION
Anesthesia Post Evaluation    Patient: Michael Gonzalez    Procedure(s) Performed: Procedure(s) (LRB):  THYROIDECTOMY,BILATERAL (Bilateral)  DISSECTION, NECK (Bilateral)    Final Anesthesia Type: general      Patient location during evaluation: PACU  Patient participation: Yes- Able to Participate  Level of consciousness: awake and alert and oriented  Post-procedure vital signs: reviewed and stable  Pain management: adequate  Airway patency: patent    PONV status at discharge: No PONV  Anesthetic complications: no      Cardiovascular status: blood pressure returned to baseline, hemodynamically stable and stable  Respiratory status: unassisted, spontaneous ventilation and room air  Hydration status: euvolemic  Follow-up not needed.          Vitals Value Taken Time   /55 11/02/23 1100   Temp 36.8 °C (98.3 °F) 11/02/23 1100   Pulse 61 11/02/23 1100   Resp 18 11/02/23 1100   SpO2 96 % 11/02/23 1100         Event Time   Out of Recovery 20:06:27         Pain/Jan Score: Pain Rating Prior to Med Admin: 10 (11/2/2023  9:33 AM)  Pain Rating Post Med Admin: 5 (11/2/2023 10:23 AM)

## 2023-11-02 NOTE — PT/OT/SLP PROGRESS
Occupational Therapy   Treatment    Name: Michael Gonzalez  MRN: [id number]  Admitting Diagnosis:  <principal problem not specified>  2 Days Post-Op    Recommendations:     Discharge Recommendations: No Therapy Indicated  Discharge Equipment Recommendations:  none  Barriers to discharge:  None    Assessment:     Michael Gonzalez is a 28 y.o. female with a medical diagnosis of <principal problem not specified>.  Performance deficits affecting function are weakness, decreased upper extremity function, pain, impaired skin, edema, impaired sensation.   The patient reports amb to the bathroom and in the hallway with family assist/(S). The patient and family were educated re: AROM/AAROM to BUE. The patient c/o B shoulder weakness and decreased sensation. Distal muscle strength is WFL in BUE.   The patient was instructed to perform AROM to BUE as tolerated and to increase activity as tolerated. The patient verbalized understanding and stated she will have assistance as needed at home.    Rehab Prognosis:  Good; patient would benefit from acute skilled OT services to address these deficits and reach maximum level of function.       Plan:     Patient to be seen 5 x/week to address the above listed problems via self-care/home management, therapeutic exercises, therapeutic activities  Plan of Care Expires: 11/15/23  Plan of Care Reviewed with: patient, family    Subjective     Chief Complaint: drowsy 2* receiving pain meds  Patient/Family Comments/goals: possible D/C to home today  Pain/Comfort:  Pain Rating 1:  (yes-did not rate)  Location 1: neck  Pain Addressed 1: Pre-medicate for activity, Cessation of Activity, Distraction    Objective:     Communicated with: nurse prior to session.  Patient found HOB elevated with peripheral IV (3 closed suction drain in neck) upon OT entry to room.    General Precautions: Standard, fall (no strenuous lifting or execise x6 weeks)    Orthopedic Precautions:N/A  Braces: N/A  Respiratory Status:  Room air     Occupational Performance:     Bed Mobility:    N/T     Functional Mobility/Transfers:  Functional Mobility: The patient declined OOB activities 2* feeling drowsy after receiving pain meds    Activities of Daily Living:  N/T      [facility] 6 Click ADL: 21    Treatment & Education:  The patient participated in AAROM to B shoulders and elbow. The patient c/o decreased sensation in B shoulder. The patient reports ability to feel some touch but feels numb to herself.  OT educated the patient and verbally re: need to sit in the chair and reposition frequently. The patient reports having back pain 2* staying the bed.    Patient left HOB elevated with all lines intact, call button in reach, and family present    GOALS:   Multidisciplinary Problems       Occupational Therapy Goals          Problem: Occupational Therapy    Goal Priority Disciplines Outcome Interventions   Occupational Therapy Goal     OT, PT/OT Adequate for Care Transition    Description: Goals to be met by: 11/15/23     Patient will increase functional independence with ADLs by performing:    UE Dressing with Modified [place].  LE Dressing with Modified [place].  Grooming while standing at sink with Modified [place].  Supine to sit with Modified [place].  Step transfer with Modified [place]  Toilet transfer to toilet with Modified [place].  Upper extremity exercise program x15 reps per handout, with independence.                         Time Tracking:     OT Date of Treatment: 11/02/23  OT Start Time: 1431  OT Stop Time: 1445  OT Total Time (min): 14 min    Billable Minutes:Therapeutic Exercise 14    OT/CINDY: OT          11/2/2023

## 2023-11-02 NOTE — NURSING
Incision to neck cleansed with normal saline, patted dry. Bacitracin ointment applied and left CINDY.

## 2023-11-02 NOTE — PLAN OF CARE
11/02/23 1255   Final Note   Assessment Type Final Discharge Note   Anticipated Discharge Disposition Home   What phone number can be called within the next 1-3 days to see how you are doing after discharge? [phone]   Hospital Resources/Appts/Education Provided Provided patient/caregiver with written discharge plan information;Appointments scheduled and added to AVS   Post-Acute Status   Discharge Delays None known at this time     Patient to discharge on today. Nurse Gómez Soria informed that patient is clear for discharge from case management standpoint.

## 2023-11-02 NOTE — PLAN OF CARE
Problem: Adult Inpatient Plan of Care  Goal: Plan of Care Review  Outcome: Adequate for Care Transition  Goal: Patient-Specific Goal (Individualized)  Outcome: Adequate for Care Transition  Goal: Absence of Hospital-Acquired Illness or Injury  Outcome: Adequate for Care Transition  Goal: Optimal Comfort and Wellbeing  Outcome: Adequate for Care Transition  Goal: Readiness for Transition of Care  Outcome: Adequate for Care Transition     Problem: Infection  Goal: Absence of Infection Signs and Symptoms  Outcome: Adequate for Care Transition     Problem: Skin Injury Risk Increased  Goal: Skin Health and Integrity  Outcome: Adequate for Care Transition     Problem: Fall Injury Risk  Goal: Absence of Fall and Fall-Related Injury  Outcome: Adequate for Care Transition

## 2023-11-02 NOTE — NURSING
[facility] [place]  Nurses Note -- 4 Eyes      11/2/2023       Skin assessed on: Q Shift      [x] No Pressure Injuries Present    []Prevention Measures Documented    [] Yes LDA  for Pressure Injury Previously documented     [] Yes New Pressure Injury Discovered   [] LDA for New Pressure Injury Added      Attending RN:  Dolly Cueto RN     Second RN:  MORGAN Holland

## 2023-11-02 NOTE — NURSING
AVS virtually reviewed with patient and her sister in its entirety with emphasis on medications, follow-up appointments and reasons to return to the ED or contact the [facility] On Call Nurse Care Line. Patient also encouraged to utilize their patient portal. Ease and convenience of use reiterated. Education complete and patient voiced understanding. All questions answered. Discharge teaching complete.

## 2023-11-02 NOTE — NURSING
Pt discharged per MD order. IV's removed. Catheter tip intact. Pt discharged with HARRIETT drains per MD order. Drains intact. Incision to neck with staples intact. Site free of complications. AVS given to patient, discharge instructions explained to patient and spouse per discharge nurse. Pt verbalized understanding. VSS. Afebrile. No complaints of pain, N/V, diarrhea, or SOB. Prescriptions sent up by pharmacy and given to patient. Pt left with belongings to Main Entrance via wheelchair per staff. Family with patient.  No distress noted.

## 2023-11-02 NOTE — PLAN OF CARE
Problem: Occupational Therapy  Goal: Occupational Therapy Goal  Description: Goals to be met by: 11/15/23     Patient will increase functional independence with ADLs by performing:    UE Dressing with Modified [place].  LE Dressing with Modified [place].  Grooming while standing at sink with Modified [place].  Supine to sit with Modified [place].  Step transfer with Modified [place]  Toilet transfer to toilet with Modified [place].  Upper extremity exercise program x15 reps per handout, with independence.    Outcome: Adequate for Care Transition   The patient reports amb to the bathroom and in the hallway with family assist/(S). The patient and family were educated re: AROM/AAROM to BUE. The patient c/o B shoulder weakness and decreased sensation. Distal muscle strength is WFL in BUE.   The patient was instructed to perform AROM to BUE as tolerated and to increase activity as tolerated. The patient verbalized understanding and stated she will have assistance as needed at home.

## 2023-11-03 ENCOUNTER — OFFICE VISIT (OUTPATIENT)
Dept: OTOLARYNGOLOGY | Facility: CLINIC | Age: 28
End: 2023-11-03
Payer: MEDICAID

## 2023-11-03 VITALS
WEIGHT: 181 LBS | HEIGHT: 65 IN | SYSTOLIC BLOOD PRESSURE: 132 MMHG | BODY MASS INDEX: 30.16 KG/M2 | DIASTOLIC BLOOD PRESSURE: 68 MMHG

## 2023-11-03 DIAGNOSIS — C80.1 PAPILLARY CARCINOMA: Primary | ICD-10-CM

## 2023-11-03 DIAGNOSIS — R29.898 SHOULDER WEAKNESS: ICD-10-CM

## 2023-11-03 PROCEDURE — 3078F PR MOST RECENT DIASTOLIC BLOOD PRESSURE < 80 MM HG: ICD-10-PCS | Mod: CPTII,S$GLB,, | Performed by: OTOLARYNGOLOGY

## 2023-11-03 PROCEDURE — 99024 PR POST-OP FOLLOW-UP VISIT: ICD-10-PCS | Mod: S$GLB,,, | Performed by: OTOLARYNGOLOGY

## 2023-11-03 PROCEDURE — 3078F DIAST BP <80 MM HG: CPT | Mod: CPTII,S$GLB,, | Performed by: OTOLARYNGOLOGY

## 2023-11-03 PROCEDURE — 3075F PR MOST RECENT SYSTOLIC BLOOD PRESS GE 130-139MM HG: ICD-10-PCS | Mod: CPTII,S$GLB,, | Performed by: OTOLARYNGOLOGY

## 2023-11-03 PROCEDURE — 99024 POSTOP FOLLOW-UP VISIT: CPT | Mod: S$GLB,,, | Performed by: OTOLARYNGOLOGY

## 2023-11-03 PROCEDURE — 3075F SYST BP GE 130 - 139MM HG: CPT | Mod: CPTII,S$GLB,, | Performed by: OTOLARYNGOLOGY

## 2023-11-03 NOTE — DISCHARGE SUMMARY
[facility]  Otorhinolaryngology-Head & Neck Surgery  Discharge Summary      Patient Name: Michael Gonzalez  MRN: [id number]  Admission Date: 10/31/2023  Hospital Length of Stay: 0 days  Discharge Date and Time: 11/2/2023  5:19 PM  Attending Physician: No att. providers found   Discharging Provider: Daphne Vazquez MD  Primary Care Provider: Christos Land MD     HPI: metastatic papillary thyroid cancer    Procedure(s) (LRB):  THYROIDECTOMY,BILATERAL (Bilateral)  DISSECTION, NECK (Bilateral)     Hospital Course: admitted postop after surgery. Required iv pain meds for pain control but able to wean down to PO regimen. Tolerating meals, passing gas and ambulating. Deemed stable for discharge     Consults: none    Significant Diagnostic Studies:     Pending Diagnostic Studies:       Procedure Component Value Units Date/Time    Specimen to Pathology, Surgery ENT [[id number]] Collected: 10/31/23 1814    Order Status: Sent Lab Status: In process Updated: 11/01/23 1034    Specimen: Tissue           There are no hospital problems to display for this patient.     Discharged Condition: good    Disposition: Home or Self Care    Follow Up:   Follow-up Information       Daphne Vazquez MD Follow up on 11/3/2023.    Specialty: Otolaryngology  Why: at 1:15 PM  Contact information:  [address] [place] [address] [phone]               Christos Land MD. Schedule an appointment as soon as possible for a visit in 7 day(s).    Specialty: Family Medicine  Why: Camron and make a follow up appointment with your primary care doctor within the next 7 days  Contact information:  [address] [place] [address] [phone]                           Patient Instructions:      Diet Adult Regular     Lifting restrictions   Order Comments: No lifting over 5 pounds     No dressing needed   Order Comments: Can wear dressing as needed if drainage around HARRIETT drain. Use provided antibiotic ointment along incision line twice  daily for 3 days and then after 3 days use vaseline along incision line twice daily     Medications:  Reconciled Home Medications:      Medication List        START taking these medications      CALCIUM ANTACID 200 mg calcium (500 mg) chewable tablet  Generic drug: calcium carbonate  Take 1 tablet (500 mg total) by mouth 2 (two) times daily.     docusate sodium 100 MG capsule  Commonly known as: COLACE  Take 2 capsules (200 mg total) by mouth once daily.     ibuprofen 600 MG tablet  Commonly known as: ADVIL,MOTRIN  Take 1 tablet (600 mg total) by mouth every 6 (six) hours as needed for Pain.     levothyroxine 125 MCG tablet  Commonly known as: SYNTHROID  Take 1 tablet (125 mcg total) by mouth before breakfast.     oxyCODONE-acetaminophen 5-325 mg per tablet  Commonly known as: PERCOCET  Take 2 tablets by mouth every 4 (four) hours as needed for Pain.            CONTINUE taking these medications      azelastine 137 mcg (0.1 %) nasal spray  Commonly known as: ASTELIN  1 spray (137 mcg total) by Nasal route 2 (two) times daily.     drospirenone-ethinyl estradioL 3-0.02 mg per tablet  Commonly known as: YOLY  Take 1 tablet by mouth once daily.     fluticasone propionate 50 mcg/actuation nasal spray  Commonly known as: FLONASE  2 sprays (100 mcg total) by Each Nostril route 2 (two) times daily.            STOP taking these medications      acetaminophen 500 MG tablet  Commonly known as: TYLENOL              Daphne Vazquez MD  Otorhinolaryngology-Head & Neck Surgery  [facility]

## 2023-11-03 NOTE — PROGRESS NOTES
ENT POSTOP CLINIC NOTE    SUBJECTIVE:   pt here for postop visit s/p total thyroidectomy with bilateral 2-5 neck dissection, central neck dissection on 10-31 -23. Has been having problems with swallowing    OBJECTIVE:  General: no acute distress  Head: normocephalic  Neck:incision c/d/I with staples HARRIETT drain with serosang minimal fluid  Respiratory: nonlabored , no stridor or stertor    IMPRESSION:  s/p total thyroidectomy with bilateral 2-5 neck dissection, central neck dissection on 10-31 -23.    PLAN:  All drains removed  Flex scope today with normal vocal fold motion   Physical therapy   SLP if still with swallowing issues next week  Path pending  Follow up next week for staple removal

## 2023-11-04 ENCOUNTER — NURSE TRIAGE (OUTPATIENT)
Dept: ADMINISTRATIVE | Facility: CLINIC | Age: 28
End: 2023-11-04
Payer: MEDICAID

## 2023-11-04 RX ORDER — OXYCODONE HYDROCHLORIDE 5 MG/1
5 TABLET ORAL EVERY 4 HOURS PRN
Qty: 15 TABLET | Refills: 0 | Status: SHIPPED | OUTPATIENT
Start: 2023-11-04 | End: 2023-11-06

## 2023-11-04 RX ORDER — NALOXONE HYDROCHLORIDE 4 MG/.1ML
SPRAY NASAL
Qty: 1 EACH | Refills: 11 | Status: ON HOLD | OUTPATIENT
Start: 2023-11-04 | End: 2023-11-14 | Stop reason: HOSPADM

## 2023-11-04 NOTE — TELEPHONE ENCOUNTER
Reason for Disposition   [1] Prescription refill request for ESSENTIAL medicine (i.e., likelihood of harm to patient if not taken) AND [2] triager unable to refill per department policy     10/31 recent post-op patient requesting refill of pain medication.    Protocols used: Medication Refill and Renewal Call-A-AH    Michael CHADWICK's mom, Liz Gonzalez, called to say she had bilateral thyroidectomy 10/31/2023, Dr Vazquez.   She states Michael will be out of pain medication at 0400 tomorrow morning.  She still requires it every four hours, as was ordered. She is taking Percocet 5-325 mg, two tablets every 4 hours, she said.  Called Dr Ayleen Holm, on call for [facility] (no one on call for ) and provided above information to her.  Dr Holm wishes to speak with patient / mom.  Dr Holm was connected to Liz and Michael with phone transfer to patient's phone # [phone].  Liz Gonzalez called back stating the call was dropped.  Secure chat message to Dr Holm with request to try to reach patient.  Attempted to call both numbers for Michael, but went to recording.  Dr Holm called me back to inform that she has ordered oxycodone 5 mg immediate release tablet, for her to use after she finishes the current order of Percocet.  MD instructs her to take one oxycodone 5 mg tablet every 4 hours as needed for pain, and to continue with the ibuprofen every 6 hours for pain, as well, to alternate with the oxycodone.  Since the oxycodone does not have tylenol, she can take tylenol with the oxycodone, but follow instructions on tylenol package to avoid taking more than recommended daily dose of tylenol.  Dr Holm states she has also ordered Narcan for her to have on hand if needed for excessive sleepiness while taking oxycodone.  Called Liz back, several times, and had to leave message with above information and MD instructions for this medication on Liz's , with instructions to call back with any additional questions /  concerns.  Message to Dr Holm and Dr Vazquez.  Please contact caller directly with any additional care advice.

## 2023-11-06 ENCOUNTER — TELEPHONE (OUTPATIENT)
Dept: OTOLARYNGOLOGY | Facility: CLINIC | Age: 28
End: 2023-11-06
Payer: MEDICAID

## 2023-11-06 RX ORDER — OXYCODONE AND ACETAMINOPHEN 5; 325 MG/1; MG/1
1 TABLET ORAL EVERY 4 HOURS PRN
Qty: 20 TABLET | Refills: 0 | Status: ON HOLD | OUTPATIENT
Start: 2023-11-06 | End: 2023-11-14 | Stop reason: HOSPADM

## 2023-11-06 NOTE — TELEPHONE ENCOUNTER
----- Message from Ancelmo Iqbal sent at 11/4/2023 11:07 AM CDT -----  Contact: patient  Type:  RX Refill Request    Who Called: patient   Refill or New Rx:refill   RX Name and Strength:oxyCODONE-acetaminophen (PERCOCET) 5-325 mg per tablet  How is the patient currently taking it? (ex. 1XDay):Sig - Route: Take 2 tablets by mouth every 4 (four) hours as needed for Pain. - Oral  Is this a 30 day or 90 day RX:30  Preferred Pharmacy with phone number:[id number] - [place] [address] [facility] [address] [facility]   Phone: [phone]  Local or Mail Order:local   Ordering Provider:George  Would the patient rather a call back or a response via [facility]? Call back   Best Call Back Number:[phone]  Additional Information: please assist

## 2023-11-06 NOTE — TELEPHONE ENCOUNTER
Spoke with pt stated ran out of pain med, call the on call nurse, they was telling her she had surgery 2 weeks ago and she had to tell them that wasn't true, but they called out oxycodone instant relief with some narcan, pt stated when they did that she was scared to fill that med, so didn't, states she just wanted to wait to get the same med Dr. Vazquez rxd,  informed her I would let Dr. Vazquez know, verb understanding, informed pt has appt tomorrow asked her to arrive 30 mins earlier, stated she was fine with that.

## 2023-11-06 NOTE — TELEPHONE ENCOUNTER
----- Message from Cr Kenney sent at 11/6/2023 11:03 AM CST -----  Regarding: Mae with [facility]  Name of Caller: Mae with [facility]     Pharmacy Name: [facility]     Prescription Name: oxyCODONE-acetaminophen (PERCOCET) 5-325 mg per tablet    What do they need to clarify? Needs diagnosis code     Can you be contacted via [facility]? No     Best Call Back Number: .  [facility] #[id number] [address] AT [address] [address]  Phone: [phone] Fax: [phone]        Additional Information

## 2023-11-07 ENCOUNTER — OFFICE VISIT (OUTPATIENT)
Dept: OTOLARYNGOLOGY | Facility: CLINIC | Age: 28
End: 2023-11-07
Payer: MEDICAID

## 2023-11-07 ENCOUNTER — PATIENT MESSAGE (OUTPATIENT)
Dept: OTOLARYNGOLOGY | Facility: CLINIC | Age: 28
End: 2023-11-07

## 2023-11-07 VITALS — BODY MASS INDEX: 30.12 KG/M2 | WEIGHT: 181 LBS | SYSTOLIC BLOOD PRESSURE: 136 MMHG | DIASTOLIC BLOOD PRESSURE: 72 MMHG

## 2023-11-07 DIAGNOSIS — R13.14 PHARYNGOESOPHAGEAL DYSPHAGIA: Primary | ICD-10-CM

## 2023-11-07 LAB
FINAL PATHOLOGIC DIAGNOSIS: NORMAL
GROSS: NORMAL
Lab: NORMAL

## 2023-11-07 PROCEDURE — 3078F PR MOST RECENT DIASTOLIC BLOOD PRESSURE < 80 MM HG: ICD-10-PCS | Mod: CPTII,S$GLB,, | Performed by: OTOLARYNGOLOGY

## 2023-11-07 PROCEDURE — 99024 POSTOP FOLLOW-UP VISIT: CPT | Mod: S$GLB,,, | Performed by: OTOLARYNGOLOGY

## 2023-11-07 PROCEDURE — 1159F PR MEDICATION LIST DOCUMENTED IN MEDICAL RECORD: ICD-10-PCS | Mod: CPTII,S$GLB,, | Performed by: OTOLARYNGOLOGY

## 2023-11-07 PROCEDURE — 3078F DIAST BP <80 MM HG: CPT | Mod: CPTII,S$GLB,, | Performed by: OTOLARYNGOLOGY

## 2023-11-07 PROCEDURE — 1159F MED LIST DOCD IN RCRD: CPT | Mod: CPTII,S$GLB,, | Performed by: OTOLARYNGOLOGY

## 2023-11-07 PROCEDURE — 3075F PR MOST RECENT SYSTOLIC BLOOD PRESS GE 130-139MM HG: ICD-10-PCS | Mod: CPTII,S$GLB,, | Performed by: OTOLARYNGOLOGY

## 2023-11-07 PROCEDURE — 3075F SYST BP GE 130 - 139MM HG: CPT | Mod: CPTII,S$GLB,, | Performed by: OTOLARYNGOLOGY

## 2023-11-07 PROCEDURE — 99024 PR POST-OP FOLLOW-UP VISIT: ICD-10-PCS | Mod: S$GLB,,, | Performed by: OTOLARYNGOLOGY

## 2023-11-07 RX ORDER — CYCLOBENZAPRINE HCL 5 MG
5 TABLET ORAL 3 TIMES DAILY PRN
Qty: 30 TABLET | Refills: 0 | Status: SHIPPED | OUTPATIENT
Start: 2023-11-07

## 2023-11-07 NOTE — PATIENT INSTRUCTIONS
Clearing your throat leads to more swelling in the voice box.  This will worsen your symptoms.  You should try to minimize throat clearing as much as possible.    Can try gaviscon liquid over the counter - take 15 minutes after a meal as needed for throat phlegm    Avoid mouthwash with alcohol such as listerine. You should use biotene mouth wash    Can sleep with bedside humidifier     You should aim to drink 2 to 3 liters of water daily if you are drinking one cup of coffee.  If you have trouble drinking water, you can cut back or cut out caffeine. ouble drinking water, you can cut back or cut out caffeine.    Lozenges:  Halls Breezers - sold with cough drops, Can try sugar free lozenges with pectin ,  such as halls fruit breezers    Xylimelts, on toothpaste aisle, these are convenient for when you are talking and or sleeping - they stick to gumline and provide moisture - Innoverne or [employer].        Steam and gargle - 3x day  You may consider getting a facial steamer, breathe in warm moist air  through mouth and nose to hydrate vocal folds. On amazon, I like the [employer]air version that is under $25.        Gargle:   1/2 tsp each salt, baking soda, clear corn syrup, 6 oz warm water  Sip, gargle quietly, spit, repeat until gone, don't eat/drink for 30 minutes after.      Chew gum with baking soda after meals, Orbit White     Order online, when it's time to get more Gaviscon, either Alginate therapy such as Reflux Gourmet  or Gaviscon Advance can be used following meals and at bedtime for reflux coverage. The Acid Watcher Diet by Dr Anguiano as well as the Chronic Cough Waldo by Dr Allen are good reads to gain improved understanding of dietary and behavioral changes that can aid in reduction of LPRD, and to better understand cough and cough control     www.[email or web address].com

## 2023-11-08 ENCOUNTER — OFFICE VISIT (OUTPATIENT)
Dept: OTOLARYNGOLOGY | Facility: CLINIC | Age: 28
End: 2023-11-08
Payer: MEDICAID

## 2023-11-08 ENCOUNTER — PATIENT MESSAGE (OUTPATIENT)
Dept: OTOLARYNGOLOGY | Facility: CLINIC | Age: 28
End: 2023-11-08

## 2023-11-08 ENCOUNTER — TELEPHONE (OUTPATIENT)
Dept: SPEECH THERAPY | Facility: HOSPITAL | Age: 28
End: 2023-11-08
Payer: MEDICAID

## 2023-11-08 DIAGNOSIS — R22.1 NECK SWELLING: Primary | ICD-10-CM

## 2023-11-08 PROCEDURE — 99024 PR POST-OP FOLLOW-UP VISIT: ICD-10-PCS | Mod: S$GLB,,, | Performed by: OTOLARYNGOLOGY

## 2023-11-08 PROCEDURE — 99024 POSTOP FOLLOW-UP VISIT: CPT | Mod: S$GLB,,, | Performed by: OTOLARYNGOLOGY

## 2023-11-08 RX ORDER — FLUCONAZOLE 150 MG/1
150 TABLET ORAL DAILY
Qty: 1 TABLET | Refills: 0 | Status: SHIPPED | OUTPATIENT
Start: 2023-11-08 | End: 2023-11-09

## 2023-11-08 RX ORDER — BENZONATATE 200 MG/1
200 CAPSULE ORAL 3 TIMES DAILY PRN
Qty: 30 CAPSULE | Refills: 1 | Status: ON HOLD | OUTPATIENT
Start: 2023-11-08 | End: 2023-11-14 | Stop reason: HOSPADM

## 2023-11-08 RX ORDER — AMOXICILLIN AND CLAVULANATE POTASSIUM 875; 125 MG/1; MG/1
1 TABLET, FILM COATED ORAL 2 TIMES DAILY
Qty: 28 TABLET | Refills: 0 | Status: ON HOLD | OUTPATIENT
Start: 2023-11-08 | End: 2023-11-14 | Stop reason: HOSPADM

## 2023-11-08 RX ORDER — PROMETHAZINE HYDROCHLORIDE AND CODEINE PHOSPHATE 6.25; 1 MG/5ML; MG/5ML
5 SOLUTION ORAL EVERY 6 HOURS PRN
Qty: 240 ML | Refills: 0 | Status: ON HOLD | OUTPATIENT
Start: 2023-11-08 | End: 2023-11-14 | Stop reason: HOSPADM

## 2023-11-08 NOTE — PROGRESS NOTES
Had a lot of coughing spells yesterday and kept coughing more and more and pain increased     Has some swelling of the left neck today and it is tender     Swelling of left neck - worse than exam yesterday with some erythema and tenderness    Discussed about possible chyle leak. Suspect getting infection   Augmentin prescribed.   Discussed ct scan can be misleading so would like to try abx first and if not resolving will get scan  Cough meds given to help stop coughing. Counseled not to take with oxycodone  F/u friday

## 2023-11-10 ENCOUNTER — CLINICAL SUPPORT (OUTPATIENT)
Dept: REHABILITATION | Facility: HOSPITAL | Age: 28
End: 2023-11-10
Attending: OTOLARYNGOLOGY
Payer: MEDICAID

## 2023-11-10 ENCOUNTER — OFFICE VISIT (OUTPATIENT)
Dept: OTOLARYNGOLOGY | Facility: CLINIC | Age: 28
End: 2023-11-10
Payer: MEDICAID

## 2023-11-10 VITALS
DIASTOLIC BLOOD PRESSURE: 88 MMHG | HEIGHT: 65 IN | SYSTOLIC BLOOD PRESSURE: 130 MMHG | WEIGHT: 181 LBS | BODY MASS INDEX: 30.16 KG/M2

## 2023-11-10 DIAGNOSIS — G89.29 CHRONIC PAIN OF BOTH SHOULDERS: ICD-10-CM

## 2023-11-10 DIAGNOSIS — M25.512 CHRONIC PAIN OF BOTH SHOULDERS: ICD-10-CM

## 2023-11-10 DIAGNOSIS — R22.1 NECK SWELLING: Primary | ICD-10-CM

## 2023-11-10 DIAGNOSIS — R29.898 SHOULDER WEAKNESS: ICD-10-CM

## 2023-11-10 DIAGNOSIS — M25.611 DECREASED RIGHT SHOULDER RANGE OF MOTION: ICD-10-CM

## 2023-11-10 DIAGNOSIS — R29.898 WEAKNESS OF SHOULDER: ICD-10-CM

## 2023-11-10 DIAGNOSIS — M25.511 CHRONIC PAIN OF BOTH SHOULDERS: ICD-10-CM

## 2023-11-10 DIAGNOSIS — C80.1 PAPILLARY CARCINOMA: ICD-10-CM

## 2023-11-10 DIAGNOSIS — R68.89 DECREASED STRENGTH, ENDURANCE, AND MOBILITY: ICD-10-CM

## 2023-11-10 DIAGNOSIS — M25.612 DECREASED RANGE OF MOTION OF LEFT SHOULDER: ICD-10-CM

## 2023-11-10 DIAGNOSIS — Z74.09 DECREASED STRENGTH, ENDURANCE, AND MOBILITY: ICD-10-CM

## 2023-11-10 DIAGNOSIS — R53.1 DECREASED STRENGTH, ENDURANCE, AND MOBILITY: ICD-10-CM

## 2023-11-10 PROCEDURE — 3075F PR MOST RECENT SYSTOLIC BLOOD PRESS GE 130-139MM HG: ICD-10-PCS | Mod: CPTII,S$GLB,, | Performed by: OTOLARYNGOLOGY

## 2023-11-10 PROCEDURE — 97110 THERAPEUTIC EXERCISES: CPT | Mod: PN

## 2023-11-10 PROCEDURE — 3079F PR MOST RECENT DIASTOLIC BLOOD PRESSURE 80-89 MM HG: ICD-10-PCS | Mod: CPTII,S$GLB,, | Performed by: OTOLARYNGOLOGY

## 2023-11-10 PROCEDURE — 99024 POSTOP FOLLOW-UP VISIT: CPT | Mod: S$GLB,,, | Performed by: OTOLARYNGOLOGY

## 2023-11-10 PROCEDURE — 3079F DIAST BP 80-89 MM HG: CPT | Mod: CPTII,S$GLB,, | Performed by: OTOLARYNGOLOGY

## 2023-11-10 PROCEDURE — 3075F SYST BP GE 130 - 139MM HG: CPT | Mod: CPTII,S$GLB,, | Performed by: OTOLARYNGOLOGY

## 2023-11-10 PROCEDURE — 97161 PT EVAL LOW COMPLEX 20 MIN: CPT | Mod: PN

## 2023-11-10 PROCEDURE — 99024 PR POST-OP FOLLOW-UP VISIT: ICD-10-PCS | Mod: S$GLB,,, | Performed by: OTOLARYNGOLOGY

## 2023-11-10 RX ORDER — OCTREOTIDE ACETATE 50 UG/ML
50 INJECTION, SOLUTION INTRAVENOUS; SUBCUTANEOUS EVERY 12 HOURS
Qty: 28 ML | Refills: 0 | Status: ON HOLD | OUTPATIENT
Start: 2023-11-10 | End: 2023-11-14 | Stop reason: HOSPADM

## 2023-11-10 RX ORDER — OCTREOTIDE ACETATE 50 UG/ML
50 INJECTION, SOLUTION INTRAVENOUS; SUBCUTANEOUS EVERY 12 HOURS
Qty: 20 ML | Refills: 0 | Status: ON HOLD | OUTPATIENT
Start: 2023-11-10 | End: 2023-11-14 | Stop reason: HOSPADM

## 2023-11-10 RX ORDER — OCTREOTIDE ACETATE 50 UG/ML
50 INJECTION, SOLUTION INTRAVENOUS; SUBCUTANEOUS EVERY 12 HOURS
Qty: 28 ML | Refills: 0 | Status: SHIPPED | OUTPATIENT
Start: 2023-11-10 | End: 2023-11-10

## 2023-11-10 RX ORDER — OCTREOTIDE ACETATE 50 UG/ML
50 INJECTION, SOLUTION INTRAVENOUS; SUBCUTANEOUS EVERY 12 HOURS
Qty: 28 ML | Refills: 0 | Status: SHIPPED | OUTPATIENT
Start: 2023-11-10 | End: 2023-11-10 | Stop reason: SDUPTHER

## 2023-11-10 NOTE — PROGRESS NOTES
Neck swelling is worsening since 2 days ago. Feels a lot of pressure in the neck  Coughing has been better on tessalon pearles and other meds I gave her. Taking colace      Left lateral neck with swelling and slightly tender. Not a lot of erythema but definite significant enlargement from exam on the 8th  No stridor, no stertor. Voice normal .  No resp distress/retractions    I am concerned about chyle leak. Will place pressure dressing, rx octrteotide with MCFA diet ( info on this printed out for patient) and reach out to her over weekend. Offered to admit to hospital , she would prefer outpatient if possible so will try to manage outpatient but will be in contact with her tomorrow as may need admission and/or potential surgery Monday.   Continue abx  Continue antitussives  No straining

## 2023-11-10 NOTE — PLAN OF CARE
[facility]  Physical Therapy Initial Evaluation    Date: 11/10/2023   Name: Michael Gonzalez  Clinic Number: [id number]    Therapy Diagnosis:   Encounter Diagnoses   Name Primary?    Papillary carcinoma     Shoulder weakness     Chronic pain of both shoulders     Weakness of shoulder     Decreased right shoulder range of motion     Decreased range of motion of left shoulder     Decreased strength, endurance, and mobility      Physician: Daphne Vazquez MD    Physician Orders: PT Eval and Treat   Medical Diagnosis from Referral:   C80.1 (ICD-10-CM) - Papillary carcinoma   R29.898 (ICD-10-CM) - Shoulder weakness     Evaluation Date: 11/10/2023  Authorization Period Expiration: 12-31-23  Plan of Care Expiration: 2-10-24  Visit # / Visits authorized: 1/ 20    Progress Note Due: 12-10-23  FOTO: 1/1    Precautions: Standard    Time In: 7:00 am  Time Out: 7:45 am  Total Appointment Time (timed & untimed codes): 45  minutes    Subjective   Date of surgery: 10-31-23  History of current condition - Michael reports: that she had cancer in her thyroid and lymph nodes  noted. Pt states after surgery she has weakness of B shoulders, but R shoulder is weaker than racheal. She states pain is in the R shoulder blade. She still have numbness of R anterior and lateral shoulder. She has L shoulder pain. She states she has good strength of her hands and elbows. She states she has problem to drive, perform ADL's and IADL's, house shores. Pt states she has liftign restricting ( no more than 2# to 5#)     Any popping or clicking or locking: no   Any difficult to elevate shoulder flexion, abd, ER, or IR: yes   Does pain radiates: no   Pain constant or intermittent:Constant   Has done any injections:  No     Pain:  Current 7/10, worst 9/10, best 7/10   Location: bilateral shoulders  ( R worse than L)   Description: tightness and burning   Aggravating Factors: drive, perofrm ADL's and IADL's, house shores.  Easing  Factors: ibuprofen and tylenol     Prior Therapy: yes   Social History:  lives with their family  Occupation: Clean house.   Prior Level of Function: Independent   Current Level of Function: Independent     Pts goals: she want to have strength back and range of motion     Imaging, none:      Medical History:   Past Medical History:   Diagnosis Date    Allergy     Anxiety     Gallstones     PCOS (polycystic ovarian syndrome)        Surgical History:   Michael Gonzalez  has a past surgical history that includes thyroidectomy, bilateral (Bilateral, 10/31/2023) and Dissection of neck (Bilateral, 10/31/2023).    Medications:   Michael CHADWICK has a current medication list which includes the following prescription(s): amoxicillin-clavulanate 875-125mg, azelastine, benzonatate, calcium carbonate, cyclobenzaprine, docusate sodium, drospirenone-ethinyl estradiol, fluticasone propionate, ibuprofen, levothyroxine, naloxone, oxycodone-acetaminophen, and promethazine-codeine 6.25-10 mg/5 ml.    Allergies:   Review of patient's allergies indicates:   Allergen Reactions    Pseudoephedrine hcl Rash and Shortness Of Breath          Objective       Observation:     Posture Alignment: slouched posture;rounded shoulders ;   Shoulder rotation at rest: protracted     Sensation: Light touch: impaired to light touch anterior shoulders     DTR: NP     GAIT DEVIATIONS: Michael CHADWICK displays no major deviation     Cervical Range of Motion:    Degrees Pain   Flexion 44  yes     Extension 31 Yes      Right Rotation Min loss yes     Left Rotation Major loss  yes     Right Side Bending 24 deg Yes    Left Side Bending 20  Yes        Active Range of Motion in  (degrees):   Shoulder Right Left   Flexion 83 80 deg    Abduction 65 76    ER Functional reach T3  Functional reach T3   IR  Functional reach T7 Functional reach T7     Upper Extremity Strength  (R) UE  (L) UE    Elbow flexion: 4+/5 Elbow flexion: 4+/5   Elbow extension: 4+/5 Elbow extension: 4+/5    Shoulder elevation: 4-/5 Shoulder elevation: 4-/5   Shoulder flexion: 3-/5 Shoulder flexion: 3-/5   Shoulder Abduction: 3-/5 Shoulder abduction: 3-/5   Shoulder ER 3+/5 Shoulder ER 3+/5   Shoulder IR 3+/5 Shoulder IR 3+/5         Palpation: Severe pain and tenderness at R shoulder blades. Numbness of the R anterior and lateral shoulders    Scapula: protracted     Flexibility: decrease upper traps and levator scap           TREATMENT   Treatment Time In: 7:35 am  Treatment Time Out: 7:45 am  Total Treatment time separate from Evaluation: 10 minutes    Michael received therapeutic exercises to develop strength, endurance, and ROM for 10 minutes including:    HOB elevated supine shoulder flexion 1# philip VALLE   HOB elevated supine chest press 1# philip VALLE   Trini's flexion       Home Exercises and Patient Education Provided    Education provided:   - Perform HEP 2 times per day     Written Home Exercises Provided: Patient instructed to cont prior HEP.  Exercises were reviewed and Michael was able to demonstrate them prior to the end of the session.  Michael demonstrated good  understanding of the education provided.     See EMR under Patient Instructions for exercises provided 11/10/2023.    Assessment   Michael CHADWICK is a 28 y.o. female referred to outpatient Physical Therapy with a medical diagnosis of Shoulder weakness. Pt presents to therapy after s/p total thyroidectomy , central neck dissection and bilateral 2-5 neck dissection for papillary thyroid cancer.  Pt had surgery on 10-31-23. Pt has increase L cervical swollen. Pt has decrease B shoulder AROM and muscles strength. Pt has numbness of R anterior and lateral shoulder. Pain at R shoulder blade region. During palpation, Severe pain and tenderness at R shoulder blades. Numbness of the R anterior and lateral shoulders. Pain and weakness are limiting functional mobility at home and community. Pt will benefit from skilled PT services to return to [facility].      Pt prognosis is Good.   Pt will benefit from skilled outpatient Physical Therapy to address the deficits stated above and in the chart below, provide pt/family education, and to maximize pt's level of independence.     Plan of care discussed with patient: Yes  Pt's spiritual, cultural and educational needs considered and patient is agreeable to the plan of care and goals as stated below:     Anticipated Barriers for therapy:Scheduling issues     Medical Necessity is demonstrated by the following  History  Co-morbidities and personal factors that may impact the plan of care Co-morbidities:   Allergy   Anxiety   Gallstones   PCOS (polycystic ovarian syndrome)       Personal Factors:   no deficits     low   Examination  Body Structures and Functions, activity limitations and participation restrictions that may impact the plan of care Body Regions:   upper extremities    Body Systems:    ROM  strength  transfers  transitions  motor control  motor learning    Participation Restrictions:   Work and community activities     Activity limitations:   Learning and applying knowledge  no deficits    General Tasks and Commands  no deficits    Communication  no deficits    Mobility  lifting and carrying objects  fine hand use (grasping/picking up)    Self care  no deficits    Domestic Life  shopping  cooking  doing house work (cleaning house, washing dishes, laundry)    Interactions/Relationships  no deficits    Life Areas  no deficits    Community and Social Life  community life         Complexity: low  See FOTO outcome assessment    Clinical Presentation stable and uncomplicated low   Decision Making/ Complexity Score: low     GOALS: Short Term Goals: 4 weeks  1.Report decreased in pain at worse less than  <   / =  5  /10  to increase tolerance for functional mobility. On going  2. Pt to improve B shoulders range of motion by 25% to allow for improved functional mobility to allow for improvement in IADLs. On going  3.  Increased B shoulders  MMT 1/2 grade to increase tolerance for ADL and work activities. On going  4. Pt to report be conscious of impaired sitting and standing posture daily to decrease pain. On going  5. Pt to tolerate HEP to improve ROM and independence with ADL's. On going    Long Term Goals: 8 weeks  1.Report decreased in pain at worse less than  <   / =  2  /10  to increase tolerance for functional mobility. On going  2.  Patient will demonstrate improved overall function per FOTO Survey to CI = at least 1% but < 20% impaired, limited or restricted score or less.On going  3.Increased B shoulders  MMT 1 grade to increase tolerance for ADL and work activities.On going  4. Pt to report and demonstrate proper posture in standing and sitting to decrease pain. On going  5. Pt to be Independent with HEP to improve ROM and independence with ADL's.On going  6. Pt to improve B shoulders  range of motion by 75% to allow for improved functional mobility to allow for improvement in IADLs. On going    Plan   Plan of care Certification: 11/10/2023 to 2--10-24.    Outpatient Physical Therapy 2 times weekly for 12 weeks to include the following interventions: Manual Therapy, Moist Heat/ Ice, Neuromuscular Re-ed, Patient Education, Therapeutic Activities, and Therapeutic Exercise. Dry jose maria Patel, PT      I CERTIFY THE NEED FOR THESE SERVICES FURNISHED UNDER THIS PLAN OF TREATMENT AND WHILE UNDER MY CARE   Physician's comments:     Physician's Signature: ___________________________________________________

## 2023-11-11 ENCOUNTER — HOSPITAL ENCOUNTER (INPATIENT)
Facility: HOSPITAL | Age: 28
LOS: 3 days | Discharge: HOME | DRG: 581 | End: 2023-11-14
Attending: EMERGENCY MEDICINE | Admitting: OTOLARYNGOLOGY
Payer: MEDICAID

## 2023-11-11 DIAGNOSIS — Z01.810 PREOP CARDIOVASCULAR EXAM: ICD-10-CM

## 2023-11-11 DIAGNOSIS — Z98.890: Primary | ICD-10-CM

## 2023-11-11 DIAGNOSIS — E89.0 HISTORY OF THYROIDECTOMY: ICD-10-CM

## 2023-11-11 DIAGNOSIS — R22.1 NECK SWELLING: ICD-10-CM

## 2023-11-11 DIAGNOSIS — S27.899S: ICD-10-CM

## 2023-11-11 DIAGNOSIS — R22.1 NECK SWELLING: Primary | ICD-10-CM

## 2023-11-11 LAB
ABO + RH BLD: NORMAL
ALBUMIN SERPL BCP-MCNC: 3.6 G/DL (ref 3.5–5.2)
ALP SERPL-CCNC: 59 U/L (ref 55–135)
ALT SERPL W/O P-5'-P-CCNC: 22 U/L (ref 10–44)
ANION GAP SERPL CALC-SCNC: 14 MMOL/L (ref 8–16)
APTT PPP: 26 SEC (ref 21–32)
AST SERPL-CCNC: 22 U/L (ref 10–40)
B-HCG UR QL: NEGATIVE
BASOPHILS # BLD AUTO: 0.04 K/UL (ref 0–0.2)
BASOPHILS NFR BLD: 0.6 % (ref 0–1.9)
BILIRUB SERPL-MCNC: 0.3 MG/DL (ref 0.1–1)
BLD GP AB SCN CELLS X3 SERPL QL: NORMAL
BUN SERPL-MCNC: 17 MG/DL (ref 6–20)
CALCIUM SERPL-MCNC: 9.7 MG/DL (ref 8.7–10.5)
CHLORIDE SERPL-SCNC: 107 MMOL/L (ref 95–110)
CO2 SERPL-SCNC: 20 MMOL/L (ref 23–29)
CREAT SERPL-MCNC: 0.8 MG/DL (ref 0.5–1.4)
CTP QC/QA: YES
DIFFERENTIAL METHOD: ABNORMAL
EOSINOPHIL # BLD AUTO: 0.1 K/UL (ref 0–0.5)
EOSINOPHIL NFR BLD: 1.2 % (ref 0–8)
ERYTHROCYTE [DISTWIDTH] IN BLOOD BY AUTOMATED COUNT: 12.7 % (ref 11.5–14.5)
EST. GFR  (NO RACE VARIABLE): >60 ML/MIN/1.73 M^2
GLUCOSE SERPL-MCNC: 107 MG/DL (ref 70–110)
HCT VFR BLD AUTO: 36.7 % (ref 37–48.5)
HGB BLD-MCNC: 11.9 G/DL (ref 12–16)
IMM GRANULOCYTES # BLD AUTO: 0.02 K/UL (ref 0–0.04)
IMM GRANULOCYTES NFR BLD AUTO: 0.3 % (ref 0–0.5)
INR PPP: 1 (ref 0.8–1.2)
LYMPHOCYTES # BLD AUTO: 1.2 K/UL (ref 1–4.8)
LYMPHOCYTES NFR BLD: 17.7 % (ref 18–48)
MCH RBC QN AUTO: 29 PG (ref 27–31)
MCHC RBC AUTO-ENTMCNC: 32.4 G/DL (ref 32–36)
MCV RBC AUTO: 89 FL (ref 82–98)
MONOCYTES # BLD AUTO: 0.5 K/UL (ref 0.3–1)
MONOCYTES NFR BLD: 6.6 % (ref 4–15)
NEUTROPHILS # BLD AUTO: 5 K/UL (ref 1.8–7.7)
NEUTROPHILS NFR BLD: 73.6 % (ref 38–73)
NRBC BLD-RTO: 0 /100 WBC
PLATELET # BLD AUTO: 376 K/UL (ref 150–450)
PMV BLD AUTO: 8.7 FL (ref 9.2–12.9)
POTASSIUM SERPL-SCNC: 4 MMOL/L (ref 3.5–5.1)
PROT SERPL-MCNC: 7.6 G/DL (ref 6–8.4)
PROTHROMBIN TIME: 10.6 SEC (ref 9–12.5)
RBC # BLD AUTO: 4.11 M/UL (ref 4–5.4)
SODIUM SERPL-SCNC: 141 MMOL/L (ref 136–145)
SPECIMEN OUTDATE: NORMAL
WBC # BLD AUTO: 6.84 K/UL (ref 3.9–12.7)

## 2023-11-11 PROCEDURE — 94799 UNLISTED PULMONARY SVC/PX: CPT | Mod: XB

## 2023-11-11 PROCEDURE — 85610 PROTHROMBIN TIME: CPT | Performed by: EMERGENCY MEDICINE

## 2023-11-11 PROCEDURE — 85025 COMPLETE CBC W/AUTO DIFF WBC: CPT | Performed by: EMERGENCY MEDICINE

## 2023-11-11 PROCEDURE — 63600175 PHARM REV CODE 636 W HCPCS: Mod: JB | Performed by: OTOLARYNGOLOGY

## 2023-11-11 PROCEDURE — 93005 ELECTROCARDIOGRAM TRACING: CPT

## 2023-11-11 PROCEDURE — 80053 COMPREHEN METABOLIC PANEL: CPT | Performed by: EMERGENCY MEDICINE

## 2023-11-11 PROCEDURE — 85730 THROMBOPLASTIN TIME PARTIAL: CPT | Performed by: EMERGENCY MEDICINE

## 2023-11-11 PROCEDURE — 93010 ELECTROCARDIOGRAM REPORT: CPT | Mod: ,,, | Performed by: INTERNAL MEDICINE

## 2023-11-11 PROCEDURE — 11000001 HC ACUTE MED/SURG PRIVATE ROOM

## 2023-11-11 PROCEDURE — 81025 URINE PREGNANCY TEST: CPT | Performed by: EMERGENCY MEDICINE

## 2023-11-11 PROCEDURE — 86901 BLOOD TYPING SEROLOGIC RH(D): CPT | Performed by: EMERGENCY MEDICINE

## 2023-11-11 PROCEDURE — 99285 EMERGENCY DEPT VISIT HI MDM: CPT | Mod: 25

## 2023-11-11 PROCEDURE — 93010 EKG 12-LEAD: ICD-10-PCS | Mod: ,,, | Performed by: INTERNAL MEDICINE

## 2023-11-11 PROCEDURE — 25000003 PHARM REV CODE 250: Performed by: OTOLARYNGOLOGY

## 2023-11-11 RX ORDER — ACETAMINOPHEN 325 MG/1
650 TABLET ORAL EVERY 6 HOURS PRN
Status: DISCONTINUED | OUTPATIENT
Start: 2023-11-11 | End: 2023-11-14 | Stop reason: HOSPADM

## 2023-11-11 RX ORDER — HYDROCODONE BITARTRATE AND ACETAMINOPHEN 5; 325 MG/1; MG/1
1 TABLET ORAL EVERY 4 HOURS PRN
Status: DISCONTINUED | OUTPATIENT
Start: 2023-11-11 | End: 2023-11-11

## 2023-11-11 RX ORDER — OCTREOTIDE ACETATE 50 UG/ML
100 INJECTION, SOLUTION INTRAVENOUS; SUBCUTANEOUS EVERY 8 HOURS
Status: DISCONTINUED | OUTPATIENT
Start: 2023-11-11 | End: 2023-11-11

## 2023-11-11 RX ORDER — IBUPROFEN 600 MG/1
600 TABLET ORAL EVERY 6 HOURS PRN
Status: DISCONTINUED | OUTPATIENT
Start: 2023-11-11 | End: 2023-11-14 | Stop reason: HOSPADM

## 2023-11-11 RX ORDER — OCTREOTIDE ACETATE 100 UG/ML
100 INJECTION, SOLUTION INTRAVENOUS; SUBCUTANEOUS EVERY 8 HOURS
Status: DISCONTINUED | OUTPATIENT
Start: 2023-11-11 | End: 2023-11-14 | Stop reason: HOSPADM

## 2023-11-11 RX ORDER — DOCUSATE SODIUM 100 MG/1
200 CAPSULE, LIQUID FILLED ORAL DAILY
Status: DISCONTINUED | OUTPATIENT
Start: 2023-11-11 | End: 2023-11-14 | Stop reason: HOSPADM

## 2023-11-11 RX ORDER — CALCIUM CARBONATE 200(500)MG
500 TABLET,CHEWABLE ORAL 2 TIMES DAILY
Status: DISCONTINUED | OUTPATIENT
Start: 2023-11-11 | End: 2023-11-14 | Stop reason: HOSPADM

## 2023-11-11 RX ORDER — BENZONATATE 100 MG/1
200 CAPSULE ORAL 3 TIMES DAILY PRN
Status: DISCONTINUED | OUTPATIENT
Start: 2023-11-11 | End: 2023-11-13

## 2023-11-11 RX ORDER — LEVOTHYROXINE SODIUM 125 UG/1
125 TABLET ORAL
Status: DISCONTINUED | OUTPATIENT
Start: 2023-11-12 | End: 2023-11-14 | Stop reason: HOSPADM

## 2023-11-11 RX ORDER — HYDROCODONE BITARTRATE AND ACETAMINOPHEN 5; 325 MG/1; MG/1
1 TABLET ORAL EVERY 6 HOURS PRN
Status: DISCONTINUED | OUTPATIENT
Start: 2023-11-11 | End: 2023-11-13

## 2023-11-11 RX ORDER — DIPHENHYDRAMINE HCL 25 MG
25 CAPSULE ORAL EVERY 6 HOURS PRN
Status: DISCONTINUED | OUTPATIENT
Start: 2023-11-11 | End: 2023-11-14 | Stop reason: HOSPADM

## 2023-11-11 RX ORDER — OCTREOTIDE ACETATE 50 UG/ML
50 INJECTION, SOLUTION INTRAVENOUS; SUBCUTANEOUS EVERY 8 HOURS
Qty: 30 ML | Refills: 0 | Status: ACTIVE | OUTPATIENT
Start: 2023-11-11 | End: 2023-11-13

## 2023-11-11 RX ADMIN — DOCUSATE SODIUM 200 MG: 100 CAPSULE, LIQUID FILLED ORAL at 04:11

## 2023-11-11 RX ADMIN — BENZONATATE 200 MG: 100 CAPSULE ORAL at 10:11

## 2023-11-11 RX ADMIN — IBUPROFEN 600 MG: 600 TABLET ORAL at 05:11

## 2023-11-11 RX ADMIN — AMPICILLIN AND SULBACTAM 3 G: 2; 1 INJECTION, POWDER, FOR SOLUTION INTRAVENOUS at 11:11

## 2023-11-11 RX ADMIN — OCTREOTIDE ACETATE 100 MCG: 100 INJECTION, SOLUTION INTRAVENOUS; SUBCUTANEOUS at 04:11

## 2023-11-11 RX ADMIN — AMPICILLIN AND SULBACTAM 3 G: 2; 1 INJECTION, POWDER, FOR SOLUTION INTRAVENOUS at 04:11

## 2023-11-11 RX ADMIN — OCTREOTIDE ACETATE 100 MCG: 100 INJECTION, SOLUTION INTRAVENOUS; SUBCUTANEOUS at 09:11

## 2023-11-11 RX ADMIN — CALCIUM CARBONATE (ANTACID) CHEW TAB 500 MG 500 MG: 500 CHEW TAB at 09:11

## 2023-11-11 NOTE — H&P
[facility]  Otorhinolaryngology-Head & Neck Surgery  History & Physical    Patient Name: Michael Gonzalez  MRN: [id number]  Admission Date: 11/11/2023  Attending Physician: Daphne Vazquez MD  Primary Care Provider: Christos Land MD    PLAN  The patient, her sister as well as myself and the pharmacist from [facility] have tried calling multiple pharmacies within the city including [facility] locations and the medication is not available until Monday. Due to lack of suction drain and potential of adverse effects in the neck that could arise such as carotid blowout and additionally potential need to monitor electrolytes and fluid shifts that could arise, patient admitted for octreotide and observation .     Needs to be on high protein/low fat diet with only medium chain fatty acids . Consult to dietition/nutrition placed    Octreotide 100mcg q8h to help decrease flow through the thoracic duct and hopefully allow for duct to close off. May need surgical intervention. Would like to potentially place drain to monitor leak. Will assess jennifer after aspiration today and once on octreotide , if still accumulating fluid in neck will place drain. Will try to have interventional radiology place to avoid 2 surgeries as I do not think I would be able to safely repair the duct due to inflammatory changes in neck with fluid in the neck. However, may need to do a washout and HARRIETT drain placement and do second surgery after on abx and octretotide to repair duct if conservative measurements fail.     Prophylactic unasyn as risk for developing infection with chyle in neck. Aspirated some at bedside but suspect there is additional material medial to SCM ; aspiration at this site did not reveal chyle but posterior to scm chyle was aspirated and approximately 10 cc removed.       Subjective:     Chief Complaint/Reason for Admission: chyle leak into neck with associated neck swelling    History of Present Illness:  28 y.o. female  presents with progressive neck swelling. Underwent total thyroidectomy, central and bilateral 2-5 neck dissections on 10-31-23. She did well postoperatively and was discharged on POD 2 from the hospital. Seen in clinic on POD 3. Drains with serosang output less than 25 cc in 24 hours and patient was on diet at that time. HARRIETT drains removed. Returned to clinic this week for staple removal and healing appropriately. Started developing significant cough that evening with severe coughing episodes. The next am noticed that her neck was more swollen on the left side. I saw her in clinic that day and noted some swelling compared to day prior and had some tenderness with slight erythema of skin and started her on antibiotics. Patient returned to clinic Friday for recheck and significantly more swollen in neck area. Pressure dressing applied in clinic and prescribed octreotide and medium chain fatty acid diet for suspected chyle leak. Patient has been unable to get octreotide in outpatient setting.     No current facility-administered medications on file prior to encounter.     Current Outpatient Medications on File Prior to Encounter   Medication Sig    amoxicillin-clavulanate 875-125mg (AUGMENTIN) 875-125 mg per tablet Take 1 tablet by mouth 2 (two) times daily. for 14 days    azelastine (ASTELIN) 137 mcg (0.1 %) nasal spray 1 spray (137 mcg total) by Nasal route 2 (two) times daily.    benzonatate (TESSALON) 200 MG capsule Take 1 capsule (200 mg total) by mouth 3 (three) times daily as needed for Cough.    calcium carbonate (TUMS) 200 mg calcium (500 mg) chewable tablet Take 1 tablet (500 mg total) by mouth 2 (two) times daily.    cyclobenzaprine (FLEXERIL) 5 MG tablet Take 1 tablet (5 mg total) by mouth 3 (three) times daily as needed for Muscle spasms.    docusate sodium (COLACE) 100 MG capsule Take 2 capsules (200 mg total) by mouth once daily.    drospirenone-ethinyl estradioL (YOLY) 3-0.02 mg per tablet Take 1 tablet  by mouth once daily.    fluticasone propionate (FLONASE) 50 mcg/actuation nasal spray 2 sprays (100 mcg total) by Each Nostril route 2 (two) times daily.    ibuprofen (ADVIL,MOTRIN) 600 MG tablet Take 1 tablet (600 mg total) by mouth every 6 (six) hours as needed for Pain.    levothyroxine (SYNTHROID) 125 MCG tablet Take 1 tablet (125 mcg total) by mouth before breakfast.    naloxone (NARCAN) 4 mg/actuation Spry 4mg by nasal route as needed for opioid overdose; may repeat every 2-3 minutes in alternating nostrils until medical help arrives. Call 911    octreotide (SANDOSTATIN) 50 mcg/mL Soln Inject 1 mL (50 mcg total) into the skin every 12 (twelve) hours. for 10 days    octreotide acetate (SANDOSTATIN) 50 mcg/mL (1 mL) Syrg Inject 1 mL (0.05 mg total) into the skin every 12 (twelve) hours. for 14 days    octreotide acetate (SANDOSTATIN) 50 mcg/mL (1 mL) Syrg Inject 1 mL (0.05 mg total) into the skin every 8 (eight) hours. for 10 days    oxyCODONE-acetaminophen (PERCOCET) 5-325 mg per tablet Take 1 tablet by mouth every 4 (four) hours as needed for Pain.    promethazine-codeine 6.25-10 mg/5 ml (PHENERGAN WITH CODEINE) 6.25-10 mg/5 mL syrup Take 5 mLs by mouth every 6 (six) hours as needed for Cough.       Review of patient's allergies indicates:   Allergen Reactions    Pseudoephedrine hcl Rash and Shortness Of Breath       Past Medical History:   Diagnosis Date    Allergy     Anxiety     Gallstones     PCOS (polycystic ovarian syndrome)      Past Surgical History:   Procedure Laterality Date    DISSECTION OF NECK Bilateral 10/31/2023    Procedure: DISSECTION, NECK;  Surgeon: Daphne Vazquez MD;  Location: [facility] OR;  Service: ENT;  Laterality: Bilateral;  possible bilateral    THYROIDECTOMY, BILATERAL Bilateral 10/31/2023    Procedure: THYROIDECTOMY,BILATERAL;  Surgeon: Daphne Vazquez MD;  Location: [facility] OR;  Service: ENT;  Laterality: Bilateral;  NEUROMONITORING MIREILLE HAIDER [phone]  RN PREOP  10/26/2023     Family History       Problem Relation (Age of Onset)    Diabetes Father    Hypertension Father    No Known Problems Mother          Tobacco Use    Smoking status: Former     Types: Cigarettes    Smokeless tobacco: Never    Tobacco comments:     socially   Substance and Sexual Activity    Alcohol use: No    Drug use: No    Sexual activity: Yes     Partners: Male     Birth control/protection: None     Review of Systems   Constitutional:  Negative for fever.   HENT:  Positive for sore throat.    Respiratory:  Positive for cough.    Cardiovascular:  Negative for chest pain.   Musculoskeletal:  Positive for neck pain.   Neurological:  Negative for dizziness and headaches.     Objective:     Vital Signs (Most Recent):  Temp: 98.4 °F (36.9 °C) (11/11/23 1238)  Pulse: 88 (11/11/23 1358)  Resp: 19 (11/11/23 1358)  BP: 123/78 (11/11/23 1238)  SpO2: 98 % (11/11/23 1358) Vital Signs (24h Range):  Temp:  [98.4 °F (36.9 °C)] 98.4 °F (36.9 °C)  Pulse:  [] 88  Resp:  [12-19] 19  SpO2:  [98 %-100 %] 98 %  BP: (123-130)/(78-88) 123/78     Weight: 80.7 kg (178 lb)  Body mass index is 29.62 kg/m².    Physical Exam  Neck:      Comments: Incision healing well. Left neck with swelling anterior and posterior to SCM. Softer after aspirated fluid. Compression dressing placed   Pulmonary:      Effort: Pulmonary effort is normal.      Breath sounds: No stridor.   Musculoskeletal:      Cervical back: Normal range of motion.   Neurological:      Mental Status: She is alert.     Aspiration of neck performed after 1%lidoacine with epi injected into skin . Aspiration performed anterior to scm with minimal to no fluid . Posterior to scm aspiration revealed milk contents consistent with chyle .     Significant Labs:  CBC:   Recent Labs   Lab 11/11/23  1340   WBC 6.84   RBC 4.11   HGB 11.9*   HCT 36.7*      MCV 89   MCH 29.0   MCHC 32.4     CMP:   Recent Labs   Lab 11/11/23  1340      CALCIUM 9.7   ALBUMIN 3.6   PROT  7.6      K 4.0   CO2 20*      BUN 17   CREATININE 0.8   ALKPHOS 59   ALT 22   AST 22   BILITOT 0.3       Assessment/Plan:   Chyle leak s/p neck dissection     VTE Risk Mitigation (From admission, onward)           Ordered     IP VTE HIGH RISK PATIENT  Once         11/11/23 1431     Place sequential compression device  Until discontinued         11/11/23 1431                    Daphne Vazquez MD  Otorhinolaryngology-Head & Neck Surgery  [facility]

## 2023-11-11 NOTE — ED PROVIDER NOTES
Encounter Date: 11/11/2023       History     Chief Complaint   Patient presents with    Post-op Problem     Pt to ED, sent by MD. Pt reporting hx of thyroid cancer, pt had surgery the end of October. Pt has left sided neck swelling. MD believes a possibly ruptured duct. MD assessing pt in triage. Pt denies blood thinner use.      28-year-old female with recent thyroidectomy presenting today secondary to swelling of the left side of her neck.  Was seen by Dr. Vazquez.  She is recommended patient be evaluated get admitted further workup management.  Patient's denies any fevers chills.  Denies any difficulty with swallowing or breathing at this time.  No new rashes or lesions.  States that when she took her dressing off earlier this morning and immediately started to swell up.  No chest pain arm pain leg pain or any other complaints.  Not on any blood thinners per patient.  No other complaints.        Review of patient's allergies indicates:   Allergen Reactions    Pseudoephedrine hcl Rash and Shortness Of Breath     Past Medical History:   Diagnosis Date    Allergy     Anxiety     Gallstones     PCOS (polycystic ovarian syndrome)      Past Surgical History:   Procedure Laterality Date    DISSECTION OF NECK Bilateral 10/31/2023    Procedure: DISSECTION, NECK;  Surgeon: Daphne Vazquez MD;  Location: [facility] OR;  Service: ENT;  Laterality: Bilateral;  possible bilateral    THYROIDECTOMY, BILATERAL Bilateral 10/31/2023    Procedure: THYROIDECTOMY,BILATERAL;  Surgeon: Daphne Vazquez MD;  Location: [facility] OR;  Service: ENT;  Laterality: Bilateral;  NEUROMONITORING MIREILLE HAIDER [phone]  RN PREOP 10/26/2023     Family History   Problem Relation Age of Onset    No Known Problems Mother     Diabetes Father     Hypertension Father      Social History     Tobacco Use    Smoking status: Former     Types: Cigarettes    Smokeless tobacco: Never    Tobacco comments:     socially   Substance Use Topics    Alcohol use:  No    Drug use: No     Review of Systems   Constitutional:  Negative for fever.   HENT:  Negative for sore throat.    Respiratory:  Negative for shortness of breath.    Cardiovascular:  Negative for chest pain.   Gastrointestinal:  Negative for nausea.   Genitourinary:  Negative for dysuria.   Musculoskeletal:  Negative for back pain.   Skin:  Positive for wound. Negative for rash.   Neurological:  Negative for weakness.   Hematological:  Does not bruise/bleed easily.       Physical Exam     Initial Vitals [11/11/23 1238]   BP Pulse Resp Temp SpO2   123/78 (!) 113 18 98.4 °F (36.9 °C) 100 %      MAP       --         Physical Exam    Nursing note and vitals reviewed.  Constitutional: She appears well-developed and well-nourished.   HENT:   Head: Normocephalic and atraumatic.   Mouth/Throat: Oropharynx is clear and moist and mucous membranes are normal.   Eyes: Conjunctivae and EOM are normal. Pupils are equal, round, and reactive to light. Right conjunctiva is not injected. Left conjunctiva is not injected. No scleral icterus.   Neck: Neck supple.   Wrapping on neck.  Intact.   Normal range of motion.   Full passive range of motion without pain.     Cardiovascular:  Normal rate, regular rhythm, S1 normal, S2 normal, normal heart sounds and normal pulses.     Exam reveals no gallop and no friction rub.       No murmur heard.  Pulses:       Radial pulses are 2+ on the right side and 2+ on the left side.   Pulmonary/Chest: Effort normal and breath sounds normal. No respiratory distress.   Abdominal: Abdomen is soft. She exhibits no distension. There is no abdominal tenderness.   Musculoskeletal:         General: No edema. Normal range of motion.      Cervical back: Full passive range of motion without pain, normal range of motion and neck supple.      Comments: Good active ROM of all extremities. No lower extremity edema or cyanosis.      Neurological: No cranial nerve deficit. Gait normal.   A&Ox4, normal speech.    Skin: Skin is warm. No ecchymosis and no rash noted.   Psychiatric: She has a normal mood and affect. Thought content normal.         ED Course   Procedures  Labs Reviewed   COMPREHENSIVE METABOLIC PANEL   CBC W/ AUTO DIFFERENTIAL   APTT   PROTIME-INR   POCT URINE PREGNANCY   TYPE & SCREEN     EKG Readings: (Independently Interpreted)   Rhythm: Normal Sinus Rhythm. Heart Rate: 82. Ectopy: No Ectopy. Conduction: Normal. ST Segments: Normal ST Segments. T Waves: Normal. Clinical Impression: Normal Sinus Rhythm       Imaging Results              X-Ray Chest AP Portable (In process)                      Medications - No data to display  Medical Decision Making  Differential diagnosis is neck infection, postsurgical complication, cellulitis.    Wound looked up by ENT.  Does not look infectious.  Aspirated area which looks like chyle.  Preop labs ordered on the patient and placed observation further workup management.  ENT to follow up labs/imaging    Amount and/or Complexity of Data Reviewed  Labs: ordered.  Radiology: ordered. Decision-making details documented in ED Course.                               Clinical Impression:   Final diagnoses:  [Z01.810] Preop cardiovascular exam  [E89.0] History of thyroidectomy        ED Disposition Condition    Observation Stable                Angel Maxwell MD  11/11/23 5472

## 2023-11-11 NOTE — ED NOTES
Pt with a history of thyroid cancer with thyroidectomy in the end of Oct. Not on Chemo or radiation. Pt presented to ED today w/ c/o swelling to left side of her neck. Dr Vazquez w/ ENT presents w/ pt and is doing a diagnostic drain at bedside.   Pt is AAOx3, resp even and unlabored, skin warm and dry. Denies sob or problems breathing but states she feels pressure on her throat.  HOB elevated, SR up x 2, call bell within reach.  at bedside.

## 2023-11-11 NOTE — NURSING
[facility] [place] 4 [place]      11/11/2023       Skin assessed on: Admit      [x] No Pressure Injuries Present    []Prevention Measures Documented    [] Yes LDA  for Pressure Injury Previously documented     [] Yes New Pressure Injury Discovered   [] LDA for New Pressure Injury Added      Attending RN:  Paulina Verde RN     Second RN:  Eli

## 2023-11-12 PROBLEM — C73 THYROID CANCER: Status: ACTIVE | Noted: 2023-11-12

## 2023-11-12 PROBLEM — L02.11 NECK ABSCESS: Status: ACTIVE | Noted: 2023-11-12

## 2023-11-12 PROBLEM — S27.899A: Status: ACTIVE | Noted: 2023-11-12

## 2023-11-12 LAB
ALBUMIN SERPL BCP-MCNC: 3.3 G/DL (ref 3.5–5.2)
ALP SERPL-CCNC: 53 U/L (ref 55–135)
ALT SERPL W/O P-5'-P-CCNC: 19 U/L (ref 10–44)
ANION GAP SERPL CALC-SCNC: 12 MMOL/L (ref 8–16)
AST SERPL-CCNC: 19 U/L (ref 10–40)
BASOPHILS # BLD AUTO: 0.04 K/UL (ref 0–0.2)
BASOPHILS NFR BLD: 0.7 % (ref 0–1.9)
BILIRUB SERPL-MCNC: 0.5 MG/DL (ref 0.1–1)
BUN SERPL-MCNC: 15 MG/DL (ref 6–20)
CALCIUM SERPL-MCNC: 9.1 MG/DL (ref 8.7–10.5)
CHLORIDE SERPL-SCNC: 105 MMOL/L (ref 95–110)
CO2 SERPL-SCNC: 22 MMOL/L (ref 23–29)
CREAT SERPL-MCNC: 0.7 MG/DL (ref 0.5–1.4)
DIFFERENTIAL METHOD: ABNORMAL
EOSINOPHIL # BLD AUTO: 0.2 K/UL (ref 0–0.5)
EOSINOPHIL NFR BLD: 3 % (ref 0–8)
ERYTHROCYTE [DISTWIDTH] IN BLOOD BY AUTOMATED COUNT: 12.6 % (ref 11.5–14.5)
EST. GFR  (NO RACE VARIABLE): >60 ML/MIN/1.73 M^2
GLUCOSE SERPL-MCNC: 103 MG/DL (ref 70–110)
HCT VFR BLD AUTO: 34.4 % (ref 37–48.5)
HGB BLD-MCNC: 11.3 G/DL (ref 12–16)
IMM GRANULOCYTES # BLD AUTO: 0.03 K/UL (ref 0–0.04)
IMM GRANULOCYTES NFR BLD AUTO: 0.5 % (ref 0–0.5)
LYMPHOCYTES # BLD AUTO: 1.4 K/UL (ref 1–4.8)
LYMPHOCYTES NFR BLD: 24.7 % (ref 18–48)
MCH RBC QN AUTO: 28.9 PG (ref 27–31)
MCHC RBC AUTO-ENTMCNC: 32.8 G/DL (ref 32–36)
MCV RBC AUTO: 88 FL (ref 82–98)
MONOCYTES # BLD AUTO: 0.5 K/UL (ref 0.3–1)
MONOCYTES NFR BLD: 8.5 % (ref 4–15)
NEUTROPHILS # BLD AUTO: 3.6 K/UL (ref 1.8–7.7)
NEUTROPHILS NFR BLD: 62.6 % (ref 38–73)
NRBC BLD-RTO: 0 /100 WBC
PLATELET # BLD AUTO: 383 K/UL (ref 150–450)
PMV BLD AUTO: 8.8 FL (ref 9.2–12.9)
POTASSIUM SERPL-SCNC: 3.8 MMOL/L (ref 3.5–5.1)
PROT SERPL-MCNC: 7 G/DL (ref 6–8.4)
RBC # BLD AUTO: 3.91 M/UL (ref 4–5.4)
SODIUM SERPL-SCNC: 139 MMOL/L (ref 136–145)
WBC # BLD AUTO: 5.67 K/UL (ref 3.9–12.7)

## 2023-11-12 PROCEDURE — 11000001 HC ACUTE MED/SURG PRIVATE ROOM

## 2023-11-12 PROCEDURE — 25500020 PHARM REV CODE 255: Performed by: OTOLARYNGOLOGY

## 2023-11-12 PROCEDURE — 80053 COMPREHEN METABOLIC PANEL: CPT | Performed by: OTOLARYNGOLOGY

## 2023-11-12 PROCEDURE — 63600175 PHARM REV CODE 636 W HCPCS: Performed by: OTOLARYNGOLOGY

## 2023-11-12 PROCEDURE — 85025 COMPLETE CBC W/AUTO DIFF WBC: CPT | Performed by: OTOLARYNGOLOGY

## 2023-11-12 PROCEDURE — 36415 COLL VENOUS BLD VENIPUNCTURE: CPT | Performed by: OTOLARYNGOLOGY

## 2023-11-12 PROCEDURE — 25000003 PHARM REV CODE 250: Performed by: OTOLARYNGOLOGY

## 2023-11-12 RX ORDER — DEXTROSE MONOHYDRATE, SODIUM CHLORIDE, AND POTASSIUM CHLORIDE 50; 1.49; 4.5 G/1000ML; G/1000ML; G/1000ML
INJECTION, SOLUTION INTRAVENOUS CONTINUOUS
Status: DISCONTINUED | OUTPATIENT
Start: 2023-11-12 | End: 2023-11-14 | Stop reason: HOSPADM

## 2023-11-12 RX ADMIN — OCTREOTIDE ACETATE 100 MCG: 100 INJECTION, SOLUTION INTRAVENOUS; SUBCUTANEOUS at 06:11

## 2023-11-12 RX ADMIN — IOHEXOL 80 ML: 350 INJECTION, SOLUTION INTRAVENOUS at 10:11

## 2023-11-12 RX ADMIN — CALCIUM CARBONATE (ANTACID) CHEW TAB 500 MG 500 MG: 500 CHEW TAB at 09:11

## 2023-11-12 RX ADMIN — AMPICILLIN AND SULBACTAM 3 G: 2; 1 INJECTION, POWDER, FOR SOLUTION INTRAVENOUS at 03:11

## 2023-11-12 RX ADMIN — DEXTROSE, SODIUM CHLORIDE, AND POTASSIUM CHLORIDE: 5; .45; .15 INJECTION INTRAVENOUS at 10:11

## 2023-11-12 RX ADMIN — AMPICILLIN AND SULBACTAM 3 G: 2; 1 INJECTION, POWDER, FOR SOLUTION INTRAVENOUS at 08:11

## 2023-11-12 RX ADMIN — DOCUSATE SODIUM 200 MG: 100 CAPSULE, LIQUID FILLED ORAL at 08:11

## 2023-11-12 RX ADMIN — LEVOTHYROXINE SODIUM 125 MCG: 0.12 TABLET ORAL at 06:11

## 2023-11-12 RX ADMIN — IBUPROFEN 600 MG: 600 TABLET ORAL at 07:11

## 2023-11-12 RX ADMIN — CALCIUM CARBONATE (ANTACID) CHEW TAB 500 MG 500 MG: 500 CHEW TAB at 10:11

## 2023-11-12 RX ADMIN — OCTREOTIDE ACETATE 100 MCG: 100 INJECTION, SOLUTION INTRAVENOUS; SUBCUTANEOUS at 10:11

## 2023-11-12 RX ADMIN — OCTREOTIDE ACETATE 100 MCG: 100 INJECTION, SOLUTION INTRAVENOUS; SUBCUTANEOUS at 03:11

## 2023-11-12 NOTE — NURSING
[facility], [facility] [place]  Nurses Note -- 4 Eyes      11/11/2023       Skin assessed on: Q Shift      [x] No Pressure Injuries Present    []Prevention Measures Documented    [] Yes LDA  for Pressure Injury Previously documented     [] Yes New Pressure Injury Discovered   [] LDA for New Pressure Injury Added      Attending RN:  Chari Steven RN     Second RN:  Paulina Verde RN

## 2023-11-12 NOTE — PROGRESS NOTES
[facility] Surg  Otorhinolaryngology-Head & Neck Surgery  Progress Note    Patient Name: Michael Gonzalez  MRN: [id number]  Code Status: Full Code  Admission Date: 11/11/2023  Hospital Length of Stay: 1 days  Attending Physician: Daphne Vazquez MD  Primary Care Provider: Christos Land MD    Subjective:     Interval History: drainage from neck aspiration site stopped soon after first dose of octreotide. Has not had pressure sensation nor difficult jaw opening issues recur since aspiration and starting octreotide. Did not get a lot of sleep last night with vitals/labs etc. Had headache developing overnight with head wrap     Post-Op Info:  * No surgery found *      Hospital Day: 2      Medications:  Continuous Infusions:   dextrose 5 % and 0.45 % NaCl with KCl 20 mEq       Scheduled Meds:   ampicillin-sulbactim (UNASYN) IVPB  3 g Intravenous Q8H    calcium carbonate  500 mg Oral BID    docusate sodium  200 mg Oral Daily    levothyroxine  125 mcg Oral Before breakfast    octreotide  100 mcg Subcutaneous Q8H     PRN Meds:acetaminophen, benzonatate, diphenhydrAMINE, HYDROcodone-acetaminophen, ibuprofen     Objective:     Vital Signs (24h Range):  Temp:  [98.3 °F (36.8 °C)-99.2 °F (37.3 °C)] 98.8 °F (37.1 °C)  Pulse:  [] 80  Resp:  [12-19] 18  SpO2:  [96 %-100 %] 97 %  BP: (110-125)/(65-79) 110/65     Date 11/12/23 0700 - 11/13/23 0659   Shift 0501-4042 1987-9774 1817-7872 24 Hour Total   INTAKE   P.O. 100   100   Shift Total(mL/kg) 100(1.3)   100(1.3)   OUTPUT   Shift Total(mL/kg)       Weight (kg) 80 80 80 80   Drains not present- below indicated drain that was in place after surgery which were removed.   Lines/Drains/Airways       Drain  Duration                  Closed/Suction Drain 10/31/23 1734 Tube - 1 Anterior Neck Bulb 10 Fr. 11 days         Closed/Suction Drain 10/31/23 1734 Tube - 2 Anterior Neck Bulb 10 Fr. 11 days         Closed/Suction Drain 10/31/23 1740 Tube - 3 Anterior Neck Bulb 10 Fr. 11  days              Peripheral Intravenous Line  Duration                  Peripheral IV - Single Lumen 11/11/23 1352 20 G Anterior;Left Forearm <1 day                    Physical Exam  Constitutional:       General: She is not in acute distress.     Appearance: She is not toxic-appearing.   HENT:      Mouth/Throat:      Mouth: Mucous membranes are moist.      Comments: No trismus  Neck:        Comments: No fluid draining from puncture site.   Incision healing well. No erythema of skin. Tenderness with palpation has improved.   Pulmonary:      Effort: Pulmonary effort is normal.      Breath sounds: No stridor.      Comments: Voice normal   Neurological:      Mental Status: She is alert.         Significant Labs:  CBC:   Recent Labs   Lab 11/12/23 0428   WBC 5.67   RBC 3.91*   HGB 11.3*   HCT 34.4*      MCV 88   MCH 28.9   MCHC 32.8     CMP:   Recent Labs   Lab 11/12/23 0428      CALCIUM 9.1   ALBUMIN 3.3*   PROT 7.0      K 3.8   CO2 22*      BUN 15   CREATININE 0.7   ALKPHOS 53*   ALT 19   AST 19   BILITOT 0.5       Significant Diagnostics:  CXR reviewed. No chylothorax. Well aerated    Assessment/Plan:   S/p total thyroidectomy , central neck dissection and b/l 2-5 neck dissections 10-31-23; s/p HARRIETT drain removal 11-3-23; neck swelling developed after strong coughing attack 11-8-23. Aspiration of fluid in ED yesterday revealed gareth chyle.     PLAN:  Concern that chyle may be going into retropharyngeal potential space over to right side of neck given description of difficulty with jaw opening that has since improved. Swelling is stable compared to yesterday /not worsening     Will give list of foods for chyle leak diet to dietary services ; albumin with slight drop today. If persists and/or worsens will add protein supplement .     Ok to dc tele and continuous pulse ox; vitals can be qshift    Unasyn prophylactic to prevent infection developing from chyle in neck    Ok to hold off on  compression wrap now that on octreotide. If starts to notice external swelling or if starts to feel internal pressure sensations that had previously recommend to use compression dressing     Would like to get CT for surgical planning ; I think given symptom description before and after aspiration/octreotide that may have too much fluid in neck for IR procedure so I think going to OR for washout and HARRIETT drain placement likely will be better management strategy. Discussed with patient that if I can safely address the thoracic duct at that time I will but given fragility of duct and expected inflammation from chyle being in the neck, do not want to risk further injury. Discussed that if unable to ligate/repair would see if small volume leakage could put tisseal on the duct. Plan for procedure tomorrow is to get fluid out of neck and place drain. Drain will also help monitor output and ensure that leakage has stopped or not which will also dictate octreotide mgmt. Will need octreotide for at  least 2 days once duct leakage has ceased. Did discuss in rare instances duct has to be ligated intrathroacic. Discussed that may need to open more of the incision if also going over to right side. Clinically right side looks good but concern it could be spreading in potential space deeper down that I am unable to palpate so want to make sure address those areas if need be intraoperatively.     Ct neck with contrast ordered    NPO after midnight; IVF will NPO    Needs continued inpatient care for octreotide- unable to get as outpatient - called almost 15 pharmacies yesterday to try to find. Also needs electrolyte monitoring; if unable to get octreotide and chyle leak untreated can lead to carotid blowout thus needs to be inpatient for monitoring of neck and to obtain this medication. Depending on events in surgery tomorrow will determine dc planning    Daphne Vazquez MD  Otorhinolaryngology-Head & Neck Surgery  [facility]  Surg

## 2023-11-12 NOTE — NURSING
[facility], [facility]  Nurses Note -- 4 Eyes      11/12/2023       Skin assessed on: Q Shift      [x] No Pressure Injuries Present    []Prevention Measures Documented    [] Yes LDA  for Pressure Injury Previously documented     [] Yes New Pressure Injury Discovered   [] LDA for New Pressure Injury Added      Attending RN:  Paulina Verde RN     Second RN:  Chari

## 2023-11-12 NOTE — PLAN OF CARE
Problem: Adult Inpatient Plan of Care  Goal: Patient-Specific Goal (Individualized)  11/12/2023 0503 by Chari Steven RN  Outcome: Ongoing, Progressing  11/12/2023 0503 by Chari Steven RN  Outcome: Ongoing, Progressing  Goal: Absence of Hospital-Acquired Illness or Injury  11/12/2023 0503 by Chari Steven RN  Outcome: Ongoing, Progressing  11/12/2023 0503 by Chari Steven RN  Outcome: Ongoing, Progressing  Goal: Optimal Comfort and Wellbeing  11/12/2023 0503 by Chari Steven RN  Outcome: Ongoing, Progressing  11/12/2023 0503 by Chari Steven RN  Outcome: Ongoing, Progressing     Problem: Fall Injury Risk  Goal: Absence of Fall and Fall-Related Injury  Outcome: Ongoing, Progressing     Problem: Infection  Goal: Absence of Infection Signs and Symptoms  Outcome: Ongoing, Progressing

## 2023-11-13 ENCOUNTER — PATIENT MESSAGE (OUTPATIENT)
Dept: SPEECH THERAPY | Facility: HOSPITAL | Age: 28
End: 2023-11-13
Payer: MEDICAID

## 2023-11-13 ENCOUNTER — ANESTHESIA (OUTPATIENT)
Dept: SURGERY | Facility: HOSPITAL | Age: 28
DRG: 581 | End: 2023-11-13
Payer: MEDICAID

## 2023-11-13 ENCOUNTER — ANESTHESIA EVENT (OUTPATIENT)
Dept: SURGERY | Facility: HOSPITAL | Age: 28
DRG: 581 | End: 2023-11-13
Payer: MEDICAID

## 2023-11-13 LAB
ALBUMIN SERPL BCP-MCNC: 3.4 G/DL (ref 3.5–5.2)
ALP SERPL-CCNC: 54 U/L (ref 55–135)
ALT SERPL W/O P-5'-P-CCNC: 20 U/L (ref 10–44)
ANION GAP SERPL CALC-SCNC: 7 MMOL/L (ref 8–16)
AST SERPL-CCNC: 18 U/L (ref 10–40)
BILIRUB SERPL-MCNC: 0.4 MG/DL (ref 0.1–1)
BUN SERPL-MCNC: 14 MG/DL (ref 6–20)
CALCIUM SERPL-MCNC: 9 MG/DL (ref 8.7–10.5)
CHLORIDE SERPL-SCNC: 107 MMOL/L (ref 95–110)
CO2 SERPL-SCNC: 25 MMOL/L (ref 23–29)
CREAT SERPL-MCNC: 0.7 MG/DL (ref 0.5–1.4)
EST. GFR  (NO RACE VARIABLE): >60 ML/MIN/1.73 M^2
GLUCOSE SERPL-MCNC: 106 MG/DL (ref 70–110)
POTASSIUM SERPL-SCNC: 4.1 MMOL/L (ref 3.5–5.1)
PROT SERPL-MCNC: 7 G/DL (ref 6–8.4)
SODIUM SERPL-SCNC: 139 MMOL/L (ref 136–145)

## 2023-11-13 PROCEDURE — C1729 CATH, DRAINAGE: HCPCS | Performed by: OTOLARYNGOLOGY

## 2023-11-13 PROCEDURE — 25000003 PHARM REV CODE 250: Performed by: OTOLARYNGOLOGY

## 2023-11-13 PROCEDURE — 25000003 PHARM REV CODE 250: Performed by: STUDENT IN AN ORGANIZED HEALTH CARE EDUCATION/TRAINING PROGRAM

## 2023-11-13 PROCEDURE — D9220A PRA ANESTHESIA: Mod: CRNA,,, | Performed by: STUDENT IN AN ORGANIZED HEALTH CARE EDUCATION/TRAINING PROGRAM

## 2023-11-13 PROCEDURE — 35800 PR EXPLOR POSTOP BLEED,INFEC,CLOT-NECK: ICD-10-PCS | Mod: 78,,, | Performed by: OTOLARYNGOLOGY

## 2023-11-13 PROCEDURE — D9220A PRA ANESTHESIA: ICD-10-PCS | Mod: CRNA,,, | Performed by: STUDENT IN AN ORGANIZED HEALTH CARE EDUCATION/TRAINING PROGRAM

## 2023-11-13 PROCEDURE — 63600175 PHARM REV CODE 636 W HCPCS: Performed by: NURSE ANESTHETIST, CERTIFIED REGISTERED

## 2023-11-13 PROCEDURE — 27201423 OPTIME MED/SURG SUP & DEVICES STERILE SUPPLY: Performed by: OTOLARYNGOLOGY

## 2023-11-13 PROCEDURE — 63600175 PHARM REV CODE 636 W HCPCS: Performed by: OTOLARYNGOLOGY

## 2023-11-13 PROCEDURE — 35800 EXPLORE NECK VESSELS: CPT | Mod: 78,,, | Performed by: OTOLARYNGOLOGY

## 2023-11-13 PROCEDURE — 80053 COMPREHEN METABOLIC PANEL: CPT | Performed by: OTOLARYNGOLOGY

## 2023-11-13 PROCEDURE — 11000001 HC ACUTE MED/SURG PRIVATE ROOM

## 2023-11-13 PROCEDURE — 36000704 HC OR TIME LEV I 1ST 15 MIN: Performed by: OTOLARYNGOLOGY

## 2023-11-13 PROCEDURE — 71000033 HC RECOVERY, INTIAL HOUR: Performed by: OTOLARYNGOLOGY

## 2023-11-13 PROCEDURE — D9220A PRA ANESTHESIA: Mod: ANES,,, | Performed by: ANESTHESIOLOGY

## 2023-11-13 PROCEDURE — D9220A PRA ANESTHESIA: ICD-10-PCS | Mod: ANES,,, | Performed by: ANESTHESIOLOGY

## 2023-11-13 PROCEDURE — 36415 COLL VENOUS BLD VENIPUNCTURE: CPT | Performed by: OTOLARYNGOLOGY

## 2023-11-13 PROCEDURE — 25000242 PHARM REV CODE 250 ALT 637 W/ HCPCS: Performed by: OTOLARYNGOLOGY

## 2023-11-13 PROCEDURE — 37000009 HC ANESTHESIA EA ADD 15 MINS: Performed by: OTOLARYNGOLOGY

## 2023-11-13 PROCEDURE — 36000705 HC OR TIME LEV I EA ADD 15 MIN: Performed by: OTOLARYNGOLOGY

## 2023-11-13 PROCEDURE — 25000003 PHARM REV CODE 250: Performed by: NURSE ANESTHETIST, CERTIFIED REGISTERED

## 2023-11-13 PROCEDURE — 37000008 HC ANESTHESIA 1ST 15 MINUTES: Performed by: OTOLARYNGOLOGY

## 2023-11-13 RX ORDER — PROPOFOL 10 MG/ML
VIAL (ML) INTRAVENOUS
Status: DISCONTINUED | OUTPATIENT
Start: 2023-11-13 | End: 2023-11-13

## 2023-11-13 RX ORDER — BENZONATATE 100 MG/1
200 CAPSULE ORAL 3 TIMES DAILY
Status: DISCONTINUED | OUTPATIENT
Start: 2023-11-13 | End: 2023-11-13

## 2023-11-13 RX ORDER — SODIUM CHLORIDE 0.9 % (FLUSH) 0.9 %
10 SYRINGE (ML) INJECTION
Status: DISCONTINUED | OUTPATIENT
Start: 2023-11-13 | End: 2023-11-13 | Stop reason: HOSPADM

## 2023-11-13 RX ORDER — LABETALOL HYDROCHLORIDE 5 MG/ML
INJECTION, SOLUTION INTRAVENOUS
Status: DISCONTINUED | OUTPATIENT
Start: 2023-11-13 | End: 2023-11-13

## 2023-11-13 RX ORDER — SCOLOPAMINE TRANSDERMAL SYSTEM 1 MG/1
PATCH, EXTENDED RELEASE TRANSDERMAL
Status: DISCONTINUED | OUTPATIENT
Start: 2023-11-13 | End: 2023-11-13

## 2023-11-13 RX ORDER — FENTANYL CITRATE 50 UG/ML
INJECTION, SOLUTION INTRAMUSCULAR; INTRAVENOUS
Status: DISCONTINUED | OUTPATIENT
Start: 2023-11-13 | End: 2023-11-13

## 2023-11-13 RX ORDER — OXYMETAZOLINE HCL 0.05 %
SPRAY, NON-AEROSOL (ML) NASAL
Status: DISCONTINUED | OUTPATIENT
Start: 2023-11-13 | End: 2023-11-13 | Stop reason: HOSPADM

## 2023-11-13 RX ORDER — HYDROMORPHONE HYDROCHLORIDE 2 MG/ML
0.2 INJECTION, SOLUTION INTRAMUSCULAR; INTRAVENOUS; SUBCUTANEOUS EVERY 5 MIN PRN
Status: DISCONTINUED | OUTPATIENT
Start: 2023-11-13 | End: 2023-11-13 | Stop reason: HOSPADM

## 2023-11-13 RX ORDER — PROMETHAZINE HYDROCHLORIDE 6.25 MG/5ML
25 SYRUP ORAL EVERY 6 HOURS PRN
Status: DISCONTINUED | OUTPATIENT
Start: 2023-11-13 | End: 2023-11-14 | Stop reason: HOSPADM

## 2023-11-13 RX ORDER — MIDAZOLAM HYDROCHLORIDE 1 MG/ML
INJECTION INTRAMUSCULAR; INTRAVENOUS
Status: DISCONTINUED | OUTPATIENT
Start: 2023-11-13 | End: 2023-11-13

## 2023-11-13 RX ORDER — HYDROCODONE BITARTRATE AND ACETAMINOPHEN 5; 325 MG/1; MG/1
2 TABLET ORAL EVERY 6 HOURS PRN
Status: DISCONTINUED | OUTPATIENT
Start: 2023-11-13 | End: 2023-11-14 | Stop reason: HOSPADM

## 2023-11-13 RX ORDER — PROPOFOL 10 MG/ML
VIAL (ML) INTRAVENOUS CONTINUOUS PRN
Status: DISCONTINUED | OUTPATIENT
Start: 2023-11-13 | End: 2023-11-13

## 2023-11-13 RX ORDER — LIDOCAINE HYDROCHLORIDE 20 MG/ML
INJECTION INTRAVENOUS
Status: DISCONTINUED | OUTPATIENT
Start: 2023-11-13 | End: 2023-11-13

## 2023-11-13 RX ORDER — ONDANSETRON 2 MG/ML
INJECTION INTRAMUSCULAR; INTRAVENOUS
Status: DISCONTINUED | OUTPATIENT
Start: 2023-11-13 | End: 2023-11-13

## 2023-11-13 RX ORDER — KETOROLAC TROMETHAMINE 30 MG/ML
INJECTION, SOLUTION INTRAMUSCULAR; INTRAVENOUS
Status: DISCONTINUED | OUTPATIENT
Start: 2023-11-13 | End: 2023-11-13

## 2023-11-13 RX ORDER — DEXAMETHASONE SODIUM PHOSPHATE 4 MG/ML
INJECTION, SOLUTION INTRA-ARTICULAR; INTRALESIONAL; INTRAMUSCULAR; INTRAVENOUS; SOFT TISSUE
Status: DISCONTINUED | OUTPATIENT
Start: 2023-11-13 | End: 2023-11-13

## 2023-11-13 RX ORDER — DIPHENHYDRAMINE HYDROCHLORIDE 50 MG/ML
INJECTION INTRAMUSCULAR; INTRAVENOUS
Status: DISCONTINUED | OUTPATIENT
Start: 2023-11-13 | End: 2023-11-13

## 2023-11-13 RX ORDER — ROCURONIUM BROMIDE 10 MG/ML
INJECTION, SOLUTION INTRAVENOUS
Status: DISCONTINUED | OUTPATIENT
Start: 2023-11-13 | End: 2023-11-13

## 2023-11-13 RX ORDER — BENZONATATE 100 MG/1
200 CAPSULE ORAL 3 TIMES DAILY
Status: DISCONTINUED | OUTPATIENT
Start: 2023-11-13 | End: 2023-11-14 | Stop reason: HOSPADM

## 2023-11-13 RX ORDER — MUPIROCIN 20 MG/G
OINTMENT TOPICAL
Status: DISCONTINUED | OUTPATIENT
Start: 2023-11-13 | End: 2023-11-13 | Stop reason: HOSPADM

## 2023-11-13 RX ADMIN — MIDAZOLAM HYDROCHLORIDE 2 MG: 1 INJECTION INTRAMUSCULAR; INTRAVENOUS at 01:11

## 2023-11-13 RX ADMIN — OCTREOTIDE ACETATE 100 MCG: 100 INJECTION, SOLUTION INTRAVENOUS; SUBCUTANEOUS at 07:11

## 2023-11-13 RX ADMIN — FENTANYL CITRATE 50 MCG: 50 INJECTION, SOLUTION INTRAMUSCULAR; INTRAVENOUS at 02:11

## 2023-11-13 RX ADMIN — SUGAMMADEX 200 MG: 100 INJECTION, SOLUTION INTRAVENOUS at 04:11

## 2023-11-13 RX ADMIN — DEXTROSE, SODIUM CHLORIDE, AND POTASSIUM CHLORIDE: 5; .45; .15 INJECTION INTRAVENOUS at 11:11

## 2023-11-13 RX ADMIN — ROCURONIUM BROMIDE 20 MG: 10 INJECTION, SOLUTION INTRAVENOUS at 02:11

## 2023-11-13 RX ADMIN — FENTANYL CITRATE 50 MCG: 50 INJECTION, SOLUTION INTRAMUSCULAR; INTRAVENOUS at 03:11

## 2023-11-13 RX ADMIN — PROPOFOL 150 MCG/KG/MIN: 10 INJECTION, EMULSION INTRAVENOUS at 02:11

## 2023-11-13 RX ADMIN — ROCURONIUM BROMIDE 20 MG: 10 INJECTION, SOLUTION INTRAVENOUS at 03:11

## 2023-11-13 RX ADMIN — LABETALOL HYDROCHLORIDE 5 MG: 5 INJECTION, SOLUTION INTRAVENOUS at 03:11

## 2023-11-13 RX ADMIN — ROCURONIUM BROMIDE 50 MG: 10 INJECTION, SOLUTION INTRAVENOUS at 02:11

## 2023-11-13 RX ADMIN — MIDAZOLAM HYDROCHLORIDE 1 MG: 1 INJECTION INTRAMUSCULAR; INTRAVENOUS at 03:11

## 2023-11-13 RX ADMIN — BENZONATATE 200 MG: 100 CAPSULE ORAL at 07:11

## 2023-11-13 RX ADMIN — CALCIUM CARBONATE (ANTACID) CHEW TAB 500 MG 500 MG: 500 CHEW TAB at 09:11

## 2023-11-13 RX ADMIN — AMPICILLIN AND SULBACTAM 3 G: 2; 1 INJECTION, POWDER, FOR SOLUTION INTRAVENOUS at 12:11

## 2023-11-13 RX ADMIN — SODIUM CHLORIDE, SODIUM LACTATE, POTASSIUM CHLORIDE, AND CALCIUM CHLORIDE: .6; .31; .03; .02 INJECTION, SOLUTION INTRAVENOUS at 02:11

## 2023-11-13 RX ADMIN — PROPOFOL 70 MG: 10 INJECTION, EMULSION INTRAVENOUS at 04:11

## 2023-11-13 RX ADMIN — AMPICILLIN AND SULBACTAM 3 G: 2; 1 INJECTION, POWDER, FOR SOLUTION INTRAVENOUS at 11:11

## 2023-11-13 RX ADMIN — LIDOCAINE HYDROCHLORIDE 100 MG: 20 INJECTION, SOLUTION INTRAVENOUS at 02:11

## 2023-11-13 RX ADMIN — FENTANYL CITRATE 100 MCG: 50 INJECTION, SOLUTION INTRAMUSCULAR; INTRAVENOUS at 02:11

## 2023-11-13 RX ADMIN — FENTANYL CITRATE 25 MCG: 50 INJECTION, SOLUTION INTRAMUSCULAR; INTRAVENOUS at 03:11

## 2023-11-13 RX ADMIN — ONDANSETRON 4 MG: 2 INJECTION, SOLUTION INTRAMUSCULAR; INTRAVENOUS at 01:11

## 2023-11-13 RX ADMIN — KETOROLAC TROMETHAMINE 30 MG: 30 INJECTION, SOLUTION INTRAMUSCULAR; INTRAVENOUS at 02:11

## 2023-11-13 RX ADMIN — ROCURONIUM BROMIDE 10 MG: 10 INJECTION, SOLUTION INTRAVENOUS at 03:11

## 2023-11-13 RX ADMIN — Medication 15 ML: at 09:11

## 2023-11-13 RX ADMIN — DIPHENHYDRAMINE HYDROCHLORIDE 12.5 MG: 50 INJECTION INTRAMUSCULAR; INTRAVENOUS at 02:11

## 2023-11-13 RX ADMIN — AMPICILLIN AND SULBACTAM 3 G: 2; 1 INJECTION, POWDER, FOR SOLUTION INTRAVENOUS at 09:11

## 2023-11-13 RX ADMIN — PROMETHAZINE HYDROCHLORIDE 25 MG: 6.25 SOLUTION ORAL at 09:11

## 2023-11-13 RX ADMIN — LEVOTHYROXINE SODIUM 125 MCG: 0.12 TABLET ORAL at 05:11

## 2023-11-13 RX ADMIN — IBUPROFEN 600 MG: 600 TABLET ORAL at 09:11

## 2023-11-13 RX ADMIN — OCTREOTIDE ACETATE 100 MCG: 100 INJECTION, SOLUTION INTRAVENOUS; SUBCUTANEOUS at 05:11

## 2023-11-13 RX ADMIN — DEXAMETHASONE SODIUM PHOSPHATE 4 MG: 4 INJECTION, SOLUTION INTRAMUSCULAR; INTRAVENOUS at 01:11

## 2023-11-13 RX ADMIN — DOCUSATE SODIUM 200 MG: 100 CAPSULE, LIQUID FILLED ORAL at 09:11

## 2023-11-13 RX ADMIN — SCOPALAMINE 1 PATCH: 1 PATCH, EXTENDED RELEASE TRANSDERMAL at 01:11

## 2023-11-13 RX ADMIN — CALCIUM CARBONATE (ANTACID) CHEW TAB 500 MG 500 MG: 500 CHEW TAB at 10:11

## 2023-11-13 RX ADMIN — PROPOFOL 200 MG: 10 INJECTION, EMULSION INTRAVENOUS at 02:11

## 2023-11-13 NOTE — PROGRESS NOTES
[facility] Surg  Otorhinolaryngology-Head & Neck Surgery  Progress Note    Patient Name: Michael Gonzalez  MRN: [id number]  Code Status: Full Code  Admission Date: 11/11/2023  Hospital Length of Stay: 2 days  Attending Physician: Daphne Vazquez MD  Primary Care Provider: Christos Land MD    Subjective:     Interval History: no acute events overnight. No breathing issues/SOB    Post-Op Info:  Procedure(s) (LRB):  INCISION AND DRAINAGE,NECK (Left)      Hospital Day: 3      Medications:  Continuous Infusions:   dextrose 5 % and 0.45 % NaCl with KCl 20 mEq 50 mL/hr at 11/12/23 2216     Scheduled Meds:   ampicillin-sulbactim (UNASYN) IVPB  3 g Intravenous Q8H    calcium carbonate  500 mg Oral BID    docusate sodium  200 mg Oral Daily    levothyroxine  125 mcg Oral Before breakfast    medium chain triglycerides  15 mL Oral TID    octreotide  100 mcg Subcutaneous Q8H     PRN Meds:acetaminophen, benzonatate, diphenhydrAMINE, HYDROcodone-acetaminophen, ibuprofen     Objective:     Vital Signs (24h Range):  Temp:  [98.2 °F (36.8 °C)-99 °F (37.2 °C)] 98.2 °F (36.8 °C)  Pulse:  [72-82] 75  Resp:  [] 18  SpO2:  [95 %-100 %] 98 %  BP: (110-128)/(70-81) 110/70     Date 11/13/23 0700 - 11/14/23 0659   Shift 7203-7055 8215-3433 3740-8884 24 Hour Total   INTAKE   P.O. 0   0   Shift Total(mL/kg) 0(0)   0(0)   OUTPUT   Shift Total(mL/kg)       Weight (kg) 79.8 79.8 79.8 79.8     Lines/Drains/Airways       Drain  Duration                  Closed/Suction Drain 10/31/23 1734 Tube - 1 Anterior Neck Bulb 10 Fr. 12 days         Closed/Suction Drain 10/31/23 1734 Tube - 2 Anterior Neck Bulb 10 Fr. 12 days         Closed/Suction Drain 10/31/23 1740 Tube - 3 Anterior Neck Bulb 10 Fr. 12 days              Peripheral Intravenous Line  Duration                  Peripheral IV - Single Lumen 11/11/23 1352 20 G Anterior;Left Forearm 1 day                    Physical Exam  HENT:      Head: Normocephalic.   Neck:     Pulmonary:       Effort: Pulmonary effort is normal.      Breath sounds: No stridor.      Comments: Voice normal   Neurological:      Mental Status: She is alert.         Significant Labs:  CBC:   Recent Labs   Lab 11/12/23  0428   WBC 5.67   RBC 3.91*   HGB 11.3*   HCT 34.4*      MCV 88   MCH 28.9   MCHC 32.8     CMP:   Recent Labs   Lab 11/13/23  0452      CALCIUM 9.0   ALBUMIN 3.4*   PROT 7.0      K 4.1   CO2 25      BUN 14   CREATININE 0.7   ALKPHOS 54*   ALT 20   AST 18   BILITOT 0.4       Significant Diagnostics:  CT: I have reviewed all pertinent results/findings within the past 24 hours and my personal findings are:  no evidence of spread of chyle in retropharyngeal space, mediastinum nor right neck     Assessment/Plan:     Active Diagnoses:    Diagnosis Date Noted POA    PRINCIPAL PROBLEM:  Injury of thoracic duct [S27.899A] 11/12/2023 Yes    Neck abscess [L02.11] 11/12/2023 Yes    Thyroid cancer [C73] 11/12/2023 Yes      Problems Resolved During this Admission:     Awaiting MCFA triglyceride order  Chyle leak diet  Electrolytes stable  Octreotide 100 mcg q8h   Unasyn q8h- tenderness and erythema improving    To or today for washout of neck and drain placement, possible repair of thoracic duct. Discussed pros/cons of duct repair. May not be able to see since on meds but will do trendlenberg and valsalve if I feel like there is not a lot of inflammation and could safely repair a defect if seen. Discussed about fragility of duct and if to inflammed would recommend second look procedure if does not improve with above conservative measures. Discussed about repeat aspiration vs drain placement. Was difficult to get a lot of fluid out and with amount on ct scan and lack of ability to withdraw a lot of fluid anterior to the scm on prior attempt I think it would be better to open the neck and wash out chyle to prevent infection or other wound complications . This will also allow for drain placement which  can help with monitoring of condition. Discussed risk of chylothorax and other potential complications if untreated    Dc planning pending findings in surgery today     Daphne Vazquez MD  Otorhinolaryngology-Head & Neck Surgery  [facility] [place] [facility]

## 2023-11-13 NOTE — NURSING
[facility]  Nurses Note -- 4 Eyes      11/13/2023       Skin assessed on: Q Shift      [x] No Pressure Injuries Present    []Prevention Measures Documented    [] Yes LDA  for Pressure Injury Previously documented     [] Yes New Pressure Injury Discovered   [] LDA for New Pressure Injury Added      Attending RN:  Ramona Liz LPN     Second RN:  NINA Marcelino

## 2023-11-13 NOTE — PLAN OF CARE
Case Management Assessment     PCP: Christos Land MD    Pharmacy: [facility]    Patient Arrived From: home with spouse and 3 dependent kids  Existing Help at Home: spouse and mother    Barriers to Discharge: requires medical care    Discharge Plan:    A. Home with family   B. Home with family and home health      Home with spouse and dependent children. Independent.  Spouse will drive patient home.          11/13/23 1501   Discharge Assessment   Assessment Type Discharge Planning Assessment   Confirmed/corrected address, phone number and insurance Yes   Confirmed Demographics Correct on Facesheet   Source of Information family   Communicated JIM with patient/caregiver Date not available/Unable to determine   People in Home child(mc), dependent   Do you expect to return to your current living situation? Yes   Do you have help at home or someone to help you manage your care at home? Yes   Who are your caregiver(s) and their phone number(s)? [phone] mother Liz Gonzalez and Milind Bragg (Significant other) [phone] (Mobile)   Prior to hospitilization cognitive status: Alert/Oriented   Current cognitive status: Alert/Oriented   Equipment Currently Used at Home none   Readmission within 30 days? No   Do you currently have service(s) that help you manage your care at home? No   Do you take prescription medications? Yes   Do you have prescription coverage? Yes   Do you have any problems affording any of your prescribed medications? No   Who is going to help you get home at discharge? [phone] Liz henao and Milind Bragg (Significant other) [phone] (Mobile)   How do you get to doctors appointments? family or friend will provide;car, drives self   Are you on dialysis? No   Do you take coumadin? No   DME Needed Upon Discharge  none   Discharge Plan discussed with: Parent(s);Spouse/sig other   Name(s) and Number(s) [phone] , Liz Gonzalez and Milind Bragg (Significant other)  [phone] (Mobile)   Transition of Care Barriers None   Discharge Plan A Home with family   Discharge Plan B Home with family   OTHER   Name(s) of People in Home [phone] mother, Liz Gonzalez and Milind Bragg (Significant other) [phone] (Mobile)

## 2023-11-13 NOTE — CONSULTS
"  [facility] - Med Surg  Adult Nutrition  Consult Note    SUMMARY     Recommendations    1. Consider high protein low fat diet.   2. Monitor fluid intake and electrolytes.   3. Collaboration with medical providers.   4. Minimize risks of nutrient deficiencies.    Goals: 1. Pt to meet % EEN/EPN by RD follow up  Nutrition Goal Status: new  Communication of RD Recs:  (POC)    Assessment and Plan    Nutrition Problem  Decreased nutrient needs; fat    Related to (etiology):   Diagnosis related symptoms    Signs and Symptoms (as evidenced by):   Chyle leak    Interventions/Recommendations (treatment strategy):  Collaboration with medical providers  Low fat; high protein diet    Nutrition Diagnosis Status:   New     Reason for Assessment    Reason For Assessment: consult  Diagnosis:  (injury of thoracic duct)  Relevant Medical History:   Past Medical History:   Diagnosis Date    Allergy     Anxiety     Gallstones     PCOS (polycystic ovarian syndrome)     General Information Comments: RD consult for patient with chyle leak. Pt must follow high protein very low fat diet. RD will continue to monitor for alternate means of nutrition. BMI 29.34, overweight. 5 lb weight loss x 1 day. Continue to monitor weights and weight changes.   Interdisciplinary Rounds: did not attend    Nutrition Risk Screen    Nutrition Risk Screen: no indicators present    Nutrition/Diet History    Food Allergies: NKFA    Anthropometrics    Temp: 98.6 °F (37 °C)  Height Method: Stated  Height: 5' 5" (165.1 cm)  Height (inches): 65 in  Weight Method: Bed Scale  Weight: 79.8 kg (176 lb)  Weight (lb): 176 lb  Ideal Body Weight (IBW), Female: 125 lb  % Ideal Body Weight, Female (lb): 140.8 %  BMI (Calculated): 29.3  BMI Grade: 25 - 29.9 - overweight       Lab/Procedures/Meds    Pertinent Labs Reviewed: reviewed  BMP  Lab Results   Component Value Date     11/13/2023    K 4.1 11/13/2023     11/13/2023    CO2 25 11/13/2023    BUN 14 " 11/13/2023    CREATININE 0.7 11/13/2023    CALCIUM 9.0 11/13/2023    ANIONGAP 7 (L) 11/13/2023    EGFRNORACEVR >60 11/13/2023      Pertinent Medications Reviewed: reviewed  Current Outpatient Medications   Medication Instructions    amoxicillin-clavulanate 875-125mg (AUGMENTIN) 875-125 mg per tablet 1 tablet, Oral, 2 times daily    azelastine (ASTELIN) 137 mcg, Nasal, 2 times daily    benzonatate (TESSALON) 200 mg, Oral, 3 times daily PRN    CALCIUM ANTACID 500 mg, Oral, 2 times daily    cyclobenzaprine (FLEXERIL) 5 mg, Oral, 3 times daily PRN    docusate sodium (COLACE) 200 mg, Oral, Daily    drospirenone-ethinyl estradioL (YOLY) 3-0.02 mg per tablet 1 tablet, Oral, Daily    fluticasone propionate (FLONASE) 100 mcg, Each Nostril, 2 times daily    ibuprofen (ADVIL,MOTRIN) 600 mg, Oral, Every 6 hours PRN    levothyroxine (SYNTHROID) 125 mcg, Oral, Before breakfast    naloxone (NARCAN) 4 mg/actuation Spry 4mg by nasal route as needed for opioid overdose; may repeat every 2-3 minutes in alternating nostrils until medical help arrives. Call 911    octreotide (SANDOSTATIN) 50 mcg, Subcutaneous, Every 12 hours    octreotide acetate (SANDOSTATIN) 0.05 mg, Subcutaneous, Every 12 hours    octreotide acetate (SANDOSTATIN) 0.05 mg, Subcutaneous, Every 8 hours    oxyCODONE-acetaminophen (PERCOCET) 5-325 mg per tablet 1 tablet, Oral, Every 4 hours PRN    promethazine-codeine 6.25-10 mg/5 ml (PHENERGAN WITH CODEINE) 6.25-10 mg/5 mL syrup 5 mLs, Oral, Every 6 hours PRN        Estimated/Assessed Needs    Weight Used For Calorie Calculations: 56.7 kg (125 lb)  Energy Calorie Requirements (kcal): 1679 kcal  Energy Need Method: [place] (1.3 ibw)  Protein Requirements: 57 g  Weight Used For Protein Calculations: 56.7 kg (125 lb)     Estimated Fluid Requirement Method: RDA Method  RDA Method (mL): 1679         Nutrition Prescription Ordered    Current Diet Order: npo    Evaluation of Received Nutrient/Fluid Intake    I/O:  +589  Energy Calories Required: meeting needs  Protein Required: meeting needs  Fluid Required: meeting needs  Comments: LBM 11/10/23  % Intake of Estimated Energy Needs: 0 - 25 %  % Meal Intake: NPO    Nutrition Risk    Level of Risk/Frequency of Follow-up: low       Monitor and Evaluation    Food and Nutrient Intake: food and beverage intake  Food and Nutrient Adminstration: diet order  Knowledge/Beliefs/Attitudes: food and nutrition knowledge/skill  Physical Activity and Function: nutrition-related ADLs and IADLs, factors affecting access to physical activity  Anthropometric Measurements: weight, weight change, body mass index  Biochemical Data, Medical Tests and Procedures: inflammatory profile  Nutrition-Focused Physical Findings: overall appearance       Nutrition Follow-Up    RD Follow-up?: Yes

## 2023-11-13 NOTE — NURSING
Patient returned to unit via stretcher.  Required assistance x2  by PACU nurse for transfer back to bed.  NAD noted.  Left neck bulb suction noted with red drainage.no c/o pain but did c/o feeling like she wants to cough but said she was informed to try not to cough.

## 2023-11-13 NOTE — ANESTHESIA PROCEDURE NOTES
Intubation    Date/Time: 11/13/2023 2:06 PM    Performed by: Nirmal Armstrong CRNA  Authorized by: Shila Gotti MD    Intubation:     Induction:  Intravenous    Intubated:  Postinduction    Mask Ventilation:  Easy mask    Attempts:  1    Attempted By:  CRNA    Method of Intubation:  Video laryngoscopy and direct    Blade:  Philip 3    Laryngeal View Grade: Grade IIA - cords partially seen      Difficult Airway Encountered?: No      Complications:  None    Airway Device:  Oral endotracheal tube    Airway Device Size:  7.0    Style/Cuff Inflation:  Cuffed    Inflation Amount (mL):  5    Tube secured:  21    Secured at:  The teeth    Placement Verified By:  Capnometry    Complicating Factors:  None    Findings Post-Intubation:  BS equal bilateral and atraumatic/condition of teeth unchanged

## 2023-11-13 NOTE — ANESTHESIA PREPROCEDURE EVALUATION
11/13/2023  Michael Gonzalez is a 28 y.o., female scheduled for I and D of neck      Past Medical History:   Diagnosis Date    Allergy     Anxiety     Gallstones     PCOS (polycystic ovarian syndrome)          Past Surgical History:   Procedure Laterality Date    DISSECTION OF NECK Bilateral 10/31/2023    Procedure: DISSECTION, NECK;  Surgeon: Daphne Vazquez MD;  Location: [facility] OR;  Service: ENT;  Laterality: Bilateral;  possible bilateral    THYROIDECTOMY, BILATERAL Bilateral 10/31/2023    Procedure: THYROIDECTOMY,BILATERAL;  Surgeon: Daphne Vazquez MD;  Location: [facility] OR;  Service: ENT;  Laterality: Bilateral;  NEUROMONITORING MIREILLE HAIDER [phone]  RN PREOP 10/26/2023           Pre-op Assessment    I have reviewed the Patient Summary Reports.     I have reviewed the Nursing Notes. I have reviewed the NPO Status.   I have reviewed the Medications.     Review of Systems  Anesthesia Hx:  No problems with previous Anesthesia             Denies Family Hx of Anesthesia complications.     Social:  Smoker, No Alcohol Use       Hematology/Oncology:  Hematology Normal                                 Oncology Comments: thyroid     EENT/Dental:  EENT/Dental Normal           Cardiovascular:  Exercise tolerance: good    Denies Hypertension.                Functional Capacity good / => 4 METS                         Pulmonary:  Pulmonary Normal                       Renal/:  Renal/ Normal                 Hepatic/GI:  Hepatic/GI Normal                 Musculoskeletal:  Musculoskeletal Normal                Neurological:      Headaches Denies Seizures.                                Endocrine:  Endocrine Normal            Dermatological:  Skin Normal    Psych:  Psychiatric Normal                    Physical Exam  General: Well nourished, Cooperative, Alert and Oriented    Airway:  Mallampati: II    Mouth Opening: Normal  TM Distance: Normal  Tongue: Normal  Neck ROM: Normal ROM    Dental:  Intact        Anesthesia Plan  Type of Anesthesia, risks & benefits discussed:    Anesthesia Type: Gen ETT  Intra-op Monitoring Plan: Standard ASA Monitors  Post Op Pain Control Plan: multimodal analgesia  Induction:  IV  Airway Plan: , Post-Induction  Informed Consent: Informed consent signed with the Patient and all parties understand the risks and agree with anesthesia plan.  All questions answered. Patient consented to blood products? Yes  ASA Score: 2  Anesthesia Plan Notes: PONV with last anesthetic.  Will do TIVA    Ready For Surgery From Anesthesia Perspective.     .

## 2023-11-13 NOTE — BRIEF OP NOTE
[facility] Surg  Surgery Department  Brief Operative Note    SUMMARY     Date of Procedure: 11/13/2023     Procedure: Procedure(s) (LRB):  INCISION AND DRAINAGE NECK, REPAIR THORACIC DUCT,WITH HARRIETT DRAIN (Left)     Surgeon(s) and Role:     * Daphne Vazquez MD - Primary    Assisting Surgeon: None    Pre-Operative Diagnosis: Neck swelling [R22.1]    Post-Operative Diagnosis: Post-Op Diagnosis Codes:     * Neck swelling [R22.1]    Anesthesia: General    Operative Findings (including complications, if any): chyle leaking from thoracic duct. Duct ligated carefully and tisseal placed on top, no additional drainage with valsalva and trendelenberg maneuvers. Extensive inflammation of neck and chyle pooled in neck - neck irrigated.   HARRIETT drain placed       Estimated Blood Loss (EBL): 100 mL           Implants: none    Specimens:   Specimen (24h ago, onward)      None                    Condition: Good    Disposition: PACU - hemodynamically stable.    Attestation: I was present and scrubbed for the entire procedure.

## 2023-11-13 NOTE — NURSING
Pt assisted to MATEUSZ malhotra, now leaving IFV stopped pt voided, mom at her side, pt now leaving with surgery transport,safety maintained.

## 2023-11-13 NOTE — PLAN OF CARE
Problem: Adult Inpatient Plan of Care  Goal: Plan of Care Review  Flowsheets (Taken 11/13/2023 1642)  Plan of Care Reviewed With:   patient   family   spouse  Goal: Absence of Hospital-Acquired Illness or Injury  Intervention: Identify and Manage Fall Risk  Flowsheets (Taken 11/13/2023 1642)  Safety Promotion/Fall Prevention:   assistive device/personal item within reach   nonskid shoes/socks when out of bed   high risk medications identified   instructed to call staff for mobility   side rails raised x 2   room near unit station   bed alarm set   Fall Risk reviewed with patient/family   medications reviewed  Intervention: Prevent Skin Injury  Flowsheets (Taken 11/13/2023 1642)  Body Position:   position changed independently   weight shifting  Skin Protection:   adhesive use limited   tubing/devices free from skin contact  Intervention: Prevent and Manage VTE (Venous Thromboembolism) Risk  Flowsheets (Taken 11/13/2023 1642)  Activity Management:   Ankle pumps - L1   Arm raise - L1   Rolling - L1   Straight leg raise - L1   Ambulated to bathroom - L4  VTE Prevention/Management:   ambulation promoted   bleeding precations maintained   bleeding risk assessed  Range of Motion: active ROM (range of motion) encouraged  Intervention: Prevent Infection  Flowsheets (Taken 11/13/2023 1642)  Infection Prevention: hand hygiene promoted     Problem: Fall Injury Risk  Goal: Absence of Fall and Fall-Related Injury  Intervention: Identify and Manage Contributors  Flowsheets (Taken 11/13/2023 1642)  Self-Care Promotion:   independence encouraged   BADL personal objects within reach   BADL personal routines maintained   safe use of adaptive equipment encouraged   meal set-up provided  Medication Review/Management:   medications reviewed   high-risk medications identified  Intervention: Promote Injury-Free Environment  Flowsheets (Taken 11/13/2023 1642)  Safety Promotion/Fall Prevention:   assistive device/personal item within  reach   nonskid shoes/socks when out of bed   high risk medications identified   instructed to call staff for mobility   side rails raised x 2   room near unit station   bed alarm set   Fall Risk reviewed with patient/family   medications reviewed     Problem: Infection  Goal: Absence of Infection Signs and Symptoms  Outcome: Ongoing, Progressing  Intervention: Prevent or Manage Infection  Flowsheets (Taken 11/13/2023 8351)  Infection Management: aseptic technique maintained     Pt alert able to make needs known,jesse meds well,IV abx remains in progress,no s/s adverse reaction noted,reposition self q 2hrs,pain controlled by prn pain medication,POC explained,remains free from falls and pressure injuries,safety maintained,continue monitoring.

## 2023-11-13 NOTE — NURSING
[facility], [facility]  Nurses Note -- 4 Eyes      11/12/2023       Skin assessed on: Q Shift      [x] No Pressure Injuries Present    []Prevention Measures Documented    [] Yes LDA  for Pressure Injury Previously documented     [] Yes New Pressure Injury Discovered   [] LDA for New Pressure Injury Added      Attending RN:  Chari Steven RN     Second RN:  NINA Gallardo

## 2023-11-13 NOTE — TRANSFER OF CARE
"Anesthesia Transfer of Care Note    Patient: Michael Gonzalez    Procedure(s) Performed: Procedure(s) (LRB):  INCISION AND DRAINAGE NECK, REPAIR THORACIC DUCT,WITH HARRIETT DRAIN (Left)    Patient location: PACU    Anesthesia Type: general    Transport from OR: Transported from OR on room air with adequate spontaneous ventilation    Post pain: adequate analgesia    Post assessment: no apparent anesthetic complications and tolerated procedure well    Post vital signs: stable    Level of consciousness: awake and alert    Nausea/Vomiting: no nausea/vomiting    Complications: none    Transfer of care protocol was followed      Last vitals: Visit Vitals  /65 (BP Location: Right arm, Patient Position: Lying)   Pulse 106   Temp 36.9 °C (98.5 °F) (Temporal)   Resp 16   Ht 5' 5" (1.651 m)   Wt 79.8 kg (176 lb)   LMP 10/19/2023 (Exact Date)   SpO2 99%   Breastfeeding No   BMI 29.29 kg/m²     "

## 2023-11-13 NOTE — PLAN OF CARE
Problem: Adult Inpatient Plan of Care  Goal: Patient-Specific Goal (Individualized)  Outcome: Ongoing, Progressing  Goal: Absence of Hospital-Acquired Illness or Injury  Outcome: Ongoing, Progressing  Goal: Optimal Comfort and Wellbeing  Outcome: Ongoing, Progressing     Problem: Fall Injury Risk  Goal: Absence of Fall and Fall-Related Injury  Outcome: Ongoing, Progressing     Problem: Infection  Goal: Absence of Infection Signs and Symptoms  Outcome: Ongoing, Progressing

## 2023-11-13 NOTE — OR NURSING
"Dr. Vazquez at bedside speaking with patient.  No new orders given.  Patient denies difficulty breathing, stats that her neck is "a little sore, but I'm ok". Denies nausea.  VSS  "

## 2023-11-13 NOTE — PLAN OF CARE
Recommendations    1. Consider high protein low fat diet.   2. Monitor fluid intake and electrolytes.   3. Collaboration with medical providers.   4. Minimize risks of nutrient deficiencies.    Goals: 1. Pt to meet % EEN/EPN by RD follow up  Nutrition Goal Status: new  Communication of RD Recs:  (POC)    Assessment and Plan    Nutrition Problem  Decreased nutrient needs; fat    Related to (etiology):   Diagnosis related symptoms    Signs and Symptoms (as evidenced by):   Chyle leak    Interventions/Recommendations (treatment strategy):  Collaboration with medical providers  Low fat; high protein diet    Nutrition Diagnosis Status:   New

## 2023-11-14 ENCOUNTER — TELEPHONE (OUTPATIENT)
Dept: OTOLARYNGOLOGY | Facility: CLINIC | Age: 28
End: 2023-11-14
Payer: MEDICAID

## 2023-11-14 VITALS
HEIGHT: 65 IN | SYSTOLIC BLOOD PRESSURE: 110 MMHG | DIASTOLIC BLOOD PRESSURE: 76 MMHG | BODY MASS INDEX: 29.32 KG/M2 | WEIGHT: 176 LBS | HEART RATE: 70 BPM | TEMPERATURE: 98 F | OXYGEN SATURATION: 100 % | RESPIRATION RATE: 17 BRPM

## 2023-11-14 LAB
ALBUMIN SERPL BCP-MCNC: 3.3 G/DL (ref 3.5–5.2)
ALP SERPL-CCNC: 49 U/L (ref 55–135)
ALT SERPL W/O P-5'-P-CCNC: 40 U/L (ref 10–44)
ANION GAP SERPL CALC-SCNC: 14 MMOL/L (ref 8–16)
AST SERPL-CCNC: 51 U/L (ref 10–40)
BILIRUB SERPL-MCNC: 0.2 MG/DL (ref 0.1–1)
BUN SERPL-MCNC: 12 MG/DL (ref 6–20)
CALCIUM SERPL-MCNC: 8.9 MG/DL (ref 8.7–10.5)
CHLORIDE SERPL-SCNC: 106 MMOL/L (ref 95–110)
CO2 SERPL-SCNC: 21 MMOL/L (ref 23–29)
CREAT SERPL-MCNC: 0.7 MG/DL (ref 0.5–1.4)
EST. GFR  (NO RACE VARIABLE): >60 ML/MIN/1.73 M^2
GLUCOSE SERPL-MCNC: 95 MG/DL (ref 70–110)
POTASSIUM SERPL-SCNC: 4.6 MMOL/L (ref 3.5–5.1)
PROT SERPL-MCNC: 7.2 G/DL (ref 6–8.4)
SODIUM SERPL-SCNC: 141 MMOL/L (ref 136–145)

## 2023-11-14 PROCEDURE — 80053 COMPREHEN METABOLIC PANEL: CPT | Performed by: OTOLARYNGOLOGY

## 2023-11-14 PROCEDURE — 94761 N-INVAS EAR/PLS OXIMETRY MLT: CPT

## 2023-11-14 PROCEDURE — 36415 COLL VENOUS BLD VENIPUNCTURE: CPT | Performed by: OTOLARYNGOLOGY

## 2023-11-14 PROCEDURE — 63600175 PHARM REV CODE 636 W HCPCS: Performed by: OTOLARYNGOLOGY

## 2023-11-14 PROCEDURE — 25000003 PHARM REV CODE 250: Performed by: OTOLARYNGOLOGY

## 2023-11-14 RX ORDER — BENZONATATE 200 MG/1
200 CAPSULE ORAL 3 TIMES DAILY
Qty: 30 CAPSULE | Refills: 0 | Status: SHIPPED | OUTPATIENT
Start: 2023-11-14 | End: 2023-11-24

## 2023-11-14 RX ORDER — OCTREOTIDE ACETATE 100 UG/ML
100 INJECTION, SOLUTION INTRAVENOUS; SUBCUTANEOUS EVERY 8 HOURS
Qty: 90 ML | Refills: 11 | Status: SHIPPED | OUTPATIENT
Start: 2023-11-14 | End: 2024-11-13

## 2023-11-14 RX ORDER — ONDANSETRON 2 MG/ML
8 INJECTION INTRAMUSCULAR; INTRAVENOUS EVERY 12 HOURS PRN
Status: DISCONTINUED | OUTPATIENT
Start: 2023-11-14 | End: 2023-11-14 | Stop reason: HOSPADM

## 2023-11-14 RX ORDER — OCTREOTIDE ACETATE 100 UG/ML
100 INJECTION, SOLUTION INTRAVENOUS; SUBCUTANEOUS EVERY 8 HOURS
Qty: 30 ML | Refills: 0 | Status: SHIPPED | OUTPATIENT
Start: 2023-11-14 | End: 2023-11-24

## 2023-11-14 RX ORDER — PROMETHAZINE HYDROCHLORIDE 6.25 MG/5ML
25 SYRUP ORAL EVERY 6 HOURS PRN
Qty: 473 ML | Refills: 0 | Status: SHIPPED | OUTPATIENT
Start: 2023-11-14

## 2023-11-14 RX ORDER — LEVOTHYROXINE SODIUM 125 UG/1
125 TABLET ORAL
Qty: 30 TABLET | Refills: 11 | Status: SHIPPED | OUTPATIENT
Start: 2023-11-15 | End: 2024-11-14

## 2023-11-14 RX ADMIN — CALCIUM CARBONATE (ANTACID) CHEW TAB 500 MG 500 MG: 500 CHEW TAB at 02:11

## 2023-11-14 RX ADMIN — DOCUSATE SODIUM 200 MG: 100 CAPSULE, LIQUID FILLED ORAL at 09:11

## 2023-11-14 RX ADMIN — AMPICILLIN AND SULBACTAM 3 G: 2; 1 INJECTION, POWDER, FOR SOLUTION INTRAVENOUS at 03:11

## 2023-11-14 RX ADMIN — ONDANSETRON 8 MG: 2 INJECTION INTRAMUSCULAR; INTRAVENOUS at 02:11

## 2023-11-14 RX ADMIN — LEVOTHYROXINE SODIUM 125 MCG: 0.12 TABLET ORAL at 07:11

## 2023-11-14 RX ADMIN — OCTREOTIDE ACETATE 100 MCG: 100 INJECTION, SOLUTION INTRAVENOUS; SUBCUTANEOUS at 04:11

## 2023-11-14 RX ADMIN — BENZONATATE 200 MG: 100 CAPSULE ORAL at 03:11

## 2023-11-14 RX ADMIN — AMPICILLIN AND SULBACTAM 3 G: 2; 1 INJECTION, POWDER, FOR SOLUTION INTRAVENOUS at 08:11

## 2023-11-14 RX ADMIN — CALCIUM CARBONATE (ANTACID) CHEW TAB 500 MG 500 MG: 500 CHEW TAB at 11:11

## 2023-11-14 RX ADMIN — OCTREOTIDE ACETATE 100 MCG: 100 INJECTION, SOLUTION INTRAVENOUS; SUBCUTANEOUS at 01:11

## 2023-11-14 RX ADMIN — PROMETHAZINE HYDROCHLORIDE 25 MG: 6.25 SOLUTION ORAL at 04:11

## 2023-11-14 RX ADMIN — BENZONATATE 200 MG: 100 CAPSULE ORAL at 09:11

## 2023-11-14 NOTE — NURSING
Pt resting quietly,NADN no c/o pain, suction bulb drain remains intact staples intact to left neck incision,  pt incision to the rest of neck remains intact, no redness or drainage minimal amt swelling noted,continue monitoring.

## 2023-11-14 NOTE — NURSING
[facility], [facility]  Nurses Note -- 4 Eyes      11-13-23      Skin assessed on: Q Shift      [x] No Pressure Injuries Present    []Prevention Measures Documented    [] Yes LDA  for Pressure Injury Previously documented     [] Yes New Pressure Injury Discovered   [] LDA for New Pressure Injury Added      Attending RN:  Sindhu Otero, RN     Second RN:  Ramona Liz LPN

## 2023-11-14 NOTE — OP NOTE
[facility] Surg  Surgery Department  Operative Note    SUMMARY     Date of Procedure: 11/13/2023     Procedure: Procedure(s) (LRB):  INCISION AND DRAINAGE NECK, REPAIR THORACIC DUCT,WITH HARRIETT DRAIN (Left)     Surgeon(s) and Role:     * Daphne Vazquez MD - Primary    Assisting Surgeon: None    Pre-Operative Diagnosis: Neck swelling [R22.1]    Post-Operative Diagnosis: Post-Op Diagnosis Codes:     * Neck swelling [R22.1]    Anesthesia: General    Operative Findings (including complications, if any):  chyle leaking from thoracic duct. Duct ligated carefully and tisseal placed on top, no additional drainage with valsalva and trendelenberg maneuvers. Extensive inflammation of neck and chyle pooled in neck - neck irrigated.     HARRIETT drain placed         Estimated Blood Loss (EBL): 100 mL    Description of Technical Procedures:   The patient was taken to the operating room and placed supine on the operating room table.general anesthesia was induced. The patient was prepped and draped in the usual fashion.  A time out was performed and the correct patient and procedure were identified.   Bovie electrocautery was used to incise the skin through the previous neck dissection incision on the left side. The sternocliedomastoid muscle was identified and very indurate and inflammed. The edge of the muscle was identified and carefully dissected to access the deep space of the neck. Chyle was encountered. This was irrigated with saline and suctioned. The neck dissection bed was opened and the tissue was very inflammed around the jugular vein and carotid artery but there was an obvious area of leakage from the region of the thoracic duct. Careful dissection was performed and deemed safe due to ability to establish normal anatomic planes despite extreme inflammation. The source of chyle extravasation was identified and isolated and ligated with staples. Valsalva maneuver was done and identified additional area of small leakage  that was happening intermittently with valsalva maneuvers. This area was also able to be carefully isolated and ligated. Repeat valsalva maneuvers and reverse trendelenburg maneuvers did not show any additional areas of chyle extravasation. Vistaseal was placed in the neck over the repair sites. A 10 flat HARRIETT drain was placed into the wound bed and brought out through the skin and sutured to the skin with 3-0 nylon. The subcutaneous layers were closed with vicryl suture and the skin closed with staples. The patient was handed back over to anesthesia for awakening. There were no complications.     Significant Surgical Tasks Conducted by the Assistant(s), if Applicable: none    Estimated Blood Loss (EBL): 100 mL           Implants: * No implants in log *    Specimens:   Specimen (24h ago, onward)      None                    Condition: Good    Disposition: PACU - hemodynamically stable.    Attestation: I was present and scrubbed for the entire procedure.

## 2023-11-14 NOTE — TELEPHONE ENCOUNTER
----- Message from Chinyere Brown sent at 11/14/2023  2:32 PM CST -----  Type:  Sooner Appointment Request    Patient is requesting a sooner appointment.  Patient declined first available appointment listed as well as another facility and provider .  Patient will not accept being placed on the waitlist and is requesting a message be sent to doctor.    Name of Caller:  Case Management    When is the first available appointment?      Symptoms: Hospital follow up visit    Would the patient rather a call back or a response via [facility]? call    Best Call Back Number: .[phone] (home) [phone] (work)

## 2023-11-14 NOTE — TELEPHONE ENCOUNTER
----- Message from Mallika Duque sent at 11/14/2023 11:30 AM CST -----  Contact: KIKI KEN [[id number]]  Type: Call Back      Who called: KIKI KEN [[id number]]      What is the request in detail: Patient is requesting a call back from [facility]. She did not disclose the reason for the call.   Please advise.     [facility]      Would the patient rather a call back or a response via [facility]? Call back       Best call back number: [phone]    Additional Information:

## 2023-11-14 NOTE — TELEPHONE ENCOUNTER
"----- Message from Jamshid Saha PharmD sent at 11/14/2023 12:54 PM CST -----  Regarding: RE: octreotide therapy  Hi Jaqueline. Sent you a secure chart message due to urgency, but we can fill the octreotide, as long as it is the vial product, not the syringes. Turns out Medicaid only likes vials. Her copay would only be an estimated $1.33. My apologies for the confusion. We would just need a new script sent to [facility] for the injection solution (vials). Thank you in advance for your help.    Warmest Regards,    Jamshid Saha, Pharm D.  Clinical Pharmacist, [facility]  [address] [place], [address]    ----- Message -----  From: Jaqueline Berkowitz LPN  Sent: 11/13/2023   2:19 PM CST  To: Jamshid Saha PharmD  Subject: RE: octreotide therapy                           Good Evening, per Dr. Vazquez pt needs to be on this medication Octreotide 100 mcg q 8 hours x 14 days, she said if a peer to peer needs to be done she is willing to do it, can you help with this or tell me what we can do to get this approved?   Thanks MORGAN Parsons  ----- Message -----  From: Jamshid Saha PharmD  Sent: 11/13/2023   2:00 PM CST  To: Daphne Vazquez MD; #  Subject: octreotide therapy                               Hi Dr. Vazquez. Hope you are well. I'm reaching out to let you know that I submitted a PA for Ms. Gonzalez's octreotide, but it looks like her insurance will not cover it under her pharmacy benefit. [employer] gave me the following message after submitting the PA, "OCTREOTIDE ACETATE 50 MCG/ML SYRINGE is not covered for this Beneficiary. For covered alternatives, please view the preferred drug list at [email or web address]." There is a preferred drug list, but it is organized by disease state, so I'm not sure what an alternative would be, since we were using octreotide off label. Thank you in advance for your time.    Warmest Regards,    Jamshid Saha Pharm " MELINDA.  Clinical Pharmacist, [facility]  [address] [place], [address]

## 2023-11-14 NOTE — PLAN OF CARE
Follow-up Information       Christos Land MD. Call.    Specialty: Family Medicine  Why: ON WAIT LIST FOR OFFICE TO CALL PATIENT WITH A HOSPITAL FOLLOW UP APPOINTMENT.  Contact information:  [address] [address] [phone]               Daphne Vazquez MD Follow up.    Specialty: Otolaryngology  Why: Dr. Vazquez to call patient with a follow up appt due to patient having medicaid.  Contact information:  [address]  [place] [address] [phone]

## 2023-11-14 NOTE — TELEPHONE ENCOUNTER
Spoke with Jamshid at [facility], states can do vials of Octreotide, some confusion on his part, states new script put in today for, He will reach out to pt to let her know copay will be 1.33 and they will have carrier bring it to pt.

## 2023-11-14 NOTE — PLAN OF CARE
Problem: Adult Inpatient Plan of Care  Goal: Plan of Care Review  Outcome: Ongoing, Progressing  Goal: Patient-Specific Goal (Individualized)  Outcome: Ongoing, Progressing  Goal: Optimal Comfort and Wellbeing  Outcome: Ongoing, Progressing  Goal: Readiness for Transition of Care  Outcome: Ongoing, Progressing     Problem: Fall Injury Risk  Goal: Absence of Fall and Fall-Related Injury  Outcome: Ongoing, Progressing     Problem: Infection  Goal: Absence of Infection Signs and Symptoms  Outcome: Ongoing, Progressing

## 2023-11-14 NOTE — PLAN OF CARE
TN sent a secure chat to med surg nurse Kenya that this patient is clear for discharge from case management's .   11/14/23 6733   Final Note   Assessment Type Final Discharge Note   Anticipated Discharge Disposition Home   What phone number can be called within the next 1-3 days to see how you are doing after discharge?   (see chart)   Hospital Resources/Appts/Education Provided Appointments scheduled and added to AVS   Post-Acute Status   Discharge Delays None known at this time

## 2023-11-15 NOTE — PLAN OF CARE
Problem: Adult Inpatient Plan of Care  Goal: Plan of Care Review  Outcome: Adequate for Care Transition  Goal: Patient-Specific Goal (Individualized)  Outcome: Adequate for Care Transition  Goal: Absence of Hospital-Acquired Illness or Injury  Outcome: Adequate for Care Transition  Goal: Optimal Comfort and Wellbeing  Outcome: Adequate for Care Transition  Goal: Readiness for Transition of Care  Outcome: Adequate for Care Transition     Problem: Fall Injury Risk  Goal: Absence of Fall and Fall-Related Injury  Outcome: Adequate for Care Transition     Problem: Infection  Goal: Absence of Infection Signs and Symptoms  Outcome: Adequate for Care Transition

## 2023-11-15 NOTE — NURSING
Pt states she understands teaching done by virtual d/c nurse,was assisted off unit by staff CNA with her  at her side,safety maintain.

## 2023-11-15 NOTE — ANESTHESIA POSTPROCEDURE EVALUATION
Anesthesia Post Evaluation    Patient: Michael Gonzalez    Procedure(s) Performed: Procedure(s) (LRB):  INCISION AND DRAINAGE NECK, REPAIR THORACIC DUCT,WITH HARRIETT DRAIN (Left)    Final Anesthesia Type: general      Patient location during evaluation: PACU  Patient participation: Yes- Able to Participate  Level of consciousness: awake and alert and oriented  Post-procedure vital signs: reviewed and stable  Pain management: adequate  Airway patency: patent    PONV status at discharge: No PONV  Anesthetic complications: no      Cardiovascular status: blood pressure returned to baseline, hemodynamically stable and stable  Respiratory status: unassisted, spontaneous ventilation and room air  Hydration status: euvolemic  Follow-up not needed.          Vitals Value Taken Time   /76 11/14/23 1730   Temp 36.8 °C (98.3 °F) 11/14/23 1730   Pulse 70 11/14/23 1730   Resp 17 11/14/23 1730   SpO2 100 % 11/14/23 1730         Event Time   Out of Recovery 11/13/2023 17:13:29         Pain/Jan Score: Jan Score: 10 (11/14/2023  8:15 AM)

## 2023-11-16 ENCOUNTER — OFFICE VISIT (OUTPATIENT)
Dept: OTOLARYNGOLOGY | Facility: CLINIC | Age: 28
End: 2023-11-16
Payer: MEDICAID

## 2023-11-16 VITALS — WEIGHT: 176 LBS | HEIGHT: 65 IN | BODY MASS INDEX: 29.32 KG/M2

## 2023-11-16 DIAGNOSIS — R22.1 NECK SWELLING: Primary | ICD-10-CM

## 2023-11-16 PROCEDURE — 99024 POSTOP FOLLOW-UP VISIT: CPT | Mod: S$GLB,,, | Performed by: OTOLARYNGOLOGY

## 2023-11-16 PROCEDURE — 99024 PR POST-OP FOLLOW-UP VISIT: ICD-10-PCS | Mod: S$GLB,,, | Performed by: OTOLARYNGOLOGY

## 2023-11-16 NOTE — DISCHARGE SUMMARY
[facility] Surg  Otorhinolaryngology-Head & Neck Surgery  Discharge Summary      Patient Name: Michael Gonzalez  MRN: [id number]  Admission Date: 11/11/2023  Hospital Length of Stay: 3 days  Discharge Date and Time:  11/15/2023 6:55 PM  Attending Physician: No att. providers found   Discharging Provider: Juliet Dong MD  Primary Care Provider: Christos Land MD     HPI: admitted for chyle leak for octreotide and surgical mgmt    Procedure(s) (LRB):  INCISION AND DRAINAGE NECK, REPAIR THORACIC DUCT,WITH HARRIETT DRAIN (Left)     Hospital Course: monitored in hospital for chyle leak and to receive nutritional assistance , electrolyte monitoring and octreotide therapy. HARRIETT drain had been removed and patient had delayed presentation of chyle duct leakage after severe coughing episode. Decision made to take to or for washout and drain placement and repair of duct if able to do so safely . Drain placement decided to help monitor response to octreotide and to help with determining if chyle was still leaking. During washout procedure with drain placement, obvious source of chyle duct leakage was noted and was able to be safely addressed despite extensive inflammatory changes in the neck . Patient was continued on octreotide and monitored overnight. Given extent of leakage recommend continue octreotide for full course out of abundance of precaution . Postoperatively drain did not show any evidence of chyle but due to prior delayed nature of presentation recommend multiple modalities of therapy. She will continue on octreotide as outpatient and have close and frequent follow up in clinic .     Consults:   Consults (From admission, onward)          Status Ordering Provider     Inpatient consult to Registered Dietitian/Nutritionist  Once        Provider:  (Not yet assigned)    Completed JULIET DONG            Significant Diagnostic Studies: Labs; ct scan pre surgery     Pending Diagnostic Studies:       None           Final Active Diagnoses:    Diagnosis Date Noted POA    PRINCIPAL PROBLEM:  Injury of thoracic duct [S27.899A] 11/12/2023 Yes    Neck abscess [L02.11] 11/12/2023 Yes    Thyroid cancer [C73] 11/12/2023 Yes      Problems Resolved During this Admission:      Discharged Condition: good    Disposition: Home or Self Care    Follow Up:   Follow-up Information       Christos Land MD. Call.    Specialty: Family Medicine  Why: ON WAIT LIST FOR OFFICE TO CALL PATIENT WITH A HOSPITAL FOLLOW UP APPOINTMENT.  Contact information:  [address] [address]  [phone]               Daphne Vazquez MD Follow up on 11/16/2023.    Specialty: Otolaryngology  Why: at 3:15 pm hospital follow up  Contact information:  [address] [place] [id number] [phone]                           Patient Instructions:      Other restrictions (specify):   Order Comments: No lifting over 5 pounds, no straining     Medications:  Reconciled Home Medications:      Medication List        START taking these medications      medium chain triglycerides 7.7 kcal/mL oil  Commonly known as: MCT OIL  Take 15 mLs by mouth 3 (three) times daily. for 10 days     * octreotide acetate 100 mcg/mL (1 mL) Syrg  Commonly known as: SANDOSTATIN  Inject 1 mL (0.1 mg total) into the skin every 8 (eight) hours. for 10 days  Replaces: octreotide acetate 50 mcg/mL (1 mL) Syrg     * octreotide acetate 100 mcg/mL (1 mL) Syrg  Commonly known as: SANDOSTATIN  Inject 1 mL (0.1 mg total) into the skin every 8 (eight) hours. for 10 days  Replaces: octreotide 50 mcg/mL Soln     * octreotide 100 mcg/mL Soln  Commonly known as: SANDOSTATIN  Inject 1 mL (100 mcg total) into the skin every 8 (eight) hours for 10 days in a row     promethazine 6.25 mg/5 mL syrup  Commonly known as: PHENERGAN  Take 20 mLs (25 mg total) by mouth every 6 (six) hours as needed (cough).           * This list has 3 medication(s) that are the same as other medications prescribed for  you. Read the directions carefully, and ask your doctor or other care provider to review them with you.                CHANGE how you take these medications      benzonatate 200 MG capsule  Commonly known as: TESSALON  Take 1 capsule (200 mg total) by mouth 3 (three) times daily. for 10 days  What changed:   when to take this  reasons to take this            CONTINUE taking these medications      azelastine 137 mcg (0.1 %) nasal spray  Commonly known as: ASTELIN  1 spray (137 mcg total) by Nasal route 2 (two) times daily.     cyclobenzaprine 5 MG tablet  Commonly known as: FLEXERIL  Take 1 tablet (5 mg total) by mouth 3 (three) times daily as needed for Muscle spasms.     drospirenone-ethinyl estradioL 3-0.02 mg per tablet  Commonly known as: YOLY  Take 1 tablet by mouth once daily.     fluticasone propionate 50 mcg/actuation nasal spray  Commonly known as: FLONASE  2 sprays (100 mcg total) by Each Nostril route 2 (two) times daily.     levothyroxine 125 MCG tablet  Commonly known as: SYNTHROID  Take 1 tablet (125 mcg total) by mouth before breakfast.            STOP taking these medications      amoxicillin-clavulanate 875-125mg 875-125 mg per tablet  Commonly known as: AUGMENTIN     CALCIUM ANTACID 200 mg calcium (500 mg) chewable tablet  Generic drug: calcium carbonate     docusate sodium 100 MG capsule  Commonly known as: COLACE     ibuprofen 600 MG tablet  Commonly known as: ADVIL,MOTRIN     naloxone 4 mg/actuation Spry  Commonly known as: NARCAN     octreotide 50 mcg/mL Soln  Commonly known as: SandoSTATIN  Replaced by: octreotide acetate 100 mcg/mL (1 mL) Syrg     octreotide acetate 50 mcg/mL (1 mL) Syrg  Commonly known as: SANDOSTATIN  Replaced by: octreotide acetate 100 mcg/mL (1 mL) Syrg     oxyCODONE-acetaminophen 5-325 mg per tablet  Commonly known as: PERCOCET     promethazine-codeine 6.25-10 mg/5 ml 6.25-10 mg/5 mL syrup  Commonly known as: PHENERGAN with CODEINE              Daphne Vazquez,  MD  Otorhinolaryngology-Head & Neck Surgery  [facility] Surg

## 2023-11-16 NOTE — PROGRESS NOTES
F/u after hospital stay  Had delayed chyle leak- admitted and taken to or for washout drain placment and repair of thoracic duct    Peter drain with serosang output  No chyle present      Continue octreotide and peter drain  F/u Monday , sooner If issue

## 2023-11-20 ENCOUNTER — OFFICE VISIT (OUTPATIENT)
Dept: OTOLARYNGOLOGY | Facility: CLINIC | Age: 28
End: 2023-11-20
Payer: MEDICAID

## 2023-11-20 DIAGNOSIS — S27.899D: Primary | ICD-10-CM

## 2023-11-20 DIAGNOSIS — C80.1 PAPILLARY CARCINOMA: ICD-10-CM

## 2023-11-20 PROCEDURE — 99024 PR POST-OP FOLLOW-UP VISIT: ICD-10-PCS | Mod: S$GLB,,, | Performed by: OTOLARYNGOLOGY

## 2023-11-20 PROCEDURE — 99024 POSTOP FOLLOW-UP VISIT: CPT | Mod: S$GLB,,, | Performed by: OTOLARYNGOLOGY

## 2023-11-21 ENCOUNTER — PATIENT MESSAGE (OUTPATIENT)
Dept: OTOLARYNGOLOGY | Facility: CLINIC | Age: 28
End: 2023-11-21
Payer: MEDICAID

## 2023-11-22 NOTE — PROGRESS NOTES
Drain with minimal output. No neck swelling nor tenderness  Slight rash along upper chest unclear etiology    Stop octreotide. Slow reintroduction of fat into diet. If no chyle type drainage continue to advance to regular diet. If chyle present restart octreotide and fat free diet. She will keep me updated with photo of drain daily   Rtc next Monday for drain removal if no evidence of chyle leak

## 2023-11-27 ENCOUNTER — OFFICE VISIT (OUTPATIENT)
Dept: OTOLARYNGOLOGY | Facility: CLINIC | Age: 28
End: 2023-11-27
Payer: MEDICAID

## 2023-11-27 VITALS — WEIGHT: 176 LBS | BODY MASS INDEX: 29.32 KG/M2 | HEIGHT: 65 IN

## 2023-11-27 DIAGNOSIS — C80.1 PAPILLARY CARCINOMA: ICD-10-CM

## 2023-11-27 DIAGNOSIS — S27.899D: Primary | ICD-10-CM

## 2023-11-27 PROCEDURE — 1159F PR MEDICATION LIST DOCUMENTED IN MEDICAL RECORD: ICD-10-PCS | Mod: CPTII,S$GLB,, | Performed by: OTOLARYNGOLOGY

## 2023-11-27 PROCEDURE — 99024 PR POST-OP FOLLOW-UP VISIT: ICD-10-PCS | Mod: S$GLB,,, | Performed by: OTOLARYNGOLOGY

## 2023-11-27 PROCEDURE — 1159F MED LIST DOCD IN RCRD: CPT | Mod: CPTII,S$GLB,, | Performed by: OTOLARYNGOLOGY

## 2023-11-27 PROCEDURE — 99024 POSTOP FOLLOW-UP VISIT: CPT | Mod: S$GLB,,, | Performed by: OTOLARYNGOLOGY

## 2023-11-28 ENCOUNTER — CLINICAL SUPPORT (OUTPATIENT)
Dept: REHABILITATION | Facility: HOSPITAL | Age: 28
End: 2023-11-28
Payer: MEDICAID

## 2023-11-28 DIAGNOSIS — R29.898 WEAKNESS OF SHOULDER: ICD-10-CM

## 2023-11-28 DIAGNOSIS — G89.29 CHRONIC PAIN OF BOTH SHOULDERS: Primary | ICD-10-CM

## 2023-11-28 DIAGNOSIS — M25.612 DECREASED RANGE OF MOTION OF LEFT SHOULDER: ICD-10-CM

## 2023-11-28 DIAGNOSIS — Z74.09 DECREASED STRENGTH, ENDURANCE, AND MOBILITY: ICD-10-CM

## 2023-11-28 DIAGNOSIS — R68.89 DECREASED STRENGTH, ENDURANCE, AND MOBILITY: ICD-10-CM

## 2023-11-28 DIAGNOSIS — M25.611 DECREASED RIGHT SHOULDER RANGE OF MOTION: ICD-10-CM

## 2023-11-28 DIAGNOSIS — M25.511 CHRONIC PAIN OF BOTH SHOULDERS: Primary | ICD-10-CM

## 2023-11-28 DIAGNOSIS — R53.1 DECREASED STRENGTH, ENDURANCE, AND MOBILITY: ICD-10-CM

## 2023-11-28 DIAGNOSIS — M25.512 CHRONIC PAIN OF BOTH SHOULDERS: Primary | ICD-10-CM

## 2023-11-28 PROCEDURE — 97110 THERAPEUTIC EXERCISES: CPT | Mod: PN,CQ

## 2023-11-28 NOTE — PROGRESS NOTES
"  Physical Therapy Daily Treatment Note     Name: Michael Gonzalez  Clinic Number: [id number]    Therapy Diagnosis:   Encounter Diagnoses   Name Primary?    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 She's really sore on rigth more than left. The left shoulder is just weaker.    She was somewhat compliant with home exercise program.  Response to previous treatment: initial eval   Functional change: ongoing    Pain: 8/10  Location: bilateral shoulders  ( R worse than L)     Objective     Michael received therapeutic exercises to develop strength, endurance, ROM, flexibility, posture, and core stabilization for 50 minutes including:    Trini's flexion 3 min  HOB elevated supine shoulder flexion 1# wand  AAROM  2x10  HOB elevated supine chest press 1# wand AAROM  2x10  Sub-Max Isometrics 10x5"/ea (Flex/Abd, IR, ER)  Scap Squeezes 2x10, 3"  UT Stretch 3x30"/ea  Lev Scap Stretch 3x30"/ea  Cervical Rotation SNAGS 3x30"/ea (difficulty lifting arm to perform exercise)  Supine Cervical Rotations 2x10/ea    Michael received the following manual therapy techniques: Joint mobilizations were applied to the: BUE for 10 minutes, including:  PROM Bilateral shoulders "     Michael received cold pack for 00 minutes to 00.      Home Exercises Provided and Patient Education Provided     Education provided:   Cont to perform HEP as provided.     Written Home Exercises Provided: Patient instructed to cont prior HEP.  Exercises were reviewed and Michael was able to demonstrate them prior to the end of the session.  Michael demonstrated good  understanding of the education provided.     See EMR under Patient Instructions for exercises provided prior visit.    Assessment     Pt tolerated the above tx session well, but with moderate c/o pain. TherEx presented an appropriate challenge with minimal adverse effects reported. Verbal and tactile cueing required throughout for proper form and technique. Encouraged pt to continue HEP between sessions for optimal therapeutic gains. Will continue working towards established PT goals in future sessions.     Michael Is progressing well towards her goals.   Pt prognosis is Good.     Pt will continue to benefit from skilled outpatient physical therapy to address the deficits listed in the problem list box on initial evaluation, provide pt/family education and to maximize pt's level of independence in the home and community environment.     Pt's spiritual, cultural and educational needs considered and pt agreeable to plan of care and goals.    Anticipated barriers to physical therapy: Scheduling issues    Goals:  Short Term Goals: 4 weeks  1.Report decreased in pain at worse less than  <   / =  5  /10  to increase tolerance for functional mobility. On going  2. Pt to improve B shoulders range of motion by 25% to allow for improved functional mobility to allow for improvement in IADLs. On going  3. Increased B shoulders  MMT 1/2 grade to increase tolerance for ADL and work activities. On going  4. Pt to report be conscious of impaired sitting and standing posture daily to decrease pain. On going  5. Pt to tolerate HEP to improve ROM and independence  with ADL's. On going     Long Term Goals: 8 weeks  1.Report decreased in pain at worse less than  <   / =  2  /10  to increase tolerance for functional mobility. On going  2.  Patient will demonstrate improved overall function per FOTO Survey to CI = at least 1% but < 20% impaired, limited or restricted score or less.On going  3.Increased B shoulders  MMT 1 grade to increase tolerance for ADL and work activities.On going  4. Pt to report and demonstrate proper posture in standing and sitting to decrease pain. On going  5. Pt to be Independent with HEP to improve ROM and independence with ADL's.On going  6. Pt to improve B shoulders  range of motion by 75% to allow for improved functional mobility to allow for improvement in IADLs. On going    Plan       Cont skilled PT session towards PT and patient's goals.    Cheri Chowdhury, PTA   11/28/2023

## 2023-12-04 NOTE — PROGRESS NOTES
Neck soft  Has been on regular diet and off of octreotide. No signifcant drain output. No evidence of chyle leak    Ok to start radioactive iodine  Jhon drain removed today without issue  F/u for repeat exam sooner if issue

## 2023-12-05 ENCOUNTER — CLINICAL SUPPORT (OUTPATIENT)
Dept: REHABILITATION | Facility: HOSPITAL | Age: 28
End: 2023-12-05
Payer: MEDICAID

## 2023-12-05 DIAGNOSIS — G89.29 CHRONIC PAIN OF BOTH SHOULDERS: Primary | ICD-10-CM

## 2023-12-05 DIAGNOSIS — R29.898 WEAKNESS OF SHOULDER: ICD-10-CM

## 2023-12-05 DIAGNOSIS — R68.89 DECREASED STRENGTH, ENDURANCE, AND MOBILITY: ICD-10-CM

## 2023-12-05 DIAGNOSIS — Z74.09 DECREASED STRENGTH, ENDURANCE, AND MOBILITY: ICD-10-CM

## 2023-12-05 DIAGNOSIS — R53.1 DECREASED STRENGTH, ENDURANCE, AND MOBILITY: ICD-10-CM

## 2023-12-05 DIAGNOSIS — M25.511 CHRONIC PAIN OF BOTH SHOULDERS: Primary | ICD-10-CM

## 2023-12-05 DIAGNOSIS — M25.612 DECREASED RANGE OF MOTION OF LEFT SHOULDER: ICD-10-CM

## 2023-12-05 DIAGNOSIS — M25.512 CHRONIC PAIN OF BOTH SHOULDERS: Primary | ICD-10-CM

## 2023-12-05 DIAGNOSIS — M25.611 DECREASED RIGHT SHOULDER RANGE OF MOTION: ICD-10-CM

## 2023-12-05 PROCEDURE — 97110 THERAPEUTIC EXERCISES: CPT | Mod: PN

## 2023-12-05 NOTE — PROGRESS NOTES
"  Physical Therapy Daily Treatment Note     Name: Michael Gonzalez  Clinic Number: [id number]    Therapy Diagnosis:   Encounter Diagnoses   Name Primary?    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 Pt states she still have L shoulder pain. R shoulder pain is getting better. R shoulder pain is about 7/10 and L shoulder pain is about 2/10    She was somewhat compliant with home exercise program.  Response to previous treatment: initial eval   Functional change: ongoing    Pain: 7/10  Location: bilateral shoulders  ( R worse than L)     Objective     Michael received therapeutic exercises to develop strength, endurance, ROM, flexibility, posture, and core stabilization for 53 minutes including:    Trini's flexion 3 min  Pulley's abd 3 min   HOB elevated supine shoulder flexion 1# wand  AAROM  2x10  HOB elevated supine chest press 1# wand AAROM  2x10  Supine shoulder ER AAROM 1# wand 2x10 ea  Seated table slide flexion 2x10   Seated table slide abd 2x10  UT Stretch 3x30"/ea  Lev Scap Stretch 3x30"/ea      Michael received the following manual therapy techniques: Joint mobilizations were applied to the: BUE for 2 minutes, including:  R shoulder joint mobs "     Michael received cold pack for 00 minutes to 00.      Home Exercises Provided and Patient Education Provided     Education provided:   Cont to perform HEP as provided.     Written Home Exercises Provided: Patient instructed to cont prior HEP.  Exercises were reviewed and Michael was able to demonstrate them prior to the end of the session.  Michael demonstrated good  understanding of the education provided.     See EMR under Patient Instructions for exercises provided prior visit.    Assessment     Pt tolerated the above tx session well, but with moderate c/o pain. Exercises was regressed today. All exercises were performed pain free. Pt responded well. R shoulder worse than L shoulder. Pt required mod v/c's to perform exercises with proper form and proper muscles activation. Joint mobs performed to R shoulder. Pt will be progressed slowly in therapy to avoid aggravation of pain. Will continue working towards established PT goals in future sessions.     Michael Is progressing well towards her goals.   Pt prognosis is Good.     Pt will continue to benefit from skilled outpatient physical therapy to address the deficits listed in the problem list box on initial evaluation, provide pt/family education and to maximize pt's level of independence in the home and community environment.     Pt's spiritual, cultural and educational needs considered and pt agreeable to plan of care and goals.    Anticipated barriers to physical therapy: Scheduling issues    Goals:  Short Term Goals: 4 weeks  1.Report decreased in pain at worse less than  <   / =  5  /10  to increase tolerance for functional mobility. On going  2. Pt to improve B shoulders range of motion by 25% to allow for improved functional mobility to allow for improvement in IADLs. On going  3. Increased B shoulders  MMT 1/2 grade to increase tolerance for ADL and work activities. On going  4. Pt to report be conscious of impaired sitting and standing posture  daily to decrease pain. On going  5. Pt to tolerate HEP to improve ROM and independence with ADL's. On going     Long Term Goals: 8 weeks  1.Report decreased in pain at worse less than  <   / =  2  /10  to increase tolerance for functional mobility. On going  2.  Patient will demonstrate improved overall function per FOTO Survey to CI = at least 1% but < 20% impaired, limited or restricted score or less.On going  3.Increased B shoulders  MMT 1 grade to increase tolerance for ADL and work activities.On going  4. Pt to report and demonstrate proper posture in standing and sitting to decrease pain. On going  5. Pt to be Independent with HEP to improve ROM and independence with ADL's.On going  6. Pt to improve B shoulders  range of motion by 75% to allow for improved functional mobility to allow for improvement in IADLs. On going    Plan       Cont skilled PT session towards PT and patient's goals.    Oli Patel, PT   12/05/2023

## 2023-12-12 ENCOUNTER — CLINICAL SUPPORT (OUTPATIENT)
Dept: REHABILITATION | Facility: HOSPITAL | Age: 28
End: 2023-12-12
Payer: MEDICAID

## 2023-12-12 DIAGNOSIS — R53.1 DECREASED STRENGTH, ENDURANCE, AND MOBILITY: ICD-10-CM

## 2023-12-12 DIAGNOSIS — R68.89 DECREASED STRENGTH, ENDURANCE, AND MOBILITY: ICD-10-CM

## 2023-12-12 DIAGNOSIS — M25.611 DECREASED RIGHT SHOULDER RANGE OF MOTION: ICD-10-CM

## 2023-12-12 DIAGNOSIS — M25.511 CHRONIC PAIN OF BOTH SHOULDERS: Primary | ICD-10-CM

## 2023-12-12 DIAGNOSIS — M25.512 CHRONIC PAIN OF BOTH SHOULDERS: Primary | ICD-10-CM

## 2023-12-12 DIAGNOSIS — G89.29 CHRONIC PAIN OF BOTH SHOULDERS: Primary | ICD-10-CM

## 2023-12-12 DIAGNOSIS — R29.898 WEAKNESS OF SHOULDER: ICD-10-CM

## 2023-12-12 DIAGNOSIS — Z74.09 DECREASED STRENGTH, ENDURANCE, AND MOBILITY: ICD-10-CM

## 2023-12-12 DIAGNOSIS — M25.612 DECREASED RANGE OF MOTION OF LEFT SHOULDER: ICD-10-CM

## 2023-12-12 PROCEDURE — 97110 THERAPEUTIC EXERCISES: CPT | Mod: PN

## 2023-12-12 NOTE — PROGRESS NOTES
"  Physical Therapy Daily Treatment Note     Name: Michael Gonzalez  Clinic Number: [id number]    Therapy Diagnosis:   Encounter Diagnoses   Name Primary?    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     She was somewhat compliant with home exercise program.  Response to previous treatment: initial eval   Functional change: ongoing    Pain: 2/10  Location: bilateral shoulders  ( R worse than L)     Objective     Michael received therapeutic exercises to develop strength, endurance, ROM, flexibility, posture, and core stabilization for 53 minutes including:    Please Cont same exercises next visit: (12-15-23)  Pulley's flexion 3 min  Pulley's abd 3 min   HOB elevated supine shoulder flexion 1# wand  AAROM  2x10  HOB elevated supine chest press 1# wand AAROM  2x10  Supine shoulder ER AAROM 1# wand 2x10 ea  Serratus punch AAROM 1# wand 3x10   Seated table slide flexion 2x10   Seated table slide abd 2x10  UT Stretch 3x30"/ea  Lev Scap Stretch 3x30"/ea  Supine chin tuck 10 times hold 3 sec       Michael received the following manual therapy techniques: Joint mobilizations were applied to the: BUE for 00 minutes, including:  R shoulder joint kevins     Michael " received cold pack for 00 minutes to 00.      Home Exercises Provided and Patient Education Provided     Education provided:   Cont to perform HEP as provided.     Written Home Exercises Provided: Patient instructed to cont prior HEP.  Exercises were reviewed and Michael was able to demonstrate them prior to the end of the session.  Michael demonstrated good  understanding of the education provided.     See EMR under Patient Instructions for exercises provided prior visit.    Assessment     Pt tolerated the above tx session well, but with min c/o pain. Same exercises were performed from last visit, Only addition of supine chin tuck today. Pt is progressing slow in therapy. We do not want to progress fast due to increase in pain. Pt was re-assessed today. She cont with B shoulder pain and weakness. She still have deep cervical weakness. Difficult to get up from supine to sit due to deep cervical weakness.FOTO outcome demonstrated improvement since initial eval. Pt has met 1/5 STGs today. Overall, pt has been improving slowly in therapy. We will cont to progress pt slowly to avoid and provocation of pain. Will continue working towards established PT goals in future sessions.     Michael Is progressing well towards her goals.   Pt prognosis is Good.     Pt will continue to benefit from skilled outpatient physical therapy to address the deficits listed in the problem list box on initial evaluation, provide pt/family education and to maximize pt's level of independence in the home and community environment.     Pt's spiritual, cultural and educational needs considered and pt agreeable to plan of care and goals.    Anticipated barriers to physical therapy: Scheduling issues    Goals:  Short Term Goals: 4 weeks  1.Report decreased in pain at worse less than  <   / =  5  /10  to increase tolerance for functional mobility. Goal not met 12-12-23  2. Pt to improve B shoulders range of motion by 25% to allow for improved  functional mobility to allow for improvement in IADLs. On going  3. Increased B shoulders  MMT 1/2 grade to increase tolerance for ADL and work activities. On going  4. Pt to report be conscious of impaired sitting and standing posture daily to decrease pain. Goal not met 12-12-23  5. Pt to tolerate HEP to improve ROM and independence with ADL's. Goal  met 12-12-23     Long Term Goals: 8 weeks  1.Report decreased in pain at worse less than  <   / =  2  /10  to increase tolerance for functional mobility. On going  2.  Patient will demonstrate improved overall function per FOTO Survey to CI = at least 1% but < 20% impaired, limited or restricted score or less.On going  3.Increased B shoulders  MMT 1 grade to increase tolerance for ADL and work activities.On going  4. Pt to report and demonstrate proper posture in standing and sitting to decrease pain. On going  5. Pt to be Independent with HEP to improve ROM and independence with ADL's.On going  6. Pt to improve B shoulders  range of motion by 75% to allow for improved functional mobility to allow for improvement in IADLs. On going    Plan       Cont skilled PT session towards PT and patient's goals.    Oli Patel, PT   12/12/2023

## 2023-12-15 ENCOUNTER — CLINICAL SUPPORT (OUTPATIENT)
Dept: REHABILITATION | Facility: HOSPITAL | Age: 28
End: 2023-12-15
Payer: MEDICAID

## 2023-12-15 DIAGNOSIS — M25.511 CHRONIC PAIN OF BOTH SHOULDERS: Primary | ICD-10-CM

## 2023-12-15 DIAGNOSIS — M25.611 DECREASED RIGHT SHOULDER RANGE OF MOTION: ICD-10-CM

## 2023-12-15 DIAGNOSIS — Z74.09 DECREASED STRENGTH, ENDURANCE, AND MOBILITY: ICD-10-CM

## 2023-12-15 DIAGNOSIS — G89.29 CHRONIC PAIN OF BOTH SHOULDERS: Primary | ICD-10-CM

## 2023-12-15 DIAGNOSIS — R29.898 WEAKNESS OF SHOULDER: ICD-10-CM

## 2023-12-15 DIAGNOSIS — M25.512 CHRONIC PAIN OF BOTH SHOULDERS: Primary | ICD-10-CM

## 2023-12-15 DIAGNOSIS — R68.89 DECREASED STRENGTH, ENDURANCE, AND MOBILITY: ICD-10-CM

## 2023-12-15 DIAGNOSIS — M25.612 DECREASED RANGE OF MOTION OF LEFT SHOULDER: ICD-10-CM

## 2023-12-15 DIAGNOSIS — R53.1 DECREASED STRENGTH, ENDURANCE, AND MOBILITY: ICD-10-CM

## 2023-12-15 PROCEDURE — 97110 THERAPEUTIC EXERCISES: CPT | Mod: PN

## 2023-12-15 NOTE — PROGRESS NOTES
"  Physical Therapy Daily Treatment Note     Name: Michael Gonzalez  Clinic Number: [id number]    Therapy Diagnosis:   Encounter Diagnoses   Name Primary?    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 She feels soreness in the shoulder in the morning when she wakes up     She was somewhat compliant with home exercise program.  Response to previous treatment: initial eval   Functional change: ongoing    Pain: 2/10  Location: bilateral shoulders  ( R worse than L)     Objective     Michael received therapeutic exercises to develop strength, endurance, ROM, flexibility, posture, and core stabilization for 53 minutes including:    UBE 3 min forward  Pulley's flexion 3 min  Pulley's abd 3 min   +Wall slide flexion 1x10  +Shoulder extension YTB 1x10   HOB elevated supine shoulder flexion 1# wand  AAROM  2x10  HOB elevated supine chest press 1# wand AAROM  2x10  Supine shoulder ER AAROM 1# wand 2x10 ea  Seated table slide flexion 2x10   Seated table slide abd 2x10  UT Stretch 3x30"/ea  Lev Scap Stretch 3x30"/ea  Supine chin tuck 20 times hold 3 sec       Michael received the following manual therapy techniques: Joint mobilizations were applied to the: BUE for 00 " minutes, including:  R shoulder joint mobs     Michael received cold pack for 00 minutes to 00.      Home Exercises Provided and Patient Education Provided     Education provided:   Cont to perform HEP as provided.     Written Home Exercises Provided: Patient instructed to cont prior HEP.  Exercises were reviewed and Michael was able to demonstrate them prior to the end of the session.  Michael demonstrated good  understanding of the education provided.     See EMR under Patient Instructions for exercises provided prior visit.    Assessment     Pt tolerated the above tx session well, but with min c/o pain. Progression of exericses today. Addition of UBE , Shoulder extension and wall slide. Pt did not have any provocation of pain. Pt required mod v/c's to perfrom new exericses with proper form and proper muscles activation. Pt cont with deep cervical muscles weakness and decrease rotator cuff muscles strength. We will cont to progress pt slowly to avoid and provocation of pain. Will continue working towards established PT goals in future sessions.     Michael Is progressing well towards her goals.   Pt prognosis is Good.     Pt will continue to benefit from skilled outpatient physical therapy to address the deficits listed in the problem list box on initial evaluation, provide pt/family education and to maximize pt's level of independence in the home and community environment.     Pt's spiritual, cultural and educational needs considered and pt agreeable to plan of care and goals.    Anticipated barriers to physical therapy: Scheduling issues    Goals:  Short Term Goals: 4 weeks  1.Report decreased in pain at worse less than  <   / =  5  /10  to increase tolerance for functional mobility. Goal not met 12-12-23  2. Pt to improve B shoulders range of motion by 25% to allow for improved functional mobility to allow for improvement in IADLs. On going  3. Increased B shoulders  MMT 1/2 grade to increase tolerance for  ADL and work activities. On going  4. Pt to report be conscious of impaired sitting and standing posture daily to decrease pain. Goal not met 12-12-23  5. Pt to tolerate HEP to improve ROM and independence with ADL's. Goal  met 12-12-23     Long Term Goals: 8 weeks  1.Report decreased in pain at worse less than  <   / =  2  /10  to increase tolerance for functional mobility. On going  2.  Patient will demonstrate improved overall function per FOTO Survey to CI = at least 1% but < 20% impaired, limited or restricted score or less.On going  3.Increased B shoulders  MMT 1 grade to increase tolerance for ADL and work activities.On going  4. Pt to report and demonstrate proper posture in standing and sitting to decrease pain. On going  5. Pt to be Independent with HEP to improve ROM and independence with ADL's.On going  6. Pt to improve B shoulders  range of motion by 75% to allow for improved functional mobility to allow for improvement in IADLs. On going    Plan       Cont skilled PT session towards PT and patient's goals.    Oli Patel, PT   12/15/2023

## 2023-12-19 ENCOUNTER — CLINICAL SUPPORT (OUTPATIENT)
Dept: REHABILITATION | Facility: HOSPITAL | Age: 28
End: 2023-12-19
Payer: MEDICAID

## 2023-12-19 DIAGNOSIS — Z74.09 DECREASED STRENGTH, ENDURANCE, AND MOBILITY: ICD-10-CM

## 2023-12-19 DIAGNOSIS — R29.898 WEAKNESS OF SHOULDER: ICD-10-CM

## 2023-12-19 DIAGNOSIS — M25.511 CHRONIC PAIN OF BOTH SHOULDERS: Primary | ICD-10-CM

## 2023-12-19 DIAGNOSIS — M25.612 DECREASED RANGE OF MOTION OF LEFT SHOULDER: ICD-10-CM

## 2023-12-19 DIAGNOSIS — G89.29 CHRONIC PAIN OF BOTH SHOULDERS: Primary | ICD-10-CM

## 2023-12-19 DIAGNOSIS — R68.89 DECREASED STRENGTH, ENDURANCE, AND MOBILITY: ICD-10-CM

## 2023-12-19 DIAGNOSIS — R53.1 DECREASED STRENGTH, ENDURANCE, AND MOBILITY: ICD-10-CM

## 2023-12-19 DIAGNOSIS — M25.512 CHRONIC PAIN OF BOTH SHOULDERS: Primary | ICD-10-CM

## 2023-12-19 DIAGNOSIS — M25.611 DECREASED RIGHT SHOULDER RANGE OF MOTION: ICD-10-CM

## 2023-12-19 PROCEDURE — 97110 THERAPEUTIC EXERCISES: CPT | Mod: PN

## 2023-12-19 NOTE — PROGRESS NOTES
"  Physical Therapy Daily Treatment Note     Name: Micahel Gonzalez  Clinic Number: [id number]    Therapy Diagnosis:   Encounter Diagnoses   Name Primary?    Chronic pain of both shoulders Yes    Weakness of shoulder     Decreased right shoulder range of motion     Decreased range of motion of left shoulder     Decreased strength, endurance, and mobility        Physician: Daphne Vazquez MD    Visit Date: 12/19/2023    Physician Orders: PT Eval and Treat   Medical Diagnosis from Referral:   C80.1 (ICD-10-CM) - Papillary carcinoma   R29.898 (ICD-10-CM) - Shoulder weakness      Evaluation Date: 11/10/2023  Authorization Period Expiration: 12-31-23  Plan of Care Expiration: 2-10-24  Visit # / Visits authorized: 5/ 20    Progress Note Due: 1-12-23  FOTO: 1/1     Precautions: Standard     Time In: 2:30 pm  Time Out: 3:25 pm   Total Appointment Time (timed & untimed codes): 55  minutes    Subjective     Pt reports: that she does not have any pain. She is feeling better today. Pt states she did not feel increase in pain after last PT session.     She was somewhat compliant with home exercise program.  Response to previous treatment: initial eval   Functional change: ongoing    Pain: 2/10  Location: bilateral shoulders  ( R worse than L)     Objective     Michael received therapeutic exercises to develop strength, endurance, ROM, flexibility, posture, and core stabilization for 53 minutes including:    UBE 6 min forward/backward   Pulley's flexion 3 min  Pulley's abd 3 min   +Wall slide flexion 2x10  +Shoulder extension YTB 2x10   HOB elevated supine shoulder flexion 1# wand  AAROM  3x10  HOB elevated supine chest press 1# wand AAROM  3x10  Supine shoulder ER AAROM 1# wand 3x10 ea  Seated table slide flexion 3x10   Seated table slide abd 3x10  UT Stretch 3x30"/ea  Lev Scap Stretch 3x30"/ea  Supine chin tuck 30 times hold 3 sec       Michael received the following manual therapy techniques: Joint mobilizations were applied " to the: BUE for 00 minutes, including:  R shoulder joint mobs     Michael received cold pack for 00 minutes to 00.      Home Exercises Provided and Patient Education Provided     Education provided:   Cont to perform HEP as provided.     Written Home Exercises Provided: Patient instructed to cont prior HEP.  Exercises were reviewed and Michael was able to demonstrate them prior to the end of the session.  Michael demonstrated good  understanding of the education provided.     See EMR under Patient Instructions for exercises provided prior visit.    Assessment     Pt tolerated the above tx session well, but with min c/o pain. Progression of exericses today. Increase sets and reps today. Pt demonstrated increase in cervical and shoulder muscles fatigue. Pt did not have any provocation of pain. Pt required min v/c's to perfrom new exericses with proper form and proper muscles activation. Pt cont with deep cervical muscles weakness and decrease rotator cuff muscles strength. We will cont to progress pt slowly to avoid and provocation of pain. Will continue working towards established PT goals in future sessions.     Michael Is progressing well towards her goals.   Pt prognosis is Good.     Pt will continue to benefit from skilled outpatient physical therapy to address the deficits listed in the problem list box on initial evaluation, provide pt/family education and to maximize pt's level of independence in the home and community environment.     Pt's spiritual, cultural and educational needs considered and pt agreeable to plan of care and goals.    Anticipated barriers to physical therapy: Scheduling issues    Goals:  Short Term Goals: 4 weeks  1.Report decreased in pain at worse less than  <   / =  5  /10  to increase tolerance for functional mobility. Goal not met 12-12-23  2. Pt to improve B shoulders range of motion by 25% to allow for improved functional mobility to allow for improvement in IADLs. On going  3.  Increased B shoulders  MMT 1/2 grade to increase tolerance for ADL and work activities. On going  4. Pt to report be conscious of impaired sitting and standing posture daily to decrease pain. Goal not met 12-12-23  5. Pt to tolerate HEP to improve ROM and independence with ADL's. Goal  met 12-12-23     Long Term Goals: 8 weeks  1.Report decreased in pain at worse less than  <   / =  2  /10  to increase tolerance for functional mobility. On going  2.  Patient will demonstrate improved overall function per FOTO Survey to CI = at least 1% but < 20% impaired, limited or restricted score or less.On going  3.Increased B shoulders  MMT 1 grade to increase tolerance for ADL and work activities.On going  4. Pt to report and demonstrate proper posture in standing and sitting to decrease pain. On going  5. Pt to be Independent with HEP to improve ROM and independence with ADL's.On going  6. Pt to improve B shoulders  range of motion by 75% to allow for improved functional mobility to allow for improvement in IADLs. On going    Plan       Cont skilled PT session towards PT and patient's goals.    Oli Patel, PT   12/19/2023

## 2023-12-21 ENCOUNTER — TELEPHONE (OUTPATIENT)
Dept: OTOLARYNGOLOGY | Facility: CLINIC | Age: 28
End: 2023-12-21
Payer: MEDICAID

## 2023-12-21 NOTE — TELEPHONE ENCOUNTER
----- Message from Chinyere Brown sent at 12/21/2023  8:44 AM CST -----  Type: Patient Call Back    Who called: self     What is the request in detail: patient would like to speak with nurse concerning her stitches. Please call    Can the clinic reply by [facility]? no    Would the patient rather a call back or a response via [facility]?  call    Best call back number: [phone]    Additional Information:

## 2023-12-21 NOTE — TELEPHONE ENCOUNTER
Spoke with pt, states has a few stitches coming out of her incision site, informed her they are dissolvable but on occasion they dont dissolve and can be pulled out, informed her if she feels comfortable she can get some tweezers and try if not she could come by office and we can take a look at it,  she said she just wanted to make sure that this does happen and she may try to get them out, if not she will come by after jenna for us to look at them.

## 2024-01-19 ENCOUNTER — TELEPHONE (OUTPATIENT)
Dept: SPEECH THERAPY | Facility: HOSPITAL | Age: 29
End: 2024-01-19
Payer: MEDICAID

## 2024-01-23 ENCOUNTER — TELEPHONE (OUTPATIENT)
Dept: SPEECH THERAPY | Facility: HOSPITAL | Age: 29
End: 2024-01-23
Payer: MEDICAID

## 2024-01-23 NOTE — TELEPHONE ENCOUNTER
Audra pt to reschedule lymph eval 2/2@9am.----- Message from Sravani Alcantara sent at 1/23/2024  2:14 PM CST -----  Contact: [phone]  RESCHEDULE  Pt is calling to reschedule her appt that she has scheduled for 01/31. No appt in  [facility] call pt at [phone]

## 2024-01-31 ENCOUNTER — OFFICE VISIT (OUTPATIENT)
Dept: OTOLARYNGOLOGY | Facility: CLINIC | Age: 29
End: 2024-01-31
Payer: MEDICAID

## 2024-01-31 ENCOUNTER — CLINICAL SUPPORT (OUTPATIENT)
Dept: AUDIOLOGY | Facility: CLINIC | Age: 29
End: 2024-01-31
Payer: MEDICAID

## 2024-01-31 DIAGNOSIS — H93.291 ABNORMAL AUDITORY PERCEPTION OF RIGHT EAR: Primary | ICD-10-CM

## 2024-01-31 DIAGNOSIS — E89.0 S/P COMPLETE THYROIDECTOMY: ICD-10-CM

## 2024-01-31 DIAGNOSIS — C80.1 PAPILLARY CARCINOMA: Primary | ICD-10-CM

## 2024-01-31 PROCEDURE — 99024 POSTOP FOLLOW-UP VISIT: CPT | Mod: S$GLB,,, | Performed by: OTOLARYNGOLOGY

## 2024-01-31 PROCEDURE — 92550 TYMPANOMETRY & REFLEX THRESH: CPT | Mod: S$GLB,,,

## 2024-01-31 PROCEDURE — 92557 COMPREHENSIVE HEARING TEST: CPT | Mod: S$GLB,,,

## 2024-01-31 NOTE — PROGRESS NOTES
Please click on link to view Audiogram:  Document on 1/31/2024 9:16 AM by Janette Combs AU.D: Audiogram    Michael Gonzalez, a 28 y.o. female, was seen in the clinic today for an audiological evaluation. Patient's main complaint was muffled hearing in her right ear.    Otoscopy clear bilaterally. Tympanometry testing revealed a Type A tympanogram for the right ear and a Type A tympanogram for the left ear. Ipsilateral acoustic reflexes were present at 500-2000 Hz for the right ear and were present at all tested frequencies for the left ear.    Audiological testing revealed essentially normal hearing with a mild hearing loss at 6000 Hz for the right ear and normal hearing sensitivity for the left ear. A speech reception threshold was obtained at 5 dBHL for the right ear and at 5 dBHL for the left ear. Speech discrimination was 100% for the right ear and 100% for the left ear.      Today's results were discussed with the patient. Patient expressed understanding of hearing test results and recommendations.    Recommendations:  1. Otologic evaluation  2. Repeat audiological evaluation or sooner if hearing changes  3. Hearing protection when in noise

## 2024-01-31 NOTE — PATIENT INSTRUCTIONS
Information and instructions from your visit with me today:  Always use saline every time before a medication spray. You can also use saline on its own. If you are using saline and/or the medication sprays on an as needed basis and you have symptoms use the regimen daily for at least 2 weeks. You can use the flonase and astelin together, or if you prefer to start with just one medication spray, the flonase works better by itself compared to astelin by itself. You can try doing the saline and flonase and if still congested, add on the astelin again doing this regimen daily for up to two weeks when congestion. There may be times of the year that you only need saline and there may be times of the year that you need saline, flonase and astelin to control symptoms.   Start using the following medication nasal sprays:   Fluticasone spray:    This medication is a steroid spray. It stays within the nose and does not have absorption into the body that leads to side effects that one has with oral steroid medication. Fluticasone nasal spray is the same as the Flonase brand nasal spray. Discuss with your pharmacist if the price is lower over the counter or with a prescription ( this varies depending on insurance). The medication that is over the counter is the same as the prescription medication. Use this medication as instructed on the prescription, 1-2 sprays on each side of your nose twice daily.     Azelastine  spray:  This medication is an antihistamine used to treat nasal symptoms of allergy, which works specifically in the nose unlike antihistamine pills which have more of an effect on the whole body. Use this medication as instructed on the prescription, 1 spray on each side of your nose twice daily.     Additional instructions for medication sprays  Place the tip of the medication bottle in your nose and aim slightly up and out on each side to get medication high and deep into your nose and sinuses, and not have it all  "deposit in the very front of your nose. Aim the tip of the nozzle towards the outer corner of your eye . You can imagine aiming towards the back of your eyeball on each side for this, as opposed to straight back to the center of your nose and head.     You need to use this medication every day regardless of symptoms, as it takes time ( a few weeks) to work and get the benefits. It does not work on an "as needed" basis like taking a decongestant. If your symptoms only occur in a particular season, then the medication can be used seasonally instead of year long. For seasonal symptoms, you should start using the spray twice daily a month before when you normally have symptoms ( for example, if symptoms start in August, should start at the end of June).     Start nasal irrigations with saline solution- you can either use a rinse or a mist spray:    NASAL SALINE SPRAY ( simply saline and arm and hammer are examples) There are several different brands found in the cold and flu aisle of the pharmacy. You can use any brand of saline spray - this will deliver the saline by a gentle mist ( if you have difficulty or discomfort with nasal rinse/ a lot of fluid in the nose, this will be more comfortable).       Always rinse your nose with saline prior to using medication sprays and wait a couple of hours before using again. You can use the saline throughout the day to help with stuffy nose or dry nose.    Do not use nasal decongestant sprays such as Afrin or similar products long term ( over 3 days) .  This can cause long term physical nasal addiction. Afrin should only be used if having nose bleeds, severe nasal congestion , or severe ear pain/fullness and should not be used for more than 2-3 days in a row . It is a not a medication that should be used for a long period of time.     It was nice meeting you today, and I look forward to helping you feel better soon. Please don't hesitate to call if you have any other questions or " concerns, or if I can be of any assistance in the meantime.      Daphne Vazquez MD    [facility]     Phone  [phone]    Fax      [phone]        Daphne Vazquez MD  Otorhinolaryngology

## 2024-01-31 NOTE — PROGRESS NOTES
OTOLARYNGOLOGY CLINIC NOTE  Date:  01/31/2024     Chief complaint:  Follow upafter neck dissection and thyroidectomy       History of Present Illness  Michael Gonzalez is a 28 y.o. female  presenting today for a followup.    Neck is doing better   Left ear not numb ; feels hearing is muffled on left ear  Still feels sensitive in the collarbone like a bruised sort of sore senseation   Going to see nain to help with bubbles under skin and swelling  Shoulders are feeling much better    Finished radioactive iodine. Interested in changing to [facility]  Past Medical History  Past Medical History:   Diagnosis Date    Allergy     Anxiety     Gallstones     PCOS (polycystic ovarian syndrome)         Past Surgical History  Past Surgical History:   Procedure Laterality Date    DISSECTION OF NECK Bilateral 10/31/2023    Procedure: DISSECTION, NECK;  Surgeon: Daphne Vazquez MD;  Location: [facility] OR;  Service: ENT;  Laterality: Bilateral;  possible bilateral    INCISION AND DRAINAGE, NECK Left 11/13/2023    Procedure: INCISION AND DRAINAGE NECK, REPAIR THORACIC DUCT,WITH HARRIETT DRAIN;  Surgeon: Daphne Vazquez MD;  Location: [facility] OR;  Service: ENT;  Laterality: Left;  with washout, placement of drain and possible repair of thoracic duct.  (This is a thyroid card please make changes so a card can be built.)    THYROIDECTOMY, BILATERAL Bilateral 10/31/2023    Procedure: THYROIDECTOMY,BILATERAL;  Surgeon: Daphne Vazquez MD;  Location: [facility] OR;  Service: ENT;  Laterality: Bilateral;  NEUROMONITORING MIREILLE HAIDER [phone]  RN PREOP 10/26/2023        Medications  Current Outpatient Medications on File Prior to Visit   Medication Sig Dispense Refill    azelastine (ASTELIN) 137 mcg (0.1 %) nasal spray 1 spray (137 mcg total) by Nasal route 2 (two) times daily. 30 mL 3    cyclobenzaprine (FLEXERIL) 5 MG tablet Take 1 tablet (5 mg total) by mouth 3 (three) times daily as needed for Muscle spasms. 30 tablet 0     drospirenone-ethinyl estradioL (YOLY) 3-0.02 mg per tablet Take 1 tablet by mouth once daily.      fluticasone propionate (FLONASE) 50 mcg/actuation nasal spray 2 sprays (100 mcg total) by Each Nostril route 2 (two) times daily. 18.2 mL 3    levothyroxine (SYNTHROID) 125 MCG tablet Take 1 tablet (125 mcg total) by mouth before breakfast. 30 tablet 11    octreotide (SANDOSTATIN) 100 mcg/mL Soln Inject 1 mL (100 mcg total) into the skin every 8 (eight) hours for 10 days in a row 90 mL 11    promethazine (PHENERGAN) 6.25 mg/5 mL syrup Take 20 mLs (25 mg total) by mouth every 6 (six) hours as needed (cough). 473 mL 0     No current facility-administered medications on file prior to visit.       Review of Systems  Review of Systems   Constitutional: Negative.    HENT: Negative.     Eyes: Negative.    Respiratory: Negative.     Cardiovascular: Negative.    Gastrointestinal: Negative.    Genitourinary: Negative.    Musculoskeletal: Negative.    Skin: Negative.    Neurological: Negative.    Psychiatric/Behavioral: Negative.          Social History   reports that she has quit smoking. Her smoking use included cigarettes. She has never used smokeless tobacco. She reports that she does not drink alcohol and does not use drugs.     Family History  Family History   Problem Relation Age of Onset    No Known Problems Mother     Diabetes Father     Hypertension Father         Physical Exam   There were no vitals filed for this visit. There is no height or weight on file to calculate BMI.            GENERAL: no acute distress.  HEAD: normocephalic.   EYES: No scleral icterus  EARS: external ear without lesion, normal pinna shape and position.  External auditory canal with normal cerumen, tympanic membrane fully visible, no perforation , no retraction. No middle ear effusion. Ossicles intact.   NOSE: external nose without significant bony abnormality  ORAL CAVITY/OROPHARYNX: tongue mobile.   NECK: trachea midline. Incisions healing  well .   LYMPH NODES:No cervical lymphadenopathy.  RESPIRATORY: no stridor, no stertor. Voice normal. Respirations nonlabored.  NEURO: alert, responds to questions appropriately.    PSYCH:mood appropriate    AUDIOLOGY RESULTS  Audiometric evaluation including audiogram, tympanometry, acoustic reflexes, and speech discrimination which was performed 1-31-24  was personally reviewed and interpreted.  Notable findings on the audiogram were normal hearing with no evidence of sensorineural hearing loss (SNHL).  Tympanometry revealed Type A tympanogram on the left and Type A tympanogram on the right. Speech discrimination was 100%  on the left, and 100% on the right.     Report of the audiologist performing this audiometric testing was also reviewed   Imaging:  The patient does not have any new imaging of the head and neck since last visit.     Labs:  CBC  Recent Labs   Lab 10/26/23  1440 11/11/23  1340 11/12/23  0428   WBC 8.01 6.84 5.67   Hemoglobin 11.0 L 11.9 L 11.3 L   Hematocrit 33.6 L 36.7 L 34.4 L   MCV 88 89 88   Platelets 287 376 383     BMP  Recent Labs   Lab 11/12/23  0428 11/13/23  0452 11/14/23  0511   Glucose 103 106 95   Sodium 139 139 141   Potassium 3.8 4.1 4.6   Chloride 105 107 106   CO2 22 L 25 21 L   BUN 15 14 12   Creatinine 0.7 0.7 0.7   Calcium 9.1 9.0 8.9     COAGS  Recent Labs   Lab 11/11/23  1340   INR 1.0     Thyroglobulin less than 0.1   Tsh 0.768  Tg ab less than 0.9  Assessment  1. Papillary carcinoma  - Ambulatory referral/consult to Endocrinology; Future    2. S/P complete thyroidectomy  - Ambulatory referral/consult to Endocrinology; Future       Plan:  Discussed plan of care with patient in detail and all questions answered. Patient reported understanding of plan of care. I gave the patient the opportunity to ask questions and patient confirmed all questions answered to satisfaction.     S/p total thyroid, bilateral neck and central neck dissection 10-31-23 complicated by chlye leak  which was repaired 11-13-23 . Has been doing well since last visit , completed radioactive iodine  Wanting an endocrine doctor inside of [facility] referral placed   Reviewed hearing results suspect may be numbness causing muffled sensation ; has been improving along with numbness  No recurrent disease  Doing well overall with postoperative course    F/u 4 months sooner if issue    I spent a total of 20 minutes on the day of the visit.  This includes face to face time and non-face to face time preparing to see the patient (eg, review of tests), obtaining and/or reviewing separately obtained history, documenting clinical information in the electronic or other health record, independently interpreting results and communicating results to the patient/family/caregiver, or care coordinator.   Please be aware that this note has been generated with the assistance of MModal voice-to-text.  Please excuse any spelling or grammatical errors.

## 2024-02-02 ENCOUNTER — CLINICAL SUPPORT (OUTPATIENT)
Dept: SPEECH THERAPY | Facility: HOSPITAL | Age: 29
End: 2024-02-02
Payer: MEDICAID

## 2024-02-02 DIAGNOSIS — Z92.3 HISTORY OF RADIOACTIVE IODINE THYROID ABLATION: ICD-10-CM

## 2024-02-02 DIAGNOSIS — I89.0 LYMPHEDEMA: Primary | ICD-10-CM

## 2024-02-02 DIAGNOSIS — C73 PAPILLARY THYROID CARCINOMA: ICD-10-CM

## 2024-02-02 PROCEDURE — 92610 EVALUATE SWALLOWING FUNCTION: CPT | Mod: GN | Performed by: SPEECH-LANGUAGE PATHOLOGIST

## 2024-02-02 NOTE — PROGRESS NOTES
REFERRING PHYSICIAN:  Daphne Vazquez M.D., Otorhinolaryngologist    REASON FOR REFERRAL:  Lymphedema s/p thyroidectomy and BND for papillary thyroid cancer and subsequent repair of L chyle leak.  She completed Acosta 1/18/24.    Mrs. Michael Gonzalez, age 28, was referred by Dr. Daphne Vazquez for lymphedema evaluation and treatment with the above history.  Mrs. Gonzalez reported that the swelling associated with the chyle leak has resolved, but she continues to have swelling primarily in the submental area and lowest aspect of face.  She noted that this has been less changeable over the course of the day since ACOSTA than it was before.    MEDICAL HISTORY:  Past Medical History:   Diagnosis Date    Allergy     Anxiety     Gallstones     PCOS (polycystic ovarian syndrome)        History of trauma (vs surgery, RT, CRT, or chemo):   Negative     History of other possible etiologies for edema:  none.    SURGICAL HISTORY:  Past Surgical History:   Procedure Laterality Date    DISSECTION OF NECK Bilateral 10/31/2023    Procedure: DISSECTION, NECK;  Surgeon: Daphne Vazquez MD;  Location: [facility] OR;  Service: ENT;  Laterality: Bilateral;  possible bilateral    INCISION AND DRAINAGE, NECK Left 11/13/2023    Procedure: INCISION AND DRAINAGE NECK, REPAIR THORACIC DUCT,WITH HARRIETT DRAIN;  Surgeon: Daphne Vazquez MD;  Location: [facility] OR;  Service: ENT;  Laterality: Left;  with washout, placement of drain and possible repair of thoracic duct.  (This is a thyroid card please make changes so a card can be built.)    THYROIDECTOMY, BILATERAL Bilateral 10/31/2023    Procedure: THYROIDECTOMY,BILATERAL;  Surgeon: Daphne Vazquez MD;  Location: [facility] OR;  Service: ENT;  Laterality: Bilateral;  NEUROMONITORING MIREILLE HAIDER [phone]  RN PREOP 10/26/2023       ONCOLOGIC and TREATMENT HISTORY of problem for which patient is referred:  FNA, radiology-guided, 10/18/23.  Thyroidectomy, BND 10/31/23.  Repair thoracic duct (chyle leak)  23.  ACOSTA, 24.      SWALLOWING HISTORY:  Had swallowing problems immediately after thyroidectomy (coughing, choking with liquids and foods), but this has resolved.  Back to her baseline regular consistency diet with thin liquids; takes pills with liquids.    LYMPHEDEMA HISTORY:   Onset:  post-op  Stable  Functional deficits affecting:  voice (difficulty projecting at time), ROM (especially head down and to L), and respiration    Pattern of swelling   a) over time:  Reduced as compared to that present with chyle leak, but stable since  b) over a given day: stable, particularly since ACOSTA.  Prior to that, noted it tended to be larger on waking, then reduce over the course of the day.    FAMILY HISTORY:  Family History   Problem Relation Age of Onset    No Known Problems Mother     Diabetes Father     Hypertension Father        SOCIAL HISTORY:  Mrs. Gonzalez is  and lives in [place].  She had three children, ages 9, 5, and 3.  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      Caregivers available to support/help perform MLD and/or compression/wrap?  Yes    Tobacco use:  Former smoker per EMR.    ETOH use:  No per EMR.    BEHAVIOR:  Mrs. Gonzalez was a very pleasant woman whose affect and social interaction were appropriate for situation and setting. She was fully cooperative for the assessment.  Results indicative of her current levels of functioning.    HEARIN24 audio:  Otoscopy clear bilaterally. Tympanometry testing revealed a Type A tympanogram for the right ear and a Type A tympanogram for the left ear. Ipsilateral acoustic reflexes were present at 500-2000 Hz for the right ear and were present at all tested frequencies for the left ear.     Audiological testing revealed essentially normal hearing with a mild hearing loss at 6000 Hz for the right ear and normal hearing sensitivity for the left ear. A speech  "reception threshold was obtained at 5 dBHL for the right ear and at 5 dBHL for the left ear. Speech discrimination was 100% for the right ear and 100% for the left ear.      ASSESSMENT:  Lymphedema:   General appearance of face/neck:    Asymmetry:  Negative      Wounds: Negative     Irritation:  Negative    Photographs:      In the office:  portrait, R. Profile, L. Profile, and under chin      From patient:  supplied photo     Location of edema:  Site of H&N Edema ("+" = present; blank space = absent)    + Face   unilateral   bilateral      lower lip   upper lip      nose   mid-face                         cheek   R     L                       + jowl + R   + L               upper eyelid   R     L                lower eyelid   R     L      forehead    + Submental       R     L   + bilateral     none        + Neck       R     L   + bilateral     none          Intraoral       R     L     bilateral     none     cheek     tongue     palate  (  1°   2 °)      floor of mouth      ? Supraclavicular fossa -- possible on L        Unchanged from initial         Scarring present:  Positive      Type:  minimal.  Using scar sheets to minimize.  Has been itching with healing, so also applying Cerave for this.         Staging:  MDACC Stage 1b/Foldi Stage 1 (Pitting edema that is quickly reversible. No fibrosis or tissue changes. Improves during day and worsens at night.)     Presence of irregularities not explained by lymphedema:  No       Pain:  Negative      Measurements:  Facial Measurements (mm) Right Left   1.  Tragus to mental protuberance 14 14   2.  Tragus to mouth angle 11 11   7.  Mandibular angle to mental protuberance 11 12   3.  Mandibular angle to nasal wing 11 12.25   4.  Mandibular angle to internal eye corner 13.5 14   5.  Mandibular angle to external eye corner 10.75 12   6.  Mental protuberance to internal eye corner 10.5 10   Total 81.75 85.25   Lower lip protrusion     Lower lip circumference     Upper lip " "circumference         Other Measurements  (mm)    Diagonal circumference  (around chin to crown of head)  66   Submental circumference  (1 in front of tragus to largest submental prominence) 65       Alternative mandibular angle     Mandibular angle to mandibular angle     Tragus angle to tragus angle         Horizontal neck circumference    Superior (just below mandible) 41   Middle (midway between top & bottom) 36   Inferior (lowest circumferential location) 37.25                Oral Peripheral:   Subjectively, within normal limits.  Lower lip weakness has resolved.  Lymphedema effects:  none    Speech:  100% intelligible with no distortions    Lymphedema effects:  none    Swallowing:  Resolved as above.   Lymphedema effects:  none     Voice/Resonance:  Resonance within normal limits.  Feels she cannot project at times.  Voice quality otherwise within normal limits.    Lymphedema effects:  possible    Other areas impacted?:  ROM:   Positive; feels limited when putting head down or leaning/turning to L.     Respiration:   Positive; sometimes feels breathing is restricted   Vision:  Negative   Hearing:  Negative     COUNSELING:  The above findings were reviewed with Michael Gonzalez along with a suggested course for treatment:  complete decongestive therapy with posterior approach MLD.  Advised that a typical course of treatment is 8-12 weeks using a daily home program and weekly therapy visits. She was amenable to this POC.    THERAPEUTIC INTERVENTION:  Lymphedema Therapy:  Compression:   Pre-MLD compression to the edematous area(s) was provided for 10 minutes (30 minutes in home program) and during trunk decongestion via Bryant pack with bandaging to soften firm edema and prepare skin for MLD.       Manual Lymph Drainage (MLD):  MLD was performed via the following approach(es):  posterior.  Subjectively, softer, "tighter," and felt some relief from restrictedness.    Compression:   Post-MLD compression to the " edematous area(s) was provided via flattening pad with bandaging to prevent refilling of tissues and promote continued drainage via open pathways.  The patient was instructed to wear this 2-3 hours minimum after MLD provided here or in the home program and to sleep in it if tolerated.    Will monitor for need for compression garment instead of bandaging.    Following today's intervention, the following functional changes were noted or reported:  Voice:  Unchanged      ROM:  Improved      Respiration:  Improved           IMPRESSIONS:  This 28 y.o. woman appears to present with  Head and neck lymphedema, MDACC Stage 1b/Foldi Stage 1, with associated impairments in voice (difficulty projecting), ROM, and respiration   Speech and swallowing function currently within normal limits.  S/p ACOSTA 1/18/24.  S/p thyroidectomy and BND for papillary thyroid cancer and subsequent repair of L chyle leak    RECOMMENDATIONS/PLAN OF CARE:  It if felt that Michael Gonzalez would benefit from   ST on a once weekly basis to  address voice, ROM, and respiration deficits via lymphedema treatment for 8-12 weeks with a home program using Complete Decongestive Therapy with a posterior manual lymph drainage approach.  Continued f/u with Dr. Vazquez as directed.    Long-term goals:  Mrs. Gonzalez will have   Improved vocal function, ROM, and ease of respiration.  Reduced edema by 2% or more per measurements and/or clinician and/or patient judgment.    Short-term objectives:  Mrs. Gonzalez will perform the following with % accuracy/consistency:  A.  Demonstrate ability to don/doff compression pads/bandaging with % accuracy.  B.  Demonstrate MLD technique with % accuracy.  C.  Demonstrate correct MLD sequence with % accuracy.  D.  Perform home program 1x/day by report.

## 2024-02-05 NOTE — PLAN OF CARE
IMPRESSIONS:  This 28 y.o. woman appears to present with  Head and neck lymphedema, MDACC Stage 1b/Foldi Stage 1, with associated impairments in voice (difficulty projecting), ROM, and respiration   Speech and swallowing function currently within normal limits.  S/p ACOSTA 1/18/24.  S/p thyroidectomy and BND for papillary thyroid cancer and subsequent repair of L chyle leak    RECOMMENDATIONS/PLAN OF CARE:  It if felt that Michael Gonzalez would benefit from   ST on a once weekly basis to  address voice, ROM, and respiration deficits via lymphedema treatment for 8-12 weeks with a home program using Complete Decongestive Therapy with a posterior manual lymph drainage approach.  Continued f/u with Dr. Vazquez as directed.    Long-term goals:  Mrs. Gonzalez will have   Improved vocal function, ROM, and ease of respiration.  Reduced edema by 2% or more per measurements and/or clinician and/or patient judgment.    Short-term objectives:  Mrs. Gonzalez will perform the following with % accuracy/consistency:  A.  Demonstrate ability to don/doff compression pads/bandaging with % accuracy.  B.  Demonstrate MLD technique with % accuracy.  C.  Demonstrate correct MLD sequence with % accuracy.  D.  Perform home program 1x/day by report.

## 2024-02-06 ENCOUNTER — TELEPHONE (OUTPATIENT)
Dept: SPEECH THERAPY | Facility: HOSPITAL | Age: 29
End: 2024-02-06
Payer: MEDICAID

## 2024-05-31 ENCOUNTER — OFFICE VISIT (OUTPATIENT)
Dept: OTOLARYNGOLOGY | Facility: CLINIC | Age: 29
End: 2024-05-31
Payer: MEDICAID

## 2024-05-31 VITALS
DIASTOLIC BLOOD PRESSURE: 64 MMHG | SYSTOLIC BLOOD PRESSURE: 114 MMHG | WEIGHT: 175.94 LBS | BODY MASS INDEX: 29.31 KG/M2 | HEIGHT: 65 IN

## 2024-05-31 DIAGNOSIS — E89.0 S/P COMPLETE THYROIDECTOMY: ICD-10-CM

## 2024-05-31 DIAGNOSIS — H93.291 ABNORMAL AUDITORY PERCEPTION OF RIGHT EAR: ICD-10-CM

## 2024-05-31 DIAGNOSIS — C80.1 PAPILLARY CARCINOMA: Primary | ICD-10-CM

## 2024-05-31 DIAGNOSIS — H93.8X1 SENSATION OF FULLNESS IN RIGHT EAR: ICD-10-CM

## 2024-05-31 PROCEDURE — 99213 OFFICE O/P EST LOW 20 MIN: CPT | Mod: S$GLB,,, | Performed by: OTOLARYNGOLOGY

## 2024-05-31 PROCEDURE — 1159F MED LIST DOCD IN RCRD: CPT | Mod: CPTII,S$GLB,, | Performed by: OTOLARYNGOLOGY

## 2024-05-31 PROCEDURE — 3078F DIAST BP <80 MM HG: CPT | Mod: CPTII,S$GLB,, | Performed by: OTOLARYNGOLOGY

## 2024-05-31 PROCEDURE — 3008F BODY MASS INDEX DOCD: CPT | Mod: CPTII,S$GLB,, | Performed by: OTOLARYNGOLOGY

## 2024-05-31 PROCEDURE — 3074F SYST BP LT 130 MM HG: CPT | Mod: CPTII,S$GLB,, | Performed by: OTOLARYNGOLOGY

## 2024-05-31 NOTE — PROGRESS NOTES
OTOLARYNGOLOGY CLINIC NOTE  Date:  05/31/2024     Chief complaint:  Chief Complaint   Patient presents with    Papillary carcinoma     S/P of thyroidectomy follow up for hx of Papillary carcinoma. Still having trouble with hearing out of right ear       History of Present Illness  Michael Gonzalez is a 28 y.o. female  presenting today for a followup.    Feels like a cotton ball in the rigth ear since surgery. Had hearing test in January . No change since then. Still with numbness on right side of neck and lower ear- has gotten slightly better     Has been seeing endocrine at [facility]- notes in chart reviewed  Is getting surveillence ultrasound soon - ordered by her endocrine md at [facility]    Has not noticed any change with muffled sensation in the ear while on nasal sprays  No issues with voice or swallowing    She has a history of papillary thyroid cancer s/p total thyroidectomy with bilateral 2-5 and central neck dissection 10-31-24 ( 0/46 nodes on left , 1/44 nodes on right,6/20 central nodes positive; 4.2 cm margins negative + lymphovasc invasion without extrathyroid extension) complicated by postoperative chyle leak requiring surgical repair . She completed postoperative ACOSTA     Past Medical History  Past Medical History:   Diagnosis Date    Allergy     Anxiety     Gallstones     PCOS (polycystic ovarian syndrome)         Past Surgical History  Past Surgical History:   Procedure Laterality Date    DISSECTION OF NECK Bilateral 10/31/2023    Procedure: DISSECTION, NECK;  Surgeon: Daphne Vazquez MD;  Location: [facility] OR;  Service: ENT;  Laterality: Bilateral;  possible bilateral    INCISION AND DRAINAGE, NECK Left 11/13/2023    Procedure: INCISION AND DRAINAGE NECK, REPAIR THORACIC DUCT,WITH HARRIETT DRAIN;  Surgeon: Daphne Vazquez MD;  Location: [facility] OR;  Service: ENT;  Laterality: Left;  with washout, placement of drain and possible repair of thoracic duct.  (This is a thyroid card please make changes so a  card can be built.)    THYROIDECTOMY, BILATERAL Bilateral 10/31/2023    Procedure: THYROIDECTOMY,BILATERAL;  Surgeon: Daphne Vazquez MD;  Location: [facility];  Service: ENT;  Laterality: Bilateral;  NEUROMONITORING MIREILLE HAIDER [phone]  RN PREOP 10/26/2023        Medications  Current Outpatient Medications on File Prior to Visit   Medication Sig Dispense Refill    azelastine (ASTELIN) 137 mcg (0.1 %) nasal spray 1 spray (137 mcg total) by Nasal route 2 (two) times daily. 30 mL 3    cyclobenzaprine (FLEXERIL) 5 MG tablet Take 1 tablet (5 mg total) by mouth 3 (three) times daily as needed for Muscle spasms. 30 tablet 0    drospirenone-ethinyl estradioL (YOLY) 3-0.02 mg per tablet Take 1 tablet by mouth once daily.      fluticasone propionate (FLONASE) 50 mcg/actuation nasal spray 2 sprays (100 mcg total) by Each Nostril route 2 (two) times daily. 18.2 mL 3    levothyroxine (SYNTHROID) 125 MCG tablet Take 1 tablet (125 mcg total) by mouth before breakfast. 30 tablet 11    octreotide (SANDOSTATIN) 100 mcg/mL Soln Inject 1 mL (100 mcg total) into the skin every 8 (eight) hours for 10 days in a row 90 mL 11    promethazine (PHENERGAN) 6.25 mg/5 mL syrup Take 20 mLs (25 mg total) by mouth every 6 (six) hours as needed (cough). 473 mL 0     No current facility-administered medications on file prior to visit.       Review of Systems  Review of Systems   Constitutional: Negative.    HENT: Negative.     Eyes: Negative.    Respiratory: Negative.     Cardiovascular: Negative.    Gastrointestinal: Negative.    Genitourinary: Negative.    Musculoskeletal: Negative.    Skin: Negative.    Neurological: Negative.    Psychiatric/Behavioral: Negative.          Social History   reports that she has quit smoking. Her smoking use included cigarettes. She has never used smokeless tobacco. She reports that she does not drink alcohol and does not use drugs.     Family History  Family History   Problem Relation Name Age of Onset     "No Known Problems Mother      Diabetes Father      Hypertension Father          Physical Exam   Vitals:    05/31/24 0823   BP: 114/64    Body mass index is 29.28 kg/m².  Weight: 79.8 kg (175 lb 14.8 oz)   Height: 5' 5" (165.1 cm)     GENERAL: no acute distress.  HEAD: normocephalic.   EYES: No scleral icterus  EARS: external ear without lesion, normal pinna shape and position.  External auditory canal with normal cerumen, tympanic membrane fully visible, no perforation , no retraction. No middle ear effusion. Ossicles intact.   NOSE: external nose without significant bony abnormality  ORAL CAVITY/OROPHARYNX: tongue mobile.   NECK: trachea midline. Well healed apron incision scar  LYMPH NODES:No cervical lymphadenopathy. No mass palpated  RESPIRATORY: no stridor, no stertor. Voice normal. Respirations nonlabored.  NEURO: alert, responds to questions appropriately.    PSYCH:mood appropriate      Imaging:  The patient does not have any new imaging of the head and neck since last visit.     Labs:  CBC  Recent Labs   Lab 10/26/23  1440 11/11/23  1340 11/12/23  0428   WBC 8.01 6.84 5.67   Hemoglobin 11.0 L 11.9 L 11.3 L   Hematocrit 33.6 L 36.7 L 34.4 L   MCV 88 89 88   Platelets 287 376 383     BMP  Recent Labs   Lab 11/12/23  0428 11/13/23  0452 11/14/23  0511   Glucose 103 106 95   Sodium 139 139 141   Potassium 3.8 4.1 4.6   Chloride 105 107 106   CO2 22 L 25 21 L   BUN 15 14 12   Creatinine 0.7 0.7 0.7   Calcium 9.1 9.0 8.9     COAGS  Recent Labs   Lab 11/11/23  1340   INR 1.0       Assessment  1. Papillary carcinoma    2. S/P complete thyroidectomy    3. Sensation of fullness in right ear    4. Abnormal auditory perception of right ear       Plan:  Discussed plan of care with patient in detail and all questions answered. Patient reported understanding of plan of care. I gave the patient the opportunity to ask questions and patient confirmed all questions answered to satisfaction.     Advised her to notify me when " ultrasound done- I do not see an order but I think it may not show up in epic from [facility] until the test is complete but I asked her to message me so that I can check it and see if I can review the images as well. If not I would like her to get the images on a disc to review. Will defer to endocrine team timing of surveillence ultrasounds      I think muffled sensation is from numbness from great auricular nerve- may have some normal sensation return but will be what it will be at a year and possible will not get better. Ear exam today is normal ; previous hearing testing unremarkable. Will repeat in January or sooner if any worsening or new ear symptoms  F/u 4-5 months sooner if issue    I spent a total of 20 minutes on the day of the visit.  This includes face to face time and non-face to face time preparing to see the patient (eg, review of tests), obtaining and/or reviewing separately obtained history, documenting clinical information in the electronic or other health record, independently interpreting results and communicating results to the patient/family/caregiver, or care coordinator.   Please be aware that this note has been generated with the assistance of Angela voice-to-text.  Please excuse any spelling or grammatical errors.

## 2024-06-03 ENCOUNTER — OFFICE VISIT (OUTPATIENT)
Dept: URGENT CARE | Facility: CLINIC | Age: 29
End: 2024-06-03
Payer: MEDICAID

## 2024-06-03 VITALS
BODY MASS INDEX: 29.31 KG/M2 | HEIGHT: 65 IN | OXYGEN SATURATION: 98 % | SYSTOLIC BLOOD PRESSURE: 120 MMHG | TEMPERATURE: 98 F | DIASTOLIC BLOOD PRESSURE: 87 MMHG | HEART RATE: 110 BPM | WEIGHT: 175.94 LBS | RESPIRATION RATE: 16 BRPM

## 2024-06-03 DIAGNOSIS — N89.8 VAGINAL DISCHARGE: ICD-10-CM

## 2024-06-03 DIAGNOSIS — R35.0 FREQUENT URINATION: Primary | ICD-10-CM

## 2024-06-03 LAB
B-HCG UR QL: NEGATIVE
BILIRUB UR QL STRIP: POSITIVE
CTP QC/QA: YES
GLUCOSE UR QL STRIP: NEGATIVE
KETONES UR QL STRIP: NEGATIVE
LEUKOCYTE ESTERASE UR QL STRIP: POSITIVE
PH, POC UA: 6
POC BLOOD, URINE: NEGATIVE
POC NITRATES, URINE: NEGATIVE
PROT UR QL STRIP: NEGATIVE
SP GR UR STRIP: 1.03 (ref 1–1.03)
UROBILINOGEN UR STRIP-ACNC: POSITIVE (ref 0.1–1.1)

## 2024-06-03 PROCEDURE — 99213 OFFICE O/P EST LOW 20 MIN: CPT | Mod: S$GLB,,, | Performed by: FAMILY MEDICINE

## 2024-06-03 PROCEDURE — 81025 URINE PREGNANCY TEST: CPT | Mod: S$GLB,,, | Performed by: FAMILY MEDICINE

## 2024-06-03 PROCEDURE — 81003 URINALYSIS AUTO W/O SCOPE: CPT | Mod: QW,S$GLB,, | Performed by: FAMILY MEDICINE

## 2024-06-03 PROCEDURE — 87491 CHLMYD TRACH DNA AMP PROBE: CPT | Performed by: FAMILY MEDICINE

## 2024-06-03 PROCEDURE — 81514 NFCT DS BV&VAGINITIS DNA ALG: CPT | Performed by: FAMILY MEDICINE

## 2024-06-03 RX ORDER — METRONIDAZOLE 500 MG/1
500 TABLET ORAL EVERY 12 HOURS
Qty: 14 TABLET | Refills: 0 | Status: SHIPPED | OUTPATIENT
Start: 2024-06-03 | End: 2024-06-10

## 2024-06-03 RX ORDER — NITROFURANTOIN 25; 75 MG/1; MG/1
100 CAPSULE ORAL 2 TIMES DAILY
Qty: 14 CAPSULE | Refills: 0 | Status: SHIPPED | OUTPATIENT
Start: 2024-06-03 | End: 2024-06-10

## 2024-06-03 NOTE — PROGRESS NOTES
"Subjective:      Patient ID: Michael Gonzalez is a 28 y.o. female.    Vitals:  height is 5' 5" (1.651 m) and weight is 79.8 kg (175 lb 14.8 oz). Her oral temperature is 98.3 °F (36.8 °C). Her blood pressure is 120/87 and her pulse is 110. Her respiration is 16 and oxygen saturation is 98%.     Chief Complaint: Dysuria    One week copious yellow discharge. Then  with symptoms. Pt states the discharge started about a week ago. Pt also states abdominal pain and dysuria started yesterday.     Dysuria   This is a new problem. The current episode started in the past 7 days. The problem occurs every urination. The problem has been unchanged. The quality of the pain is described as aching and burning. The pain is at a severity of 3/10. The pain is mild. There has been no fever. She is Sexually active. Associated symptoms include a discharge, flank pain, frequency and urgency. She has tried nothing for the symptoms.       Genitourinary:  Positive for dysuria, frequency, urgency and flank pain.      Objective:     Physical Exam   Constitutional: She is oriented to person, place, and time.  Non-toxic appearance. She does not appear ill. No distress.   HENT:   Head: Normocephalic.   Ears:   Right Ear: External ear normal.   Left Ear: External ear normal.   Nose: Nose normal.   Mouth/Throat: Mucous membranes are moist.   Eyes: Conjunctivae are normal.   Cardiovascular: Tachycardia present.   Pulmonary/Chest: Effort normal. No respiratory distress.   Abdominal: There is abdominal tenderness.      Comments: Suprapubic tenderness   Musculoskeletal: Normal range of motion.         General: Normal range of motion.   Neurological: She is alert and oriented to person, place, and time.   Skin: Skin is dry.   Psychiatric: Her behavior is normal.     Results for orders placed or performed in visit on 06/03/24   POCT Urinalysis, Dipstick, Automated, W/O Scope   Result Value Ref Range    POC Blood, Urine Negative Negative    POC " Bilirubin, Urine Positive (A) Negative    POC Urobilinogen, Urine positive 0.1 - 1.1    POC Ketones, Urine Negative Negative    POC Protein, Urine Negative Negative    POC Nitrates, Urine Negative Negative    POC Glucose, Urine Negative Negative    pH, UA 6.0     POC Specific Gravity, Urine 1.030 (A) 1.003 - 1.029    POC Leukocytes, Urine Positive (A) Negative   POCT urine pregnancy   Result Value Ref Range    POC Preg Test, Ur Negative Negative     Acceptable Yes        Assessment:     1. Frequent urination    2. Vaginal discharge        Plan:       Frequent urination  -     POCT Urinalysis, Dipstick, Automated, W/O Scope  -     POCT urine pregnancy  -     nitrofurantoin, macrocrystal-monohydrate, (MACROBID) 100 MG capsule; Take 1 capsule (100 mg total) by mouth 2 (two) times daily. for 7 days  Dispense: 14 capsule; Refill: 0    Vaginal discharge  -     Vaginosis Screen by DNA Probe; Future; Expected date: 06/03/2024  -     C. trachomatis/N. gonorrhoeae by AMP DNA [facility]; Urine  -     metroNIDAZOLE (FLAGYL) 500 MG tablet; Take 1 tablet (500 mg total) by mouth every 12 (twelve) hours. for 7 days  Dispense: 14 tablet; Refill: 0      Will treat for uti given symptoms.  Clinic will follow results and treat as appropriate.   RTC PRN

## 2024-06-04 LAB
BACTERIAL VAGINOSIS DNA: POSITIVE
CANDIDA GLABRATA DNA: NEGATIVE
CANDIDA KRUSEI DNA: NEGATIVE
CANDIDA RRNA VAG QL PROBE: NEGATIVE
T VAGINALIS RRNA GENITAL QL PROBE: NEGATIVE

## 2024-06-05 ENCOUNTER — TELEPHONE (OUTPATIENT)
Dept: URGENT CARE | Facility: CLINIC | Age: 29
End: 2024-06-05
Payer: MEDICAID

## 2024-06-05 LAB
C TRACH DNA SPEC QL NAA+PROBE: NOT DETECTED
N GONORRHOEA DNA SPEC QL NAA+PROBE: DETECTED

## 2024-06-05 NOTE — TELEPHONE ENCOUNTER
----- Message from Ana Segundo MA sent at 6/4/2024  2:54 PM CDT -----    ----- Message -----  From: Shana Huerta  Sent: 6/4/2024   2:25 PM CDT  To: #    Type:  Test Results    Who Called: Michael Gonzalez     Name of Test (Lab/Mammo/Etc):  LAB    Date of Test: 06/03/2024    Where the test was performed: [facility]    Would the patient rather a call back or a response via [facility]?  the patient rather a call back     Best Call Back Number:     Additional Information:    Pt. Inquiring if there is any additional test results.  Requesting a call back.        Patient contacted.   Patient informed on results.  All questions answered on this encounter.    Patient reassured that g/c urine has not resulted as of yet.  Patient encouraged to complete full course of metronidazole due to +BV.

## 2024-06-05 NOTE — TELEPHONE ENCOUNTER
Problem: Infant Inpatient Plan of Care  Goal: Absence of Hospital-Acquired Illness or Injury  Intervention: Prevent Infection  Recent Flowsheet Documentation  Taken 2023 0930 by Kate Jean RN  Infection Prevention:   environmental surveillance performed   rest/sleep promoted     Problem:  Infant  Goal: Effective Oxygenation and Ventilation  Outcome: Progressing     Problem: Enteral Nutrition  Goal: Feeding Tolerance  Outcome: Progressing   Goal Outcome Evaluation:  Vital signs and assessment noted during shift, frequent drifting saturation noted, all self resolving, reflux symptoms noted during desaturations.  Voiding and stooling noted.                         Patient contacted.   Patient informed on results.  All questions answered on this encounter.    Patient reassured that the g/c urine test has not resulted as of yet.  Patient encouraged to complete metronidazole due to +BV.

## 2024-06-07 ENCOUNTER — TELEPHONE (OUTPATIENT)
Dept: URGENT CARE | Facility: CLINIC | Age: 29
End: 2024-06-07
Payer: MEDICAID

## 2024-06-07 NOTE — TELEPHONE ENCOUNTER
Patient went and got treated two days ago at [facility] urgent care.

## 2024-06-14 LAB — PAP RECOMMENDATION EXT: NORMAL

## 2024-07-24 ENCOUNTER — PATIENT OUTREACH (OUTPATIENT)
Dept: ADMINISTRATIVE | Facility: HOSPITAL | Age: 29
End: 2024-07-24
Payer: MEDICAID

## 2024-09-06 ENCOUNTER — OFFICE VISIT (OUTPATIENT)
Dept: URGENT CARE | Facility: CLINIC | Age: 29
End: 2024-09-06
Payer: MEDICAID

## 2024-09-06 VITALS
RESPIRATION RATE: 16 BRPM | HEART RATE: 85 BPM | WEIGHT: 175 LBS | TEMPERATURE: 99 F | HEIGHT: 65 IN | BODY MASS INDEX: 29.16 KG/M2 | SYSTOLIC BLOOD PRESSURE: 115 MMHG | OXYGEN SATURATION: 97 % | DIASTOLIC BLOOD PRESSURE: 82 MMHG

## 2024-09-06 DIAGNOSIS — R05.9 COUGH, UNSPECIFIED TYPE: ICD-10-CM

## 2024-09-06 DIAGNOSIS — J01.90 ACUTE VIRAL SINUSITIS: Primary | ICD-10-CM

## 2024-09-06 DIAGNOSIS — B97.89 ACUTE VIRAL SINUSITIS: Primary | ICD-10-CM

## 2024-09-06 LAB
CTP QC/QA: YES
SARS-COV-2 AG RESP QL IA.RAPID: NEGATIVE

## 2024-09-06 RX ORDER — AZELASTINE 1 MG/ML
1 SPRAY, METERED NASAL 2 TIMES DAILY
Qty: 30 ML | Refills: 0 | Status: SHIPPED | OUTPATIENT
Start: 2024-09-06 | End: 2025-09-06

## 2024-09-06 RX ORDER — CETIRIZINE HYDROCHLORIDE 10 MG/1
10 TABLET ORAL DAILY
Qty: 30 TABLET | Refills: 0 | Status: SHIPPED | OUTPATIENT
Start: 2024-09-06 | End: 2024-10-06

## 2024-09-06 RX ORDER — GUAIFENESIN 600 MG/1
1200 TABLET, EXTENDED RELEASE ORAL 2 TIMES DAILY
Qty: 40 TABLET | Refills: 0 | Status: SHIPPED | OUTPATIENT
Start: 2024-09-06 | End: 2024-09-16

## 2024-09-06 RX ORDER — FLUTICASONE PROPIONATE 50 MCG
1 SPRAY, SUSPENSION (ML) NASAL DAILY
Qty: 16 G | Refills: 0 | Status: SHIPPED | OUTPATIENT
Start: 2024-09-06

## 2024-09-06 NOTE — PROGRESS NOTES
"Subjective:      Patient ID: Michael Gonzalez is a 28 y.o. female.    Vitals:  height is 5' 5" (1.651 m) and weight is 79.4 kg (175 lb). Her oral temperature is 98.5 °F (36.9 °C). Her blood pressure is 115/82 and her pulse is 85. Her respiration is 16 and oxygen saturation is 97%.     Chief Complaint: Sinus Problem    Pt states she has been having sinus pain and pressure, green mucus, and runny nose congestion, dry cough and fever of 100.8 last night. Eating and drinking well, denies cp or sob.     Sinus Problem  This is a new problem. The current episode started in the past 7 days. The problem has been gradually worsening since onset. The maximum temperature recorded prior to her arrival was 100.4 - 100.9 F. The fever has been present for 3 to 4 days. Her pain is at a severity of 7/10. The pain is moderate. Associated symptoms include congestion, coughing, headaches, sinus pressure, sneezing and a sore throat. Pertinent negatives include no chills, diaphoresis, ear pain, hoarse voice, neck pain, shortness of breath or swollen glands. Treatments tried: allegra, flonase. The treatment provided no relief.       Constitution: Negative for activity change, appetite change, chills, sweating, fatigue, fever and unexpected weight change.   HENT:  Positive for congestion, sinus pressure and sore throat. Negative for ear pain.    Neck: Negative for neck pain.   Respiratory:  Positive for cough. Negative for chest tightness, sputum production, COPD and shortness of breath.    Allergic/Immunologic: Positive for sneezing.   Neurological:  Positive for headaches.      Objective:     Physical Exam   Constitutional: She is oriented to person, place, and time. She appears well-developed.  Non-toxic appearance. She does not appear ill. No distress.   HENT:   Head: Normocephalic and atraumatic.   Ears:   Right Ear: External ear normal.   Left Ear: External ear normal.   Nose: Nose normal.   Mouth/Throat: Oropharynx is clear and moist. "   Eyes: Conjunctivae, EOM and lids are normal. Pupils are equal, round, and reactive to light.   Neck: Trachea normal and phonation normal. Neck supple.   Pulmonary/Chest: Effort normal and breath sounds normal.   Musculoskeletal: Normal range of motion.         General: Normal range of motion.   Neurological: She is alert and oriented to person, place, and time.   Skin: Skin is warm, dry, intact and not diaphoretic.   Psychiatric: Her speech is normal and behavior is normal. Judgment and thought content normal.   Nursing note and vitals reviewed.    Results for orders placed or performed in visit on 09/06/24   SARS Coronavirus 2 Antigen, POCT Manual Read   Result Value Ref Range    SARS Coronavirus 2 Antigen Negative Negative     Acceptable Yes       Assessment:     1. Acute viral sinusitis    2. Cough, unspecified type        Plan:     Cv neg, vss, lungs ctab, encouraged nasal rinses, f/u if no improvement    Discussed results/diagnosis/plan with patient in clinic. Strict precautions given to patient to monitor for worsening signs and symptoms. Advised to follow up with PCP or specialist.    Explained side effects of medications prescribed with patient and informed him/her to discontinue use if he/she has any side effects and to inform UC or PCP if this occurs. All questions answered. Strict ED verses clinic return precautions stressed and given in depth. Advised if symptoms worsens of fail to improve he/she should go to the Emergency Room. Discharge and follow-up instructions given verbally/printed with the patient who expressed understanding and willingness to comply with my recommendations. Patient voiced understanding and in agreement with current treatment plan. Patient exits the exam room in no acute distress. Conversant and engaged during discharge discussion, verbalized understanding.      Acute viral sinusitis  -     azelastine (ASTELIN) 137 mcg (0.1 %) nasal spray; 1 spray (137 mcg total)  by Nasal route 2 (two) times daily.  Dispense: 30 mL; Refill: 0  -     fluticasone propionate (FLONASE) 50 mcg/actuation nasal spray; 1 spray (50 mcg total) by Each Nostril route once daily.  Dispense: 16 g; Refill: 0  -     cetirizine (ZYRTEC) 10 MG tablet; Take 1 tablet (10 mg total) by mouth once daily.  Dispense: 30 tablet; Refill: 0  -     guaiFENesin (MUCINEX) 600 mg 12 hr tablet; Take 2 tablets (1,200 mg total) by mouth 2 (two) times daily. for 10 days  Dispense: 40 tablet; Refill: 0    Cough, unspecified type  -     SARS Coronavirus 2 Antigen, POCT Manual Read             Additional MDM:     Heart Failure Score:   COPD = No

## 2024-09-06 NOTE — PATIENT INSTRUCTIONS
General Discharge Instructions   PLEASE READ YOUR DISCHARGE INSTRUCTIONS ENTIRELY AS IT CONTAINS IMPORTANT INFORMATION.  If you were prescribed a narcotic or controlled medication, do not drive or operate heavy equipment or machinery while taking these medications.  If you were prescribed antibiotics, please take them to completion.  You must understand that you've received an Urgent Care treatment only and that you may be released before all your medical problems are known or treated. You, the patient, will arrange for follow up care as instructed.    OVER THE COUNTER RECOMMENDATIONS/SUGGESTIONS.    Make sure to stay well hydrated.    Use Nasal Saline to mechanically move any post nasal drip from your eustachian tube or from the back of your throat.    Use warm salt water gargles to ease your throat pain. Warm salt water gargles as needed for sore throat- 1/2 tsp salt to 1 cup warm water, gargle as desired.    Use an antihistamine such as Claritin, Zyrtec or Allegra to dry you out.    Use pseudoephedrine (behind the counter) to decongest. Pseudoephedrine 30 mg up to 240 mg /day. It can raise your blood pressure and give you palpitations.    Use mucinex (guaifenesin) to break up mucous up to 2400mg/day to loosen any mucous.    The mucinex DM pill has a cough suppressant that can be sedating. It can be used at night to stop the tickle at the back of your throat.    You can use Mucinex D (it has guaifenesin and a high dose of pseudoephedrine) in the mornings to help decongest.    Use Afrin in each nare for no longer than 3 days, as it is addictive. It can also dry out your mucous membranes and cause elevated blood pressure. This is especially useful if you are flying.    Use Flonase 1-2 sprays/nostril per day. It is a local acting steroid nasal spray, if you develop a bloody nose, stop using the medication immediately.    Sometimes Nyquil at night is beneficial to help you get some rest, however it is sedating and it  does have an antihistamine, and tylenol.    Honey is a natural cough suppressant that can be used.    Tylenol up to 4,000 mg a day is safe for short periods and can be used for body aches, pain, and fever. However in high doses and prolonged use it can cause liver irritation.    Ibuprofen is a non-steroidal anti-inflammatory that can be used for body aches, pain, and fever.However it can also cause stomach irritation if over used.     Follow up with your PCP or specialty clinic as instructed in the next 2-3 days if not improved or as needed. You can call [phone] to schedule an appointment with appropriate provider.      If you condition worsens, we recommend that you receive another evaluation at the emergency room immediately or contact your primary medical clinic's after hours call service to discuss your concerns.      Please return here or go to the Emergency Department for any concerns or worsening condition.   You can also call [phone] to schedule an appointment with the appropriate provider.    Please return here or go to the Emergency Department for any concerns or worsening of condition.    Thank you for choosing [facility]!    Our goal in the Urgent Care is to always provide outstanding medical care. You may receive a survey by mail or e-mail in the next week regarding your experience today. We would greatly appreciate you completing and returning the survey. Your feedback provides us with a way to recognize our staff who provide very good care, and it helps us learn how to improve when your experience was below our aspiration of excellence.      We appreciate you trusting us with your medical care. We hope you feel better soon. We will be happy to take care of you for all of your future medical needs.    Sincerely,    LAURI Burgos  Follow up with your Primary Care Provider in 2-3 days if no improvement.  If you do not have one, please see the list provided and become  established with one.  If your condition worsens we recommend that you receive another evaluation at the emergency room immediately or contact your primary medical clinics after hours call service to discuss your concerns.  Flonase nasal spray as directed. Breathe right strips at night to help you breathe.  A cool mist humidifier in bedroom may help with cough and relieve stuffy nose.  Sore throat:  Lozenge, hard candy or honey.  Sinus rinses DO NOT USE TAP WATER, if you must, water must be a rolling boil for 1 minute, let it cool, then use.  May use distilled water.  Vics vapor rub in shower to help open nasal passages.  May use nasal gel to keep passages moisturized.  May use Nasal saline sprays during the day for added relief of congestion.   For those who go to the gym, please do not use the sauna or steam room now to clear sinuses.  During pollen season, change shirt if you are outside for a while when you go in.  Also wash your face.  Do not touch your face with your hands.  Wash your hands often in general while ill, avoid face contact with hands. Good nutrition. Lots of rest You may or may not have received a steroid injection, if you have, you may experience some jitters or develop nervousness.  You may also not rest well this night- these symptoms will resolve.  If you were give a prescription for steroids, do not medications such as Motrin, Advil, Ibuprofen, Aleve, Mobic, or Toradol while taking the steroid. these are non-steroidal anti-inflammatory medications which you do not need while taking steroids.  If you were given an antibiotic to take at home, please take it until it is completed even if you begin feeling better prior to the course of therapy being completed.  To attempt to minimize abdominal discomfort while taking antibiotics, you may try to take probiotics.  SEPARATE the antibiotics and probiotics by TWO hours, if not neither medication will work.  If you received a steroid shot today - this  can elevate your blood pressure, elevate your blood sugar, water weight gain, nervous energy, redness to the face and dimpling of the skin where the shot goes in.     You must understand that you've received an Urgent Care treatment only and that you may be released before all your medical problems are known or treated. You, the patient, will arrange for follow up care as instructed

## 2024-09-27 ENCOUNTER — OFFICE VISIT (OUTPATIENT)
Dept: FAMILY MEDICINE | Facility: CLINIC | Age: 29
End: 2024-09-27
Payer: MEDICAID

## 2024-09-27 VITALS
DIASTOLIC BLOOD PRESSURE: 64 MMHG | WEIGHT: 183.75 LBS | OXYGEN SATURATION: 99 % | HEIGHT: 65 IN | SYSTOLIC BLOOD PRESSURE: 110 MMHG | TEMPERATURE: 99 F | HEART RATE: 86 BPM | BODY MASS INDEX: 30.62 KG/M2

## 2024-09-27 DIAGNOSIS — R19.7 DIARRHEA, UNSPECIFIED TYPE: ICD-10-CM

## 2024-09-27 DIAGNOSIS — Z00.00 ROUTINE PHYSICAL EXAMINATION: Primary | ICD-10-CM

## 2024-09-27 DIAGNOSIS — F41.9 ANXIETY: ICD-10-CM

## 2024-09-27 DIAGNOSIS — Z23 PNEUMOCOCCAL VACCINATION ADMINISTERED AT CURRENT VISIT: ICD-10-CM

## 2024-09-27 PROBLEM — L02.11 NECK ABSCESS: Status: RESOLVED | Noted: 2023-11-12 | Resolved: 2024-09-27

## 2024-09-27 PROBLEM — M25.612 DECREASED RANGE OF MOTION OF LEFT SHOULDER: Status: RESOLVED | Noted: 2023-11-10 | Resolved: 2024-09-27

## 2024-09-27 PROBLEM — S27.899A: Status: RESOLVED | Noted: 2023-11-12 | Resolved: 2024-09-27

## 2024-09-27 PROBLEM — M25.611 DECREASED RIGHT SHOULDER RANGE OF MOTION: Status: RESOLVED | Noted: 2023-11-10 | Resolved: 2024-09-27

## 2024-09-27 PROBLEM — R29.898 WEAKNESS OF SHOULDER: Status: RESOLVED | Noted: 2023-11-10 | Resolved: 2024-09-27

## 2024-09-27 PROCEDURE — 99999 PR PBB SHADOW E&M-EST. PATIENT-LVL IV: CPT | Mod: PBBFAC,,, | Performed by: FAMILY MEDICINE

## 2024-09-27 PROCEDURE — 99214 OFFICE O/P EST MOD 30 MIN: CPT | Mod: PBBFAC,PO | Performed by: FAMILY MEDICINE

## 2024-09-27 NOTE — PROGRESS NOTES
[facility]  Progress Note    SUBJECTIVE:     Chief Complaint   Patient presents with    Annual Exam       HPI   Michael Gonzalez  is a 28 y.o. female here for routine physical exam. S/p thyroidectomy, currently followed by ENT and endo. Going through a lot of stress which pt requesting therapy. Patient has no other new complaints/problems at this time.      Review of patient's allergies indicates:   Allergen Reactions    Pseudoephedrine hcl Rash and Shortness Of Breath       Past Medical History:   Diagnosis Date    Allergy     Anxiety     Gallstones     PCOS (polycystic ovarian syndrome)      Past Surgical History:   Procedure Laterality Date    DISSECTION OF NECK Bilateral 10/31/2023    Procedure: DISSECTION, NECK;  Surgeon: Daphne Vazquez MD;  Location: [facility] OR;  Service: ENT;  Laterality: Bilateral;  possible bilateral    INCISION AND DRAINAGE, NECK Left 11/13/2023    Procedure: INCISION AND DRAINAGE NECK, REPAIR THORACIC DUCT,WITH HARRIETT DRAIN;  Surgeon: Daphne Vazquez MD;  Location: [facility] OR;  Service: ENT;  Laterality: Left;  with washout, placement of drain and possible repair of thoracic duct.  (This is a thyroid card please make changes so a card can be built.)    THYROIDECTOMY, BILATERAL Bilateral 10/31/2023    Procedure: THYROIDECTOMY,BILATERAL;  Surgeon: Daphne Vazquez MD;  Location: [facility] OR;  Service: ENT;  Laterality: Bilateral;  NEUROMONITORING MIREILLE HAIDER [phone]  RN PREOP 10/26/2023     Family History   Problem Relation Name Age of Onset    No Known Problems Mother      Diabetes Father      Hypertension Father       Social History     Tobacco Use    Smoking status: Former     Types: Cigarettes    Smokeless tobacco: Never    Tobacco comments:     socially   Substance Use Topics    Alcohol use: Yes     Comment: occiasionally    Drug use: No        Review of Systems   Constitutional:  Negative for chills, diaphoresis and fever.   HENT:  Negative for congestion, ear pain  and sore throat.    Eyes:  Negative for photophobia and discharge.   Respiratory:  Negative for cough, shortness of breath and wheezing.    Cardiovascular:  Negative for chest pain and palpitations.   Gastrointestinal:  Negative for abdominal pain, constipation, diarrhea, nausea and vomiting.   Genitourinary:  Negative for dysuria and hematuria.   Musculoskeletal:  Negative for back pain and myalgias.   Skin:  Negative for itching and rash.   Neurological:  Negative for dizziness, sensory change, focal weakness, weakness and headaches.   All other systems reviewed and are negative.    OBJECTIVE:     Vitals:    09/27/24 0820   BP: 110/64   Pulse: 86   Temp: 98.8 °F (37.1 °C)     Body mass index is 30.58 kg/m².    Physical Exam  Constitutional:       General: She is not in acute distress.     Appearance: She is not diaphoretic.   HENT:      Head: Normocephalic and atraumatic.      Right Ear: Tympanic membrane and ear canal normal. No hemotympanum. Tympanic membrane is not perforated, erythematous or bulging.      Left Ear: Tympanic membrane and ear canal normal. No hemotympanum. Tympanic membrane is not perforated, erythematous or bulging.   Eyes:      Conjunctiva/sclera: Conjunctivae normal.      Pupils: Pupils are equal, round, and reactive to light.   Neck:      Thyroid: No thyromegaly.   Cardiovascular:      Rate and Rhythm: Normal rate and regular rhythm.      Heart sounds: Normal heart sounds. No murmur heard.     No friction rub. No gallop.   Pulmonary:      Effort: Pulmonary effort is normal. No respiratory distress.      Breath sounds: Normal breath sounds. No wheezing or rales.   Abdominal:      General: Bowel sounds are normal. There is no distension.      Palpations: Abdomen is soft.      Tenderness: There is no abdominal tenderness. There is no guarding or rebound.   Musculoskeletal:         General: No tenderness. Normal range of motion.   Skin:     General: Skin is warm.      Findings: No erythema or  rash.   Neurological:      Mental Status: She is alert and oriented to person, place, and time.         Old records were reviewed. Labs and/or images were independently reviewed.    ASSESSMENT     1. Routine physical examination    2. Anxiety    3. Diarrhea, unspecified type    4. Pneumococcal vaccination administered at current visit        PLAN:     Routine physical examination  -     CBC Auto Differential; Future  -     Comprehensive Metabolic Panel; Future  -     Hemoglobin A1C; Future  -     Lipid Panel; Future  -     TSH; Future  -     T4, Free; Future  -     We briefly discussed diet, exercise, and routine preventive exams. All questions and comments addressed.    Anxiety  -     Ambulatory referral/consult to Psychiatry; Future; Expected date: 10/04/2024  -     for therapy.    Diarrhea, unspecified type  -     Tissue transglutaminase, IgA; Future; Expected date: 09/27/2024  -     IgA; Future; Expected date: 09/27/2024  -     LACTOSE TOLERANCE TEST; Future; Expected date: 09/27/2024    Pneumococcal vaccination administered at current visit  -     pneumoc 20-moses conj-dip cr(PF) (PREVNAR-20 (PF)) injection Syrg 0.5 mL      RTC PRN    Christos Land MD  09/27/2024 8:35 AM

## 2024-09-28 ENCOUNTER — NURSE TRIAGE (OUTPATIENT)
Dept: ADMINISTRATIVE | Facility: CLINIC | Age: 29
End: 2024-09-28
Payer: MEDICAID

## 2024-09-28 NOTE — TELEPHONE ENCOUNTER
Spoke with pt who reports that she received pneumonia vaccine yesterday. State last night she had fever, muscle aches. States today site is red, and hot to touch. Advised on home care. Verbalized understanding.    Reason for Disposition   Pneumococcal vaccine reactions    Additional Information   Negative: [1] Difficulty breathing or swallowing AND [2] starts within 2 hours after injection   Negative: Difficult to awaken or acting confused (e.g., disoriented, slurred speech)   Negative: Unresponsive, passed out, or very weak   Negative: Sounds like a life-threatening emergency to the triager   Negative: Fever > 104 F (40 C)   Negative: [1] Measles vaccine rash (onset day 6-12) AND [2] purple or blood-colored   Negative: Sounds like a severe, unusual reaction to the triager   Negative: [1] Redness or red streak around the injection site AND [2] begins > 48 hours after shot AND [3] no fever  (Exception: Red area < 1 inch or 2.5 cm wide.)   Negative: Fever present > 3 days (72 hours)   Negative: [1] Smallpox vaccine and [2] eye pain, eye redness, or rash on eyelids   Negative: [1] Over 3 days (72 hours) since shot AND [2] redness, swelling or pain getting worse   Negative: [1] Pain, tenderness, or swelling at the injection site AND [2] persists > 3 days   Negative: [1] Measles vaccine rash (onset day 6-12) AND [2] persists > 3 days   Negative: [1] Deep lump follows (in 2 to 8 weeks) Td or TDaP  shot AND [2] becomes tender to the touch   Negative: Immunization needed, questions about    Protocols used: Immunization [id number]

## 2024-09-30 ENCOUNTER — TELEPHONE (OUTPATIENT)
Dept: FAMILY MEDICINE | Facility: CLINIC | Age: 29
End: 2024-09-30
Payer: MEDICAID

## 2024-09-30 NOTE — TELEPHONE ENCOUNTER
Patient returned call to clinic. Patient stated arm is much better , no redness, pain is down and no more fever. Pt states she took ibuprofen and believes that helped the most. Patient advised to contact clinic if any further assistance is needed.

## 2024-10-01 ENCOUNTER — LAB VISIT (OUTPATIENT)
Dept: LAB | Facility: HOSPITAL | Age: 29
End: 2024-10-01
Attending: FAMILY MEDICINE
Payer: MEDICAID

## 2024-10-01 DIAGNOSIS — E73.9 LACTOSE INTOLERANCE: Primary | ICD-10-CM

## 2024-10-01 DIAGNOSIS — R19.7 DIARRHEA, UNSPECIFIED TYPE: ICD-10-CM

## 2024-10-01 DIAGNOSIS — Z00.00 ROUTINE PHYSICAL EXAMINATION: ICD-10-CM

## 2024-10-01 LAB
ALBUMIN SERPL BCP-MCNC: 3.7 G/DL (ref 3.5–5.2)
ALP SERPL-CCNC: 50 U/L (ref 55–135)
ALT SERPL W/O P-5'-P-CCNC: 14 U/L (ref 10–44)
ANION GAP SERPL CALC-SCNC: 4 MMOL/L (ref 8–16)
AST SERPL-CCNC: 15 U/L (ref 10–40)
BASOPHILS # BLD AUTO: 0.04 K/UL (ref 0–0.2)
BASOPHILS NFR BLD: 1.1 % (ref 0–1.9)
BILIRUB SERPL-MCNC: 0.3 MG/DL (ref 0.1–1)
BUN SERPL-MCNC: 12 MG/DL (ref 6–20)
CALCIUM SERPL-MCNC: 9.3 MG/DL (ref 8.7–10.5)
CHLORIDE SERPL-SCNC: 113 MMOL/L (ref 95–110)
CHOLEST SERPL-MCNC: 204 MG/DL (ref 120–199)
CHOLEST/HDLC SERPL: 2.1 {RATIO} (ref 2–5)
CO2 SERPL-SCNC: 24 MMOL/L (ref 23–29)
CREAT SERPL-MCNC: 0.7 MG/DL (ref 0.5–1.4)
DIFFERENTIAL METHOD BLD: ABNORMAL
EOSINOPHIL # BLD AUTO: 0.3 K/UL (ref 0–0.5)
EOSINOPHIL NFR BLD: 8.6 % (ref 0–8)
ERYTHROCYTE [DISTWIDTH] IN BLOOD BY AUTOMATED COUNT: 13.2 % (ref 11.5–14.5)
EST. GFR  (NO RACE VARIABLE): >60 ML/MIN/1.73 M^2
ESTIMATED AVG GLUCOSE: 82 MG/DL (ref 68–131)
GLUCOSE SERPL-MCNC: 92 MG/DL (ref 70–110)
HBA1C MFR BLD: 4.5 % (ref 4–5.6)
HCT VFR BLD AUTO: 39.9 % (ref 37–48.5)
HDLC SERPL-MCNC: 97 MG/DL (ref 40–75)
HDLC SERPL: 47.5 % (ref 20–50)
HGB BLD-MCNC: 12.6 G/DL (ref 12–16)
IGA SERPL-MCNC: 159 MG/DL (ref 40–350)
IMM GRANULOCYTES # BLD AUTO: 0 K/UL (ref 0–0.04)
IMM GRANULOCYTES NFR BLD AUTO: 0 % (ref 0–0.5)
LDLC SERPL CALC-MCNC: 99.4 MG/DL (ref 63–159)
LYMPHOCYTES # BLD AUTO: 1 K/UL (ref 1–4.8)
LYMPHOCYTES NFR BLD: 25.7 % (ref 18–48)
MCH RBC QN AUTO: 28.8 PG (ref 27–31)
MCHC RBC AUTO-ENTMCNC: 31.6 G/DL (ref 32–36)
MCV RBC AUTO: 91 FL (ref 82–98)
MONOCYTES # BLD AUTO: 0.3 K/UL (ref 0.3–1)
MONOCYTES NFR BLD: 7.2 % (ref 4–15)
NEUTROPHILS # BLD AUTO: 2.1 K/UL (ref 1.8–7.7)
NEUTROPHILS NFR BLD: 57.4 % (ref 38–73)
NONHDLC SERPL-MCNC: 107 MG/DL
NRBC BLD-RTO: 0 /100 WBC
PLATELET # BLD AUTO: 307 K/UL (ref 150–450)
PMV BLD AUTO: 9.4 FL (ref 9.2–12.9)
POTASSIUM SERPL-SCNC: 4.2 MMOL/L (ref 3.5–5.1)
PROT SERPL-MCNC: 7.6 G/DL (ref 6–8.4)
RBC # BLD AUTO: 4.38 M/UL (ref 4–5.4)
SODIUM SERPL-SCNC: 141 MMOL/L (ref 136–145)
T4 FREE SERPL-MCNC: 1.45 NG/DL (ref 0.71–1.51)
TRIGL SERPL-MCNC: 38 MG/DL (ref 30–150)
TSH SERPL DL<=0.005 MIU/L-ACNC: 0.22 UIU/ML (ref 0.4–4)
WBC # BLD AUTO: 3.73 K/UL (ref 3.9–12.7)

## 2024-10-01 PROCEDURE — 80053 COMPREHEN METABOLIC PANEL: CPT | Performed by: FAMILY MEDICINE

## 2024-10-01 PROCEDURE — 80061 LIPID PANEL: CPT | Performed by: FAMILY MEDICINE

## 2024-10-01 PROCEDURE — 86364 TISS TRNSGLTMNASE EA IG CLAS: CPT | Performed by: FAMILY MEDICINE

## 2024-10-01 PROCEDURE — 85025 COMPLETE CBC W/AUTO DIFF WBC: CPT | Performed by: FAMILY MEDICINE

## 2024-10-01 PROCEDURE — 84439 ASSAY OF FREE THYROXINE: CPT | Performed by: FAMILY MEDICINE

## 2024-10-01 PROCEDURE — 82784 ASSAY IGA/IGD/IGG/IGM EACH: CPT | Performed by: FAMILY MEDICINE

## 2024-10-01 PROCEDURE — 36415 COLL VENOUS BLD VENIPUNCTURE: CPT | Mod: PO | Performed by: FAMILY MEDICINE

## 2024-10-01 PROCEDURE — 83036 HEMOGLOBIN GLYCOSYLATED A1C: CPT | Performed by: FAMILY MEDICINE

## 2024-10-01 PROCEDURE — 84443 ASSAY THYROID STIM HORMONE: CPT | Performed by: FAMILY MEDICINE

## 2024-10-04 LAB — TTG IGA SER-ACNC: 0.2 U/ML

## 2024-10-07 ENCOUNTER — OFFICE VISIT (OUTPATIENT)
Dept: OTOLARYNGOLOGY | Facility: CLINIC | Age: 29
End: 2024-10-07
Payer: MEDICAID

## 2024-10-07 ENCOUNTER — LAB VISIT (OUTPATIENT)
Dept: LAB | Facility: HOSPITAL | Age: 29
End: 2024-10-07
Attending: OTOLARYNGOLOGY
Payer: MEDICAID

## 2024-10-07 VITALS
BODY MASS INDEX: 30.59 KG/M2 | DIASTOLIC BLOOD PRESSURE: 82 MMHG | SYSTOLIC BLOOD PRESSURE: 116 MMHG | HEIGHT: 65 IN | WEIGHT: 183.63 LBS

## 2024-10-07 DIAGNOSIS — C80.1 PAPILLARY CARCINOMA: Primary | ICD-10-CM

## 2024-10-07 DIAGNOSIS — R59.0 CERVICAL ADENOPATHY: ICD-10-CM

## 2024-10-07 DIAGNOSIS — C80.1 PAPILLARY CARCINOMA: ICD-10-CM

## 2024-10-07 PROCEDURE — 99213 OFFICE O/P EST LOW 20 MIN: CPT | Mod: S$GLB,,, | Performed by: OTOLARYNGOLOGY

## 2024-10-07 PROCEDURE — 3079F DIAST BP 80-89 MM HG: CPT | Mod: CPTII,S$GLB,, | Performed by: OTOLARYNGOLOGY

## 2024-10-07 PROCEDURE — 36415 COLL VENOUS BLD VENIPUNCTURE: CPT | Performed by: OTOLARYNGOLOGY

## 2024-10-07 PROCEDURE — 3044F HG A1C LEVEL LT 7.0%: CPT | Mod: CPTII,S$GLB,, | Performed by: OTOLARYNGOLOGY

## 2024-10-07 PROCEDURE — 84432 ASSAY OF THYROGLOBULIN: CPT | Performed by: OTOLARYNGOLOGY

## 2024-10-07 PROCEDURE — 86800 THYROGLOBULIN ANTIBODY: CPT | Performed by: OTOLARYNGOLOGY

## 2024-10-07 PROCEDURE — 3008F BODY MASS INDEX DOCD: CPT | Mod: CPTII,S$GLB,, | Performed by: OTOLARYNGOLOGY

## 2024-10-07 PROCEDURE — 3074F SYST BP LT 130 MM HG: CPT | Mod: CPTII,S$GLB,, | Performed by: OTOLARYNGOLOGY

## 2024-10-07 NOTE — PROGRESS NOTES
OTOLARYNGOLOGY CLINIC NOTE  Date:  10/07/2024     Chief complaint:  Chief Complaint   Patient presents with    Papillary carcinoma     4 month follow up ca surv. When she turns her head right there is a spot on the left side of her neck that stick out but is squishy       History of Present Illness  Michael Gonzalez is a 28 y.o. female  presenting today for a follow up    Nasal sprays did not help with hearing muffled   Feels a swollen area on the left side of the neck that kind of squishy   Came up a month ago , still numb so unclear if pain   Feels like she can't feel the vibration on the right ear ; stays the same not worse. Not worse in noisy environment   Ear lobe is regaining sensation   No fluid in ears audio 1-31-24     Past Medical History  Past Medical History:   Diagnosis Date    Allergy     Anxiety     Gallstones     PCOS (polycystic ovarian syndrome)         Past Surgical History  Past Surgical History:   Procedure Laterality Date    DISSECTION OF NECK Bilateral 10/31/2023    Procedure: DISSECTION, NECK;  Surgeon: Daphne Vazquez MD;  Location: [facility] OR;  Service: ENT;  Laterality: Bilateral;  possible bilateral    INCISION AND DRAINAGE, NECK Left 11/13/2023    Procedure: INCISION AND DRAINAGE NECK, REPAIR THORACIC DUCT,WITH HARRIETT DRAIN;  Surgeon: Daphne Vazquez MD;  Location: [facility] OR;  Service: ENT;  Laterality: Left;  with washout, placement of drain and possible repair of thoracic duct.  (This is a thyroid card please make changes so a card can be built.)    THYROIDECTOMY, BILATERAL Bilateral 10/31/2023    Procedure: THYROIDECTOMY,BILATERAL;  Surgeon: Daphne Vazquez MD;  Location: [facility] OR;  Service: ENT;  Laterality: Bilateral;  NEUROMONITORING MIREILLE HAIDER [phone]  RN PREOP 10/26/2023        Medications  Current Outpatient Medications on File Prior to Visit   Medication Sig Dispense Refill    azelastine (ASTELIN) 137 mcg (0.1 %) nasal spray 1 spray (137 mcg total) by Nasal route  "2 (two) times daily. 30 mL 3    azelastine (ASTELIN) 137 mcg (0.1 %) nasal spray 1 spray (137 mcg total) by Nasal route 2 (two) times daily. 30 mL 0    drospirenone-ethinyl estradioL (YOLY) 3-0.02 mg per tablet Take 1 tablet by mouth once daily.      fluticasone propionate (FLONASE) 50 mcg/actuation nasal spray 2 sprays (100 mcg total) by Each Nostril route 2 (two) times daily. 18.2 mL 3    levothyroxine (SYNTHROID) 125 MCG tablet Take 1 tablet (125 mcg total) by mouth before breakfast. 30 tablet 11     No current facility-administered medications on file prior to visit.       Review of Systems  Review of Systems   Constitutional:  Positive for malaise/fatigue.   HENT: Negative.     Eyes: Negative.    Respiratory: Negative.     Cardiovascular: Negative.    Gastrointestinal:  Positive for diarrhea.   Genitourinary: Negative.    Musculoskeletal: Negative.    Skin: Negative.    Neurological: Negative.    Psychiatric/Behavioral: Negative.          Social History   reports that she has quit smoking. Her smoking use included cigarettes. She has never used smokeless tobacco. She reports current alcohol use. She reports that she does not use drugs.     Family History  Family History   Problem Relation Name Age of Onset    No Known Problems Mother      Diabetes Father      Hypertension Father          Physical Exam   Vitals:    10/07/24 1031   BP: 116/82    Body mass index is 30.56 kg/m².  Weight: 83.3 kg (183 lb 10.3 oz)   Height: 5' 5" (165.1 cm)     GENERAL: no acute distress.  HEAD: normocephalic.   EYES: lids and lashes normal. No scleral icterus  EARS: external ear without lesion, normal pinna shape and position.  External auditory canal with normal cerumen, tympanic membrane fully visible, no perforation , no retraction. No middle ear effusion. Ossicles intact.   NOSE: external nose without significant bony abnormality  ORAL CAVITY/OROPHARYNX: tongue midline and mobile. Symmetric palate rise. Uvula midline.   NECK: " trachea midline.   LYMPH NODES: no overt cervical lymphadenopathy; left level 2/3 concern for possible enlarged node .  RESPIRATORY: no stridor, no stertor. Voice normal. Respirations nonlabored.  NEURO: alert, responds to questions appropriately.   PSYCH:mood appropriate      Imaging:  The patient does not have any pertinent and/or recent imaging of the head and neck.     Labs:  CBC  Recent Labs   Lab 11/11/23  1340 11/12/23  0428 10/01/24  0848   WBC 6.84 5.67 3.73 L   Hemoglobin 11.9 L 11.3 L 12.6   Hematocrit 36.7 L 34.4 L 39.9   MCV 89 88 91   Platelets 376 383 307     BMP  Recent Labs   Lab 11/13/23  0452 11/14/23  0511 10/01/24  0848   Glucose 106 95 92   Sodium 139 141 141   Potassium 4.1 4.6 4.2   Chloride 107 106 113 H   CO2 25 21 L 24   BUN 14 12 12   Creatinine 0.7 0.7 0.7   Calcium 9.0 8.9 9.3     COAGS  Recent Labs   Lab 11/11/23  1340   INR 1.0       Assessment  1. Papillary carcinoma  - THYROGLOBULIN; Future  - US Soft Tissue Head Neck; Future    2. Cervical adenopathy  - US Soft Tissue Head Neck; Future       Plan:  Discussed plan of care with patient in detail and all questions answered. Patient reported understanding of plan of care. I gave the patient the opportunity to ask questions and patient confirmed all questions answered to satisfaction.   Thyroglobulin ordered  Needs to get back in with endocrine  Ultrasound of neck ordered  F/u asap after ultrasound      Please be aware that this note has been generated with the assistance of Angela voice-to-text.  Please excuse any spelling or grammatical errors.

## 2024-10-08 LAB
THRYOGLOBULIN INTERPRETATION: NORMAL
THYROGLOB AB SERPL-ACNC: <1.8 IU/ML
THYROGLOB SERPL-MCNC: <0.1 NG/ML

## 2024-10-15 ENCOUNTER — HOSPITAL ENCOUNTER (OUTPATIENT)
Dept: RADIOLOGY | Facility: HOSPITAL | Age: 29
Discharge: HOME | End: 2024-10-15
Attending: OTOLARYNGOLOGY
Payer: MEDICAID

## 2024-10-15 DIAGNOSIS — R59.0 CERVICAL ADENOPATHY: ICD-10-CM

## 2024-10-15 DIAGNOSIS — C80.1 PAPILLARY CARCINOMA: ICD-10-CM

## 2024-10-15 PROCEDURE — 76536 US EXAM OF HEAD AND NECK: CPT | Mod: 26,,, | Performed by: INTERNAL MEDICINE

## 2024-10-15 PROCEDURE — 76536 US EXAM OF HEAD AND NECK: CPT | Mod: TC

## 2024-10-16 ENCOUNTER — OFFICE VISIT (OUTPATIENT)
Dept: OTOLARYNGOLOGY | Facility: CLINIC | Age: 29
End: 2024-10-16
Payer: MEDICAID

## 2024-10-16 VITALS
SYSTOLIC BLOOD PRESSURE: 116 MMHG | OXYGEN SATURATION: 98 % | DIASTOLIC BLOOD PRESSURE: 78 MMHG | WEIGHT: 183 LBS | HEIGHT: 65 IN | BODY MASS INDEX: 30.49 KG/M2 | HEART RATE: 72 BPM | RESPIRATION RATE: 16 BRPM

## 2024-10-16 DIAGNOSIS — E89.0 S/P COMPLETE THYROIDECTOMY: ICD-10-CM

## 2024-10-16 DIAGNOSIS — C80.1 PAPILLARY CARCINOMA: Primary | ICD-10-CM

## 2024-10-16 DIAGNOSIS — R59.0 CERVICAL ADENOPATHY: ICD-10-CM

## 2024-10-16 PROCEDURE — 99214 OFFICE O/P EST MOD 30 MIN: CPT | Mod: S$GLB,,, | Performed by: OTOLARYNGOLOGY

## 2024-10-16 PROCEDURE — 3044F HG A1C LEVEL LT 7.0%: CPT | Mod: CPTII,S$GLB,, | Performed by: OTOLARYNGOLOGY

## 2024-10-16 PROCEDURE — 1159F MED LIST DOCD IN RCRD: CPT | Mod: CPTII,S$GLB,, | Performed by: OTOLARYNGOLOGY

## 2024-10-16 PROCEDURE — 3008F BODY MASS INDEX DOCD: CPT | Mod: CPTII,S$GLB,, | Performed by: OTOLARYNGOLOGY

## 2024-10-16 PROCEDURE — 3078F DIAST BP <80 MM HG: CPT | Mod: CPTII,S$GLB,, | Performed by: OTOLARYNGOLOGY

## 2024-10-16 PROCEDURE — 3074F SYST BP LT 130 MM HG: CPT | Mod: CPTII,S$GLB,, | Performed by: OTOLARYNGOLOGY

## 2024-11-07 ENCOUNTER — HOSPITAL ENCOUNTER (OUTPATIENT)
Dept: INTERVENTIONAL RADIOLOGY/VASCULAR | Facility: HOSPITAL | Age: 29
Discharge: HOME | End: 2024-11-07
Attending: OTOLARYNGOLOGY
Payer: MEDICAID

## 2024-11-07 VITALS
RESPIRATION RATE: 18 BRPM | DIASTOLIC BLOOD PRESSURE: 77 MMHG | SYSTOLIC BLOOD PRESSURE: 116 MMHG | HEART RATE: 70 BPM | OXYGEN SATURATION: 100 %

## 2024-11-07 DIAGNOSIS — C80.1 PAPILLARY CARCINOMA: Primary | ICD-10-CM

## 2024-11-07 DIAGNOSIS — R59.0 CERVICAL ADENOPATHY: ICD-10-CM

## 2024-11-07 DIAGNOSIS — E89.0 S/P COMPLETE THYROIDECTOMY: ICD-10-CM

## 2024-11-07 PROCEDURE — 88184 FLOWCYTOMETRY/ TC 1 MARKER: CPT | Performed by: PATHOLOGY

## 2024-11-07 PROCEDURE — 88185 FLOWCYTOMETRY/TC ADD-ON: CPT | Performed by: PATHOLOGY

## 2024-11-07 PROCEDURE — 63600175 PHARM REV CODE 636 W HCPCS: Performed by: INTERNAL MEDICINE

## 2024-11-07 RX ORDER — LIDOCAINE HYDROCHLORIDE 10 MG/ML
INJECTION, SOLUTION INFILTRATION; PERINEURAL
Status: COMPLETED | OUTPATIENT
Start: 2024-11-07 | End: 2024-11-07

## 2024-11-07 RX ADMIN — LIDOCAINE HYDROCHLORIDE 5 ML: 10 INJECTION, SOLUTION INFILTRATION; PERINEURAL at 01:11

## 2024-11-07 NOTE — Clinical Note
Left: Neck.   Scrubbed with Chlorhexidine/Alcohol.    Hair: N/A.  Skin prep dry before draping.  Prepped by: Olu Torres MD 11/7/2024 1:14 PM.

## 2024-11-07 NOTE — H&P
VIR Pre-Procedure H&P      SUBJECTIVE:          History of Present Illness:  Michael Gonzalez is a 29 y.o. female who presents for node FNA.   Past Medical History:   Diagnosis Date    Allergy     Anxiety     Gallstones     PCOS (polycystic ovarian syndrome)      Past Surgical History:   Procedure Laterality Date    DISSECTION OF NECK Bilateral 10/31/2023    Procedure: DISSECTION, NECK;  Surgeon: Daphne Vazquez MD;  Location: [facility] OR;  Service: ENT;  Laterality: Bilateral;  possible bilateral    INCISION AND DRAINAGE, NECK Left 11/13/2023    Procedure: INCISION AND DRAINAGE NECK, REPAIR THORACIC DUCT,WITH HARRIETT DRAIN;  Surgeon: Daphne Vazquez MD;  Location: [facility] OR;  Service: ENT;  Laterality: Left;  with washout, placement of drain and possible repair of thoracic duct.  (This is a thyroid card please make changes so a card can be built.)    THYROIDECTOMY, BILATERAL Bilateral 10/31/2023    Procedure: THYROIDECTOMY,BILATERAL;  Surgeon: Daphne Vazquez MD;  Location: [facility] OR;  Service: ENT;  Laterality: Bilateral;  NEUROMONITORING MIREILLE HAIDER [phone]  RN PREOP 10/26/2023       Home Meds:   Prior to Admission medications    Medication Sig Start Date End Date Taking? Authorizing Provider   azelastine (ASTELIN) 137 mcg (0.1 %) nasal spray 1 spray (137 mcg total) by Nasal route 2 (two) times daily. 10/6/23   Daphne Vazquez MD   azelastine (ASTELIN) 137 mcg (0.1 %) nasal spray 1 spray (137 mcg total) by Nasal route 2 (two) times daily. 9/6/24 9/6/25  Kerry Watkins, NP   drospirenone-ethinyl estradioL (YOLY) 3-0.02 mg per tablet Take 1 tablet by mouth once daily. 8/25/23   Provider, Historical   fluticasone propionate (FLONASE) 50 mcg/actuation nasal spray 2 sprays (100 mcg total) by Each Nostril route 2 (two) times daily. 10/6/23   Daphne Vazquez MD   levothyroxine (SYNTHROID) 125 MCG tablet Take 1 tablet (125 mcg total) by mouth before breakfast. 11/15/23 11/14/24  Daphne Vazquez MD           Allergies:   Review of patient's allergies indicates:   Allergen Reactions    Pseudoephedrine hcl Rash and Shortness Of Breath             OBJECTIVE:     Vital Signs (Most Recent)  Pulse: 88 (11/07/24 1248)  Resp: 17 (11/07/24 1248)  BP: 118/79 (11/07/24 1250)  SpO2: 100 % (11/07/24 1248)    Physical Exam:     General: no acute distress  Mental Status: alert and oriented to person, place and time  HEENT: normocephalic, atraumatic  Chest: unlabored breathing  Heart: regular heart rate  Abdomen: nondistended  Extremity: moves all extremities    Laboratory  Lab Results   Component Value Date    INR 1.0 11/11/2023       Lab Results   Component Value Date    WBC 3.73 (L) 10/01/2024    HGB 12.6 10/01/2024    HCT 39.9 10/01/2024    MCV 91 10/01/2024     10/01/2024      Lab Results   Component Value Date    GLU 92 10/01/2024     10/01/2024    K 4.2 10/01/2024     (H) 10/01/2024    CO2 24 10/01/2024    BUN 12 10/01/2024    CREATININE 0.7 10/01/2024    CALCIUM 9.3 10/01/2024    ALT 14 10/01/2024    AST 15 10/01/2024    ALBUMIN 3.7 10/01/2024    BILITOT 0.3 10/01/2024       ASSESSMENT/PLAN:     Sedation Plan: local  Patient will undergo lymph node FNA.      Olu Torres MD  VIR

## 2024-11-07 NOTE — PROCEDURES
Pre Op Diagnosis:  Lymph node biopsy       Post Op Diagnosis: same     Procedure:  Imaged guided biopsy of left cervical lymph node     Procedure performed by: Olu Torres MD       Written Informed Consent Obtained: Yes     Specimen Removed: Yes. Core samples       Estimated Blood Loss: Minimal     Findings:      Successful image-guided FNA biopsy of left cervical lymph node.   5 passes evaluated by path.      Olu Torres MD  VIR

## 2024-11-07 NOTE — DISCHARGE SUMMARY
VIR Discharge Summary      Hospital Course: No complications    Admit Date: 11/7/2024  Discharge Date: 11/07/2024     Instructions Given to Patient: Yes  Diet: Resume prior diet  Activity:    routine    Description of Condition on Discharge: Stable  Vital Signs (Most Recent): Pulse: 87 (11/07/24 1324)  Resp: 18 (11/07/24 1324)  BP: 126/82 (11/07/24 1324)  SpO2: 99 % (11/07/24 1324)    Discharge Disposition: Home    Discharge Diagnosis: lymph nodes     Olu Torres MD  VIR

## 2024-11-08 ENCOUNTER — TELEPHONE (OUTPATIENT)
Dept: INTERVENTIONAL RADIOLOGY/VASCULAR | Facility: HOSPITAL | Age: 29
End: 2024-11-08
Payer: MEDICAID

## 2024-11-09 LAB
BDY SITE: NORMAL
ENDOCRINOLOGIST REVIEW: NORMAL
SPECIMEN SOURCE: NORMAL
THYROGLOB LN FNA-MCNC: <0.1 NG/ML
THYROGLOB TISS FNA-MCNC: NORMAL NG/ML

## 2024-11-18 ENCOUNTER — OFFICE VISIT (OUTPATIENT)
Dept: OTOLARYNGOLOGY | Facility: CLINIC | Age: 29
End: 2024-11-18
Payer: MEDICAID

## 2024-11-18 VITALS
HEIGHT: 65 IN | SYSTOLIC BLOOD PRESSURE: 126 MMHG | DIASTOLIC BLOOD PRESSURE: 80 MMHG | BODY MASS INDEX: 30.49 KG/M2 | WEIGHT: 183 LBS

## 2024-11-18 DIAGNOSIS — E89.0 S/P COMPLETE THYROIDECTOMY: ICD-10-CM

## 2024-11-18 DIAGNOSIS — R59.0 CERVICAL ADENOPATHY: ICD-10-CM

## 2024-11-18 DIAGNOSIS — C80.1 PAPILLARY CARCINOMA: Primary | ICD-10-CM

## 2024-11-18 DIAGNOSIS — I89.0 LYMPHEDEMA: ICD-10-CM

## 2024-11-18 PROCEDURE — 3074F SYST BP LT 130 MM HG: CPT | Mod: CPTII,S$GLB,, | Performed by: OTOLARYNGOLOGY

## 2024-11-18 PROCEDURE — 3079F DIAST BP 80-89 MM HG: CPT | Mod: CPTII,S$GLB,, | Performed by: OTOLARYNGOLOGY

## 2024-11-18 PROCEDURE — 99213 OFFICE O/P EST LOW 20 MIN: CPT | Mod: S$GLB,,, | Performed by: OTOLARYNGOLOGY

## 2024-11-18 PROCEDURE — 3044F HG A1C LEVEL LT 7.0%: CPT | Mod: CPTII,S$GLB,, | Performed by: OTOLARYNGOLOGY

## 2024-11-18 PROCEDURE — 1159F MED LIST DOCD IN RCRD: CPT | Mod: CPTII,S$GLB,, | Performed by: OTOLARYNGOLOGY

## 2024-11-18 PROCEDURE — 3008F BODY MASS INDEX DOCD: CPT | Mod: CPTII,S$GLB,, | Performed by: OTOLARYNGOLOGY

## 2024-11-22 ENCOUNTER — TELEPHONE (OUTPATIENT)
Dept: OTOLARYNGOLOGY | Facility: CLINIC | Age: 29
End: 2024-11-22
Payer: MEDICAID

## 2024-11-22 NOTE — TELEPHONE ENCOUNTER
"  Patient Education   Concussion Discharge Instructions  You were seen today for signs of a concussion. The symptoms will vary, depending on the nature of your injury and your health. You may have: headache, confusion, nausea (feel sick to your stomach), vomiting (throwing up) and problems with memory, concentrating or sleep. You may feel dizzy, irritable, and tired.   Children and teens may need help from their parents, teachers and coaches to watch for symptoms as they recover.  Follow-up  It is important for you to see a doctor for follow-up care to see how you are recovering. Please see your primary doctor within the next 5 to 7 days. You may have also been referred to the Concussion  service. They will contact you and arrange a follow-up visit if needed. If you need help sooner, you may call them at [phone].  Warning signs  Call your doctor or come back to Emergency if you suddenly have any of these symptoms:    Headaches that get worse    Feeling more and more drowsy    You keep repeating yourself    Strange behavior    Seizures    Repeat vomiting (throwing up)    Trouble walking    Growing confusion    Feeling more irritable    Neck pain that gets worse    Slurred speech    Weakness or numbness    Loss of consciousness    Fluid or blood coming from ears or nose  Self-care    Get lots of rest and get enough sleep at night. Take daytime naps or rest if you feel tired.    Limit physical activity and \"thinking\" activities. These can make symptoms worse.  ? Physical activity includes gym, sports, weight training, running, exercise and heavy lifting.  ? Thinking activities include homework, class work, job-related work and screen time (phone, computer, tablet, TV and video games).    Stick to a healthy diet and drink lots of fluids.    As symptoms improve, you may slowly return to your daily activities. If symptoms get worse   or return, reduce your activity.    Know that it is normal to feel sad and " ----- Message from Sylvie sent at 11/22/2024  9:45 AM CST -----  Regarding: self  Who Called: self    Who Left Message for Patient: Pt said Teri    Does the patient know what this is regarding?: she does not know, vm said ENT office     Would the patient rather a call back or a response via [facility]?  Call back    Best Call Back Number: [phone]      Additional Information:    Thank you.   frustrated when you do not feel right and are less active.  Going back to work    Your care team will tell you when you are ready to return to work.    Limit the amount of work you do soon after your injury. This may speed healing. Take breaks if your symptoms get worse. You should also reduce your physical activity as well as activities that require a lot of thinking until you see your doctor.    You may need shorter work days and a lighter workload.    Avoid heavy lifting, working with machinery, driving and working at heights until your symptoms are gone or you are cleared by a doctor.  Returning to sports    Never return to play if you have any symptoms. A full recovery will reduce the chances of getting hurt again. Remember, it is better to miss one or two games than a whole season.    You should rest from all physical activity until you see your doctor. Generally, if all symptoms have completely cleared, your doctor can help guide you to slowly return to sports. If symptoms return or worsen, stop the activity and see your doctor.    Important: If you are in an organized sport and under age 18, you will need written consent from a healthcare provider before you return to sports. Typically, this will be your primary care or sports medicine doctor. Please make an appointment.  Going back to school    If you are still having symptoms, you may need extra help at school.    Tell your teachers and school nurse about your injury and symptoms. Ask them to watch for problems with learning, memory and concentrating. Symptoms may get worse when you do schoolwork, and you may become more irritable.    You may need shorter school days, a reduced workload, and to postpone testing.    Do not drive or take gym class (physical activity) until cleared by a doctor.    For informational purposes only. Not to replace the advice of your health care provider.   2009 [employer]. Used with permission.  "This form is adapted from the \"Heads Up: Brain Injury in Your Practice\" tool kit developed by the Centers for Disease Control and Prevention (CDC). All rights reserved. [facility]. [employer] [id number] - Rev 03/17.       Patient Education   Healing after a Concussion  Rest  Rest is the best treatment for a concussion. Avoid physical activity until you see your doctor. Avoid activities that cause your symptoms to get worse or make you feel tired. This includes physical activities, watching TV, texting or playing video games.  Don't nap during the day. If you do nap, make sure it is for less than an hour and before 3 p.m. If you find it is hard to fall asleep, talk to your doctor. You may need medicine to help you sleep.  If symptoms have not become worse, you do not need to be wakened and checked on at night.  School  You can rest your brain by staying at home for 1 to 2 days. It is best to get back into the school routine.   At school, you may have trouble taking tests or working on a computer. Symptoms may get worse in band, choir, busy classes or a noisy lunchroom. A doctor can work with the school if you need a plan to help you succeed.  Work  You may need to change your work routine as you recover. A doctor can help you create a plan for the conditions at your job.  Treat pain    It is best to avoid taking medicines, but if needed, take Tylenol (acetaminophen) for headaches and pain every 4 to 6 hours.    Do not take drugstore medicines such as ibuprofen, Advil, Motrin, Benadryl, Aleve, sleep aids or Tylenol PM. These drugs may cause new problems.    If you cannot manage your pain with Tylenol, call your doctor or go to the emergency department.  Watch symptoms closely  Each day, keep track of your symptoms. This will help your doctor see how well you are healing. Write down the symptom, how often it occurs, how long it lasts, and what makes it better or worse.  Possible symptoms: headache, stomach " upset, feeling confused or dizzy, motion sickness, and personality changes.  Returning to activity  Take your time returning to activity. A doctor can help you decide what levels of activity are best for you. If you're returning to a sport, you should see a health care provider before you do.  If you have questions, call  Your doctor, Concussion hotline: [phone], or Athletic medicine: [phone].   For informational purposes only. Not to replace the advice of your health care provider.  Copyright   2014 [employer]. All rights reserved. Zoe Majeste 724271 - Rev 12/16.

## 2025-01-30 ENCOUNTER — HOSPITAL ENCOUNTER (OUTPATIENT)
Dept: RADIOLOGY | Facility: HOSPITAL | Age: 30
Discharge: HOME | End: 2025-01-30
Attending: OTOLARYNGOLOGY
Payer: MEDICAID

## 2025-01-30 DIAGNOSIS — C80.1 PAPILLARY CARCINOMA: ICD-10-CM

## 2025-01-30 PROCEDURE — 76536 US EXAM OF HEAD AND NECK: CPT | Mod: TC

## 2025-01-30 PROCEDURE — 76536 US EXAM OF HEAD AND NECK: CPT | Mod: 26,,, | Performed by: INTERNAL MEDICINE

## 2025-02-06 ENCOUNTER — OFFICE VISIT (OUTPATIENT)
Dept: OTOLARYNGOLOGY | Facility: CLINIC | Age: 30
End: 2025-02-06
Payer: MEDICAID

## 2025-02-06 VITALS
SYSTOLIC BLOOD PRESSURE: 130 MMHG | HEIGHT: 65 IN | WEIGHT: 183 LBS | DIASTOLIC BLOOD PRESSURE: 82 MMHG | BODY MASS INDEX: 30.49 KG/M2

## 2025-02-06 DIAGNOSIS — R59.9 LYMPH NODE ENLARGEMENT: ICD-10-CM

## 2025-02-06 DIAGNOSIS — E89.0 S/P COMPLETE THYROIDECTOMY: Primary | ICD-10-CM

## 2025-02-06 DIAGNOSIS — C80.1 PAPILLARY CARCINOMA: ICD-10-CM

## 2025-02-06 DIAGNOSIS — I89.0 LYMPHEDEMA: ICD-10-CM

## 2025-02-06 NOTE — PROGRESS NOTES
OTOLARYNGOLOGY CLINIC NOTE  Date:  02/06/2025     Chief complaint:  Chief Complaint   Patient presents with    hx Papillary carcinoma     Follow up       History of Present Illness  Michael Gonzalez is a 29 y.o. female  presenting today for a followup.  Has not noticed any new neck masses. No enlargement of nodes that we have been following   Overall doing well    I last saw the patient on 11-18 - 24. Below text is copied from  note on that date describing history of present illness at that time :  Node is more palpable to her  when not having swelling in neck ; swelling that comes and goes- description sounds like lymphedema.     Plan from that visit copied below:  Bring disc in with nuc med images   Pressure dressing prn for edema- gave photo  Ultrasound in January to follow nodes  FNA results reviewed, negative tg washout  I think reasonable to monitor, offered to do excisional biopsy she also prefers to monitor. Has seen endocrine ; if endocrine concerned willing to do excisional biopsy but I do think it feels slightly smaller today and overall workup is reassuring although as I discussed with her the only way to know definitively is removal. Would recommend removal if additional nodes or if changes on f/u imaging.   Past Medical History  Past Medical History:   Diagnosis Date    Allergy     Anxiety     Gallstones     PCOS (polycystic ovarian syndrome)         Past Surgical History  Past Surgical History:   Procedure Laterality Date    DISSECTION OF NECK Bilateral 10/31/2023    Procedure: DISSECTION, NECK;  Surgeon: Daphne Vazquez MD;  Location: [facility] OR;  Service: ENT;  Laterality: Bilateral;  possible bilateral    INCISION AND DRAINAGE, NECK Left 11/13/2023    Procedure: INCISION AND DRAINAGE NECK, REPAIR THORACIC DUCT,WITH HARRIETT DRAIN;  Surgeon: Daphne Vazquez MD;  Location: [facility] OR;  Service: ENT;  Laterality: Left;  with washout, placement of drain and possible repair of thoracic duct.  (This is a  "thyroid card please make changes so a card can be built.)    THYROIDECTOMY, BILATERAL Bilateral 10/31/2023    Procedure: THYROIDECTOMY,BILATERAL;  Surgeon: Daphne Vazquez MD;  Location: [facility];  Service: ENT;  Laterality: Bilateral;  NEUROMONITORING MIREILLE HAIDER [phone]  RN PREOP 10/26/2023        Medications  Current Outpatient Medications on File Prior to Visit   Medication Sig Dispense Refill    azelastine (ASTELIN) 137 mcg (0.1 %) nasal spray 1 spray (137 mcg total) by Nasal route 2 (two) times daily. 30 mL 3    azelastine (ASTELIN) 137 mcg (0.1 %) nasal spray 1 spray (137 mcg total) by Nasal route 2 (two) times daily. 30 mL 0    drospirenone-ethinyl estradioL (YOLY) 3-0.02 mg per tablet Take 1 tablet by mouth once daily.      fluticasone propionate (FLONASE) 50 mcg/actuation nasal spray 2 sprays (100 mcg total) by Each Nostril route 2 (two) times daily. 18.2 mL 3    levothyroxine (SYNTHROID) 125 MCG tablet Take 1 tablet (125 mcg total) by mouth before breakfast. 30 tablet 11     No current facility-administered medications on file prior to visit.       Review of Systems  Review of Systems   Constitutional:  Negative for fever.   HENT:  Negative for sore throat.    Respiratory:  Negative for hemoptysis.    Musculoskeletal:  Negative for neck pain.        Social History   reports that she has quit smoking. Her smoking use included cigarettes. She has never used smokeless tobacco. She reports current alcohol use. She reports that she does not use drugs.     Family History  Family History   Problem Relation Name Age of Onset    No Known Problems Mother      Diabetes Father      Hypertension Father          Physical Exam   Vitals:    02/06/25 0946   BP: 130/82    Body mass index is 30.45 kg/m².  Weight: 83 kg (182 lb 15.7 oz)   Height: 5' 5" (165.1 cm)     GENERAL: no acute distress.  HEAD: normocephalic.   EYES: No scleral icterus  EARS: external ear without lesion, normal pinna shape and position. " "  NOSE: external nose without significant bony abnormality  ORAL CAVITY/OROPHARYNX: tongue mobile.   NECK: trachea midline. Well healed apron incision scar  LYMPH NODES:No cervical lymphadenopathy- no significantly enlarged/palpable node on right.  Left slightly palpable but not as large as when initially noticed - benign fna last year when came up,. No changes in size .  RESPIRATORY: no stridor, no stertor. Voice normal. Respirations nonlabored.  NEURO: alert, responds to questions appropriately.    PSYCH:mood appropriate      Imaging:  The patient does have any new imaging of the head and neck since last visit. ultrasound images and report personally reviewed and reviewed with patient. Radiology report in quotations below  " No size significant lymphadenopathy.  The largest lymph node is located in the R2 station and measures 1.6 x 0.9 x 0.9 cm, with a questionable fatty hilum.     Left thyroid bed:     No nodules.     No size significant lymphadenopathy.  There is a cluster of lymph nodes in the L2 station measuring up to 0.5 cm short axis, 1 of which was recently sampled as benign.     Impression:     Postoperative changes from total thyroidectomy for papillary thyroid cancer.  No local recurrence.     Prominent subcentimeter lymph node with a questionable fatty hilum in the R2 station, indeterminate.   "      October    Labs:  CBC  Recent Labs   Lab 11/11/23  1340 11/12/23  0428 10/01/24  0848   WBC 6.84 5.67 3.73 L   Hemoglobin 11.9 L 11.3 L 12.6   Hematocrit 36.7 L 34.4 L 39.9   MCV 89 88 91   Platelets 376 383 307     BMP  Recent Labs   Lab 11/13/23  0452 11/14/23  0511 10/01/24  0848   Glucose 106 95 92   Sodium 139 141 141   Potassium 4.1 4.6 4.2   Chloride 107 106 113 H   CO2 25 21 L 24   BUN 14 12 12   Creatinine 0.7 0.7 0.7   Calcium 9.0 8.9 9.3     COAGS  Recent Labs   Lab 11/11/23  1340   INR 1.0       Assessment  1. S/P complete thyroidectomy  - US Soft Tissue Head Neck; Future    2. Lymph node " enlargement  - US Soft Tissue Head Neck; Future    3. Papillary carcinoma    4. Lymphedema       Plan:  Discussed plan of care with patient in detail and all questions answered. Patient reported understanding of plan of care. I gave the patient the opportunity to ask questions and patient confirmed all questions answered to satisfaction.     I think node on right looks better on ultrasound compared to previous ultrasound- hilum more visible on my read   Left nodes look same as well ; offered fna for right side but could also monitor which she prefers.  I think reasonable as well to monitor, favor overall benign appearance will get ultrasound in 6 months  Lymphedema stable  Has seen endo at [facility] - reviewed notes, TG undetectable    F/u 6 months with ultrasound      Please be aware that this note has been generated with the assistance of MModal voice-to-text.  Please excuse any spelling or grammatical errors.

## 2025-03-31 ENCOUNTER — OFFICE VISIT (OUTPATIENT)
Dept: URGENT CARE | Facility: CLINIC | Age: 30
End: 2025-03-31
Payer: MEDICAID

## 2025-03-31 ENCOUNTER — TELEPHONE (OUTPATIENT)
Dept: ORTHOPEDICS | Facility: CLINIC | Age: 30
End: 2025-03-31
Payer: MEDICAID

## 2025-03-31 VITALS
HEART RATE: 88 BPM | HEIGHT: 65 IN | OXYGEN SATURATION: 99 % | BODY MASS INDEX: 29.9 KG/M2 | TEMPERATURE: 99 F | SYSTOLIC BLOOD PRESSURE: 136 MMHG | WEIGHT: 179.44 LBS | DIASTOLIC BLOOD PRESSURE: 90 MMHG | RESPIRATION RATE: 18 BRPM

## 2025-03-31 DIAGNOSIS — S60.031A CONTUSION OF RIGHT MIDDLE FINGER WITHOUT DAMAGE TO NAIL, INITIAL ENCOUNTER: ICD-10-CM

## 2025-03-31 DIAGNOSIS — S67.10XA CRUSHING INJURY OF FINGER OF RIGHT HAND: ICD-10-CM

## 2025-03-31 DIAGNOSIS — S60.021A CONTUSION OF RIGHT INDEX FINGER WITHOUT DAMAGE TO NAIL, INITIAL ENCOUNTER: ICD-10-CM

## 2025-03-31 DIAGNOSIS — S62.644A CLOSED NONDISPLACED FRACTURE OF PROXIMAL PHALANX OF RIGHT RING FINGER, INITIAL ENCOUNTER: Primary | ICD-10-CM

## 2025-03-31 PROCEDURE — 29130 APPL FINGER SPLINT STATIC: CPT | Mod: RT,S$GLB,, | Performed by: PHYSICIAN ASSISTANT

## 2025-03-31 PROCEDURE — 73130 X-RAY EXAM OF HAND: CPT | Mod: RT,S$GLB,, | Performed by: RADIOLOGY

## 2025-03-31 PROCEDURE — 99213 OFFICE O/P EST LOW 20 MIN: CPT | Mod: 25,S$GLB,, | Performed by: PHYSICIAN ASSISTANT

## 2025-03-31 RX ORDER — IBUPROFEN 800 MG/1
800 TABLET ORAL EVERY 6 HOURS PRN
Qty: 30 TABLET | Refills: 0 | Status: SHIPPED | OUTPATIENT
Start: 2025-03-31

## 2025-03-31 NOTE — TELEPHONE ENCOUNTER
Spoke with pt in regards to scheduling appt, stated to pt we have no NEW medicaid slots at this time. Pt was given number to [facility] orthoped[facility]. Pt verbalize understands.

## 2025-03-31 NOTE — TELEPHONE ENCOUNTER
----- Message from Shaye sent at 3/31/2025  4:47 PM CDT -----  Regarding: PT ADVICE  Contact: PT  Pt called regarding scheduling an appt per referral. Unable to schedule pt. Please advise. Pt can be reached at [phone]
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
## 2025-04-01 ENCOUNTER — OFFICE VISIT (OUTPATIENT)
Dept: ORTHOPEDICS | Facility: CLINIC | Age: 30
End: 2025-04-01
Payer: MEDICAID

## 2025-04-01 DIAGNOSIS — S62.644A NONDISPLACED FRACTURE OF PROXIMAL PHALANX OF RIGHT RING FINGER, INITIAL ENCOUNTER FOR CLOSED FRACTURE: Primary | ICD-10-CM

## 2025-04-01 DIAGNOSIS — S67.21XA CRUSHING INJURY OF RIGHT HAND, INITIAL ENCOUNTER: ICD-10-CM

## 2025-04-01 PROCEDURE — 3044F HG A1C LEVEL LT 7.0%: CPT | Mod: CPTII,,,

## 2025-04-01 PROCEDURE — 99999 PR PBB SHADOW E&M-EST. PATIENT-LVL III: CPT | Mod: PBBFAC,,,

## 2025-04-01 PROCEDURE — 99204 OFFICE O/P NEW MOD 45 MIN: CPT | Mod: S$PBB,,,

## 2025-04-01 PROCEDURE — 1159F MED LIST DOCD IN RCRD: CPT | Mod: CPTII,,,

## 2025-04-01 PROCEDURE — 1160F RVW MEDS BY RX/DR IN RCRD: CPT | Mod: CPTII,,,

## 2025-04-01 PROCEDURE — 99213 OFFICE O/P EST LOW 20 MIN: CPT | Mod: PBBFAC,PN
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
## 2025-04-24 ENCOUNTER — TELEPHONE (OUTPATIENT)
Dept: ORTHOPEDICS | Facility: CLINIC | Age: 30
End: 2025-04-24
Payer: MEDICAID

## 2025-04-24 DIAGNOSIS — S67.21XA CRUSHING INJURY OF RIGHT HAND, INITIAL ENCOUNTER: Primary | ICD-10-CM

## 2025-07-08 ENCOUNTER — TELEPHONE (OUTPATIENT)
Dept: OTOLARYNGOLOGY | Facility: CLINIC | Age: 30
End: 2025-07-08
Payer: MEDICAID

## 2025-07-08 NOTE — TELEPHONE ENCOUNTER
Tried calling patient to rescheduled appointment for that on 8/15 due to Dr. Vazquez being out the office that day. Unable to reach. Will send new appointment letter in mail and send a portal message.

## 2025-08-18 ENCOUNTER — HOSPITAL ENCOUNTER (OUTPATIENT)
Dept: RADIOLOGY | Facility: HOSPITAL | Age: 30
Discharge: HOME | End: 2025-08-18
Attending: OTOLARYNGOLOGY
Payer: MEDICAID

## 2025-08-18 DIAGNOSIS — Z98.890 S/P COMPLETE THYROIDECTOMY: ICD-10-CM

## 2025-08-18 DIAGNOSIS — R59.9 LYMPH NODE ENLARGEMENT: ICD-10-CM

## 2025-08-18 DIAGNOSIS — Z90.89 S/P COMPLETE THYROIDECTOMY: ICD-10-CM

## 2025-08-18 PROCEDURE — 76536 US EXAM OF HEAD AND NECK: CPT | Mod: TC

## 2025-08-25 ENCOUNTER — OFFICE VISIT (OUTPATIENT)
Dept: OTOLARYNGOLOGY | Facility: CLINIC | Age: 30
End: 2025-08-25
Payer: MEDICAID

## 2025-08-25 VITALS
HEIGHT: 65 IN | SYSTOLIC BLOOD PRESSURE: 144 MMHG | DIASTOLIC BLOOD PRESSURE: 93 MMHG | WEIGHT: 179.44 LBS | BODY MASS INDEX: 29.9 KG/M2

## 2025-08-25 DIAGNOSIS — C80.1 PAPILLARY CARCINOMA: ICD-10-CM

## 2025-08-25 DIAGNOSIS — G47.33 OSA (OBSTRUCTIVE SLEEP APNEA): ICD-10-CM

## 2025-08-25 DIAGNOSIS — R59.0 LOCALIZED ENLARGED LYMPH NODES: Primary | ICD-10-CM

## 2025-08-25 DIAGNOSIS — Z98.890 S/P COMPLETE THYROIDECTOMY: ICD-10-CM

## 2025-08-25 DIAGNOSIS — Z90.89 S/P COMPLETE THYROIDECTOMY: ICD-10-CM

## 2025-08-25 PROCEDURE — 3008F BODY MASS INDEX DOCD: CPT | Mod: CPTII,S$GLB,, | Performed by: OTOLARYNGOLOGY

## 2025-08-25 PROCEDURE — 99214 OFFICE O/P EST MOD 30 MIN: CPT | Mod: S$GLB,,, | Performed by: OTOLARYNGOLOGY

## 2025-08-25 PROCEDURE — 3077F SYST BP >= 140 MM HG: CPT | Mod: CPTII,S$GLB,, | Performed by: OTOLARYNGOLOGY

## 2025-08-25 PROCEDURE — 3080F DIAST BP >= 90 MM HG: CPT | Mod: CPTII,S$GLB,, | Performed by: OTOLARYNGOLOGY

## 2025-08-25 PROCEDURE — 1159F MED LIST DOCD IN RCRD: CPT | Mod: CPTII,S$GLB,, | Performed by: OTOLARYNGOLOGY

## 2025-08-25 PROCEDURE — 3044F HG A1C LEVEL LT 7.0%: CPT | Mod: CPTII,S$GLB,, | Performed by: OTOLARYNGOLOGY

## 2025-08-29 ENCOUNTER — HOSPITAL ENCOUNTER (OUTPATIENT)
Dept: RADIOLOGY | Facility: HOSPITAL | Age: 30
Discharge: HOME | End: 2025-08-29
Attending: OTOLARYNGOLOGY
Payer: MEDICAID

## 2025-09-02 ENCOUNTER — TELEPHONE (OUTPATIENT)
Dept: PULMONOLOGY | Facility: CLINIC | Age: 30
End: 2025-09-02
Payer: MEDICAID

## (undated) DEVICE — SEE MEDLINE ITEM 154981

## (undated) DEVICE — ADHESIVE DERMABOND MINI HV

## (undated) DEVICE — BLADE SURG #15 CARBON STEEL

## (undated) DEVICE — HOOK LONE STAR BLUNT 12MM

## (undated) DEVICE — SHEARS HARMONIC CRVD 9 CM

## (undated) DEVICE — CORD FOR BIPOLAR FORCEPS 12

## (undated) DEVICE — ELECTRODE REM PLYHSV RETURN 9

## (undated) DEVICE — BLANKET LOWER BODY 55.9X40.2IN

## (undated) DEVICE — TOWEL OR DISP STRL BLUE 4/PK

## (undated) DEVICE — TRAY FOLEY 16FR INFECTION CONT

## (undated) DEVICE — SYS LABLNG CORECT MED 4 FLG

## (undated) DEVICE — APPLIER CLIP LIAGCLIP 9.375IN

## (undated) DEVICE — SEE MEDLINE ITEM 157110

## (undated) DEVICE — SYR 10CC LUER LOCK

## (undated) DEVICE — APPLIER LIGACLIP MED 11IN

## (undated) DEVICE — SPONGE LAP 18X18 PREWASHED

## (undated) DEVICE — GLOVE SURGICAL LATEX SZ 6.5

## (undated) DEVICE — SUT 3-0 12-18IN SILK

## (undated) DEVICE — EVACUATOR WOUND BULB 100CC

## (undated) DEVICE — SUPPORT ULNA NERVE PROTECTOR

## (undated) DEVICE — DRAPE STERI INSTRUMENT 1018

## (undated) DEVICE — STAPLER SKIN ROTATING HEAD

## (undated) DEVICE — SOL IRR SOD CHL .9% POUR

## (undated) DEVICE — SUT 4-0 CV RB-1 UND CR

## (undated) DEVICE — CONTAINER SPECIMEN STRL 4OZ

## (undated) DEVICE — SEE MEDLINE ITEM 157194

## (undated) DEVICE — COVER OVERHEAD SURG LT BLUE

## (undated) DEVICE — KIT SURGIFLO HEMOSTATIC MATRIX

## (undated) DEVICE — NDL HYPO REG 25G X 1 1/2

## (undated) DEVICE — SUT ETHILON 3-0 PS2 18 BLK

## (undated) DEVICE — Device

## (undated) DEVICE — SEALANT VISTASEAL FIBRIN 10ML

## (undated) DEVICE — CANISTER SUCTION 2 LTR

## (undated) DEVICE — SPONGE GAUZE 16PLY 4X4

## (undated) DEVICE — FORCEP SPTZLR-MALIS 1.5MM 8IN

## (undated) DEVICE — SUT VICRYL CTD 2-0 GI 27 SH

## (undated) DEVICE — SUT VICRYL PLUS 3-0 SH 18IN

## (undated) DEVICE — PACK HEAD & NECK

## (undated) DEVICE — SPONGE KITTNER 1/4X 5/8 L STRL

## (undated) DEVICE — APPLIER LIGACLIP SM 9.38IN

## (undated) DEVICE — POSITIONER HEAD DONUT 9IN FOAM

## (undated) DEVICE — DRAIN SIL FLAT 10MM FULL PERF

## (undated) DEVICE — DRAIN FLAT HUBLESS FULL 10MM